# Patient Record
Sex: MALE | Race: BLACK OR AFRICAN AMERICAN | NOT HISPANIC OR LATINO | Employment: FULL TIME | URBAN - METROPOLITAN AREA
[De-identification: names, ages, dates, MRNs, and addresses within clinical notes are randomized per-mention and may not be internally consistent; named-entity substitution may affect disease eponyms.]

---

## 2017-02-04 ENCOUNTER — TRANSFERRED RECORDS (OUTPATIENT)
Dept: HEALTH INFORMATION MANAGEMENT | Facility: CLINIC | Age: 52
End: 2017-02-04

## 2017-02-23 DIAGNOSIS — E78.5 HYPERLIPIDEMIA LDL GOAL <100: ICD-10-CM

## 2017-02-23 RX ORDER — PRAVASTATIN SODIUM 80 MG/1
TABLET ORAL
Qty: 90 TABLET | Refills: 3 | Status: CANCELLED | OUTPATIENT
Start: 2017-02-23

## 2017-02-23 NOTE — TELEPHONE ENCOUNTER
pravastatin (PRAVACHOL) 80 MG tablet     Last Written Prescription Date: 02/08/16  Last Fill Quantity: 90, # refills: 4  Last Office Visit with G, P or [facility] prescribing provider: 10/03/16       Lab Results   Component Value Date    CHOL 156 02/08/2016     Lab Results   Component Value Date    HDL 49 02/08/2016     Lab Results   Component Value Date    LDL 88 02/08/2016     08/06/2013     Lab Results   Component Value Date    TRIG 93 02/08/2016     Lab Results   Component Value Date    CHOLHDLRATIO 2.7 02/17/2015         [facility]

## 2017-02-27 ENCOUNTER — MYC MEDICAL ADVICE (OUTPATIENT)
Dept: FAMILY MEDICINE | Facility: CLINIC | Age: 52
End: 2017-02-27

## 2017-02-27 DIAGNOSIS — E78.5 HYPERLIPIDEMIA LDL GOAL <100: ICD-10-CM

## 2017-02-27 DIAGNOSIS — E11.9 TYPE 2 DIABETES MELLITUS WITHOUT COMPLICATION, WITHOUT LONG-TERM CURRENT USE OF INSULIN (H): Primary | ICD-10-CM

## 2017-02-27 RX ORDER — PRAVASTATIN SODIUM 80 MG/1
80 TABLET ORAL EVERY EVENING
Qty: 30 TABLET | Refills: 0 | Status: SHIPPED | OUTPATIENT
Start: 2017-02-27 | End: 2017-04-03

## 2017-02-27 NOTE — TELEPHONE ENCOUNTER
Pravastatin     Last Written Prescription Date: 02/08/16  Last Fill Quantity: 90, # refills: 4  [place] [facility] [place] [facility] or [facility] prescribing provider: 10/03/16       Lab Results   Component Value Date    CHOL 156 02/08/2016     Lab Results   Component Value Date    HDL 49 02/08/2016     Lab Results   Component Value Date    LDL 88 02/08/2016     08/06/2013     Lab Results   Component Value Date    TRIG 93 02/08/2016     Lab Results   Component Value Date    CHOLHDLRATIO 2.7 02/17/2015     Medication is being filled for 1 time refill only due to:  Patient needs labs FLP.    Samara Martin RN

## 2017-03-23 DIAGNOSIS — E78.5 HYPERLIPIDEMIA LDL GOAL <100: ICD-10-CM

## 2017-03-23 DIAGNOSIS — E11.9 TYPE 2 DIABETES MELLITUS WITHOUT COMPLICATION, WITHOUT LONG-TERM CURRENT USE OF INSULIN (H): ICD-10-CM

## 2017-03-23 LAB
ANION GAP SERPL CALCULATED.3IONS-SCNC: 6 MMOL/L (ref 3–14)
BUN SERPL-MCNC: 9 MG/DL (ref 7–30)
CALCIUM SERPL-MCNC: 9 MG/DL (ref 8.5–10.1)
CHLORIDE SERPL-SCNC: 104 MMOL/L (ref 94–109)
CHOLEST SERPL-MCNC: 185 MG/DL
CO2 SERPL-SCNC: 30 MMOL/L (ref 20–32)
CREAT SERPL-MCNC: 0.9 MG/DL (ref 0.66–1.25)
CREAT UR-MCNC: 233 MG/DL
GFR SERPL CREATININE-BSD FRML MDRD: 88 ML/MIN/1.7M2
GLUCOSE SERPL-MCNC: 122 MG/DL (ref 70–99)
HBA1C MFR BLD: 6 % (ref 4.3–6)
HDLC SERPL-MCNC: 51 MG/DL
LDLC SERPL CALC-MCNC: 113 MG/DL
MICROALBUMIN UR-MCNC: 8 MG/L
MICROALBUMIN/CREAT UR: 3.64 MG/G CR (ref 0–17)
NONHDLC SERPL-MCNC: 134 MG/DL
POTASSIUM SERPL-SCNC: 3.8 MMOL/L (ref 3.4–5.3)
SODIUM SERPL-SCNC: 140 MMOL/L (ref 133–144)
TRIGL SERPL-MCNC: 103 MG/DL

## 2017-03-23 PROCEDURE — 36415 COLL VENOUS BLD VENIPUNCTURE: CPT | Performed by: FAMILY MEDICINE

## 2017-03-23 PROCEDURE — 80048 BASIC METABOLIC PNL TOTAL CA: CPT | Performed by: FAMILY MEDICINE

## 2017-03-23 PROCEDURE — 82043 UR ALBUMIN QUANTITATIVE: CPT | Performed by: FAMILY MEDICINE

## 2017-03-23 PROCEDURE — 80061 LIPID PANEL: CPT | Performed by: FAMILY MEDICINE

## 2017-03-23 PROCEDURE — 83036 HEMOGLOBIN GLYCOSYLATED A1C: CPT | Performed by: FAMILY MEDICINE

## 2017-03-23 NOTE — PROGRESS NOTES
Noted. Has appointment with Dr. Norman on 4/3/17 for diabetes follow up at which time labs will be discussed.    Catalina Durant MD MPH  Covering for Dr. Norman

## 2017-03-27 NOTE — PROGRESS NOTES
Mr. Marte,    All of your labs were normal for you.    Please contact the clinic if you have additional questions.  Thank you.    Sincerely,    Mona Scales

## 2017-04-03 ENCOUNTER — OFFICE VISIT (OUTPATIENT)
Dept: FAMILY MEDICINE | Facility: CLINIC | Age: 52
End: 2017-04-03
Payer: COMMERCIAL

## 2017-04-03 VITALS
OXYGEN SATURATION: 98 % | TEMPERATURE: 97.4 F | DIASTOLIC BLOOD PRESSURE: 82 MMHG | HEART RATE: 71 BPM | SYSTOLIC BLOOD PRESSURE: 138 MMHG | HEIGHT: 73 IN | WEIGHT: 315 LBS | BODY MASS INDEX: 41.75 KG/M2

## 2017-04-03 DIAGNOSIS — I10 HYPERTENSION GOAL BP (BLOOD PRESSURE) < 140/90: ICD-10-CM

## 2017-04-03 DIAGNOSIS — E78.5 HYPERLIPIDEMIA LDL GOAL <100: ICD-10-CM

## 2017-04-03 DIAGNOSIS — E66.01 MORBID OBESITY DUE TO EXCESS CALORIES (H): ICD-10-CM

## 2017-04-03 DIAGNOSIS — E11.9 TYPE 2 DIABETES MELLITUS WITHOUT COMPLICATION, WITHOUT LONG-TERM CURRENT USE OF INSULIN (H): Primary | ICD-10-CM

## 2017-04-03 DIAGNOSIS — J45.20 INTERMITTENT ASTHMA, UNCOMPLICATED: ICD-10-CM

## 2017-04-03 PROCEDURE — 99214 OFFICE O/P EST MOD 30 MIN: CPT | Performed by: FAMILY MEDICINE

## 2017-04-03 PROCEDURE — 99207 C FOOT EXAM  NO CHARGE: CPT | Performed by: FAMILY MEDICINE

## 2017-04-03 RX ORDER — PRAVASTATIN SODIUM 80 MG/1
80 TABLET ORAL EVERY EVENING
Qty: 90 TABLET | Refills: 3 | Status: SHIPPED | OUTPATIENT
Start: 2017-04-03 | End: 2017-12-18

## 2017-04-03 RX ORDER — AMLODIPINE BESYLATE 10 MG/1
10 TABLET ORAL DAILY
Qty: 90 TABLET | Refills: 1 | Status: SHIPPED | OUTPATIENT
Start: 2017-04-03 | End: 2017-09-25

## 2017-04-03 RX ORDER — ALBUTEROL SULFATE 90 UG/1
2 AEROSOL, METERED RESPIRATORY (INHALATION) EVERY 4 HOURS PRN
Qty: 1 INHALER | Refills: 2 | Status: SHIPPED | OUTPATIENT
Start: 2017-04-03 | End: 2018-03-07

## 2017-04-03 NOTE — MR AVS SNAPSHOT
After Visit Summary   4/3/2017    Geovany Marte    MRN: [id number]           Patient Information     Date Of Birth          1965        Visit Information        Provider Department      4/3/2017 5:00 PM Mona Lin MD [facility]        Today's Diagnoses     Type 2 diabetes mellitus without complication, without long-term current use of insulin (H)    -  1    Hyperlipidemia LDL goal <100        Morbid obesity due to excess calories (H)        Hypertension goal BP (blood pressure) < 140/90        Intermittent asthma, uncomplicated          Care Instructions    Based on your medical history and these are the current health maintenance or preventive care services that you are due for (some may have been done at this visit)  Health Maintenance Due   Topic Date Due     ASTHMA CONTROL TEST Q6 MOS (NO INBASKET)  03/14/2016     FOOT EXAM Q1 YEAR( NO INBASKET)  09/14/2016     ASTHMA ACTION PLAN Q1 YR (NO INBASKET)  02/08/2017         At [facility], we strive to deliver an exceptional experience to you, every time we see you.    If you receive a survey in the mail, please send us back your thoughts. We really do value your feedback.    Your care team's suggested websites for health information:  Www.[employer]av[employer]oom.org : Up to date and easily searchable information on multiple topics.  Www.medlineplus.gov : medication info, interactive tutorials, watch real surgeries online  Www.familydoctor.org : good info from the Academy of Family Physicians  Www.cdc.gov : public health info, travel advisories, epidemics (H1N1)  Www.aap.org : children's health info, normal development, vaccinations  Www.health.[facility].[place].us : [place] dept of health, public health issues in [place], N1N1    How to contact your care team:   Team [employer] [phone]         Pharmacy [phone]    Dr. Norman, Loli Solorzano PA-C, Mouna Martin APRN CNP, Cyndi Curry,  GABBIE, Dr. Elias, and GEOVANNI Lomax CNP    Team RNs: Perla & Beatriz      Clinic hours  M-Th 7 am-7 pm   Fri 7 am-5 pm.   Urgent care M-F 11 am-9 pm,   Sat/Sun 9 am-5 pm.  Pharmacy M-Th 8 am-8 pm Fri 8 am-6 pm  Sat/Sun 9 am-5 pm.     All password changes, disabled accounts, or ID changes in [employer]/MyHealth will be done by our Access Services Department.    If you need help with your account or password, call: [phone]. Clinic staff no longer has the ability to change passwords.           Follow-ups after your visit        Who to contact     If you have questions or need follow up information about today's clinic visit or your schedule please contact [facility] directly at [phone].  Normal or non-critical lab and imaging results will be communicated to you by [employer], letter or phone within 4 business days after the clinic has received the results. If you do not hear from us within 7 days, please contact the clinic through [employer] or phone. If you have a critical or abnormal lab result, we will notify you by phone as soon as possible.  Submit refill requests through [employer] or call your pharmacy and they will forward the refill request to us. Please allow 3 business days for your refill to be completed.          Additional Information About Your Visit        GreenPal Information     [employer] gives you secure access to your electronic health record. If you see a primary care provider, you can also send messages to your care team and make appointments. If you have questions, please call your primary care clinic.  If you do not have a primary care provider, please call [phone] and they will assist you.        Care EveryWhere ID     This is your Care EveryWhere ID. This could be used by other organizations to access your [facility] medical records  [id number]        Your Vitals Were     Pulse Temperature Height Pulse Oximetry BMI (Body Mass Index)       71 97.4  F (36.3  C) (Oral)  "6' 1\" (1.854 m) 98% 43.93 kg/m2        Blood Pressure from Last 3 Encounters:   04/03/17 138/82   10/03/16 136/84   02/08/16 136/82    Weight from Last 3 Encounters:   04/03/17 (!) 333 lb (151 kg)   10/03/16 (!) 317 lb 3.2 oz (143.9 kg)   02/08/16 (!) 307 lb 9.6 oz (139.5 kg)              We Performed the Following     Asthma Action Plan (AAP)     FOOT EXAM  NO CHARGE [76939.114]          Today's Medication Changes          These changes are accurate as of: 4/3/17  5:32 PM.  If you have any questions, ask your nurse or doctor.               These medicines have changed or have updated prescriptions.        Dose/Directions    amLODIPine 10 MG tablet   Commonly known as:  NORVASC   This may have changed:  See the new instructions.   Used for:  Hypertension goal BP (blood pressure) < 140/90   Changed by:  Mona Lin MD        Dose:  10 mg   Take 1 tablet (10 mg) by mouth daily   Quantity:  90 tablet   Refills:  1            Where to get your medicines      These medications were sent to [facility]/pharmacy #[id number] - [place], [place] - [address]  [address] [address]     Phone:  [phone]     albuterol 108 (90 BASE) MCG/ACT Inhaler    amLODIPine 10 MG tablet    pravastatin 80 MG tablet                Primary Care Provider Office Phone # Fax #    Mona Scales [phone]       [facility] [address]  Health system [address]        Thank you!     Thank you for choosing [facility]  for your care. Our goal is always to provide you with excellent care. Hearing back from our patients is one way we can continue to improve our services. Please take a few minutes to complete the written survey that you may receive in the mail after your visit with us. Thank you!             Your Updated Medication List - Protect others around you: Learn how to safely use, store and throw away your medicines at " www.disposemymeds.org.          This list is accurate as of: 4/3/17  5:32 PM.  Always use your most recent med list.                   Brand Name Dispense Instructions for use    ACE/ARB NOT PRESCRIBED (INTENTIONAL)      continuous prn for other Reported on 4/3/2017       albuterol 108 (90 BASE) MCG/ACT Inhaler    albuterol    1 Inhaler    Inhale 2 puffs into the lungs every 4 hours as needed (for asthma symptoms)       amLODIPine 10 MG tablet    NORVASC    90 tablet    Take 1 tablet (10 mg) by mouth daily       aspirin 81 MG tablet      1 TABLET DAILY (*)       blood glucose monitoring test strip    ONE TOUCH ULTRA    1 Box    by In Vitro route 2 times daily.       carvedilol 25 MG tablet    COREG    180 tablet    TAKE 1 TABLET BY MOUTH 2 TIMES DAILY (WITH MEALS)       CITRACAL CALCIUM+D PO      3 times daily. Take one tablet twice daily.       * cyanocobalamin 1000 MCG/ML injection    VITAMIN B12    1 mL    Inject 1 mL (1,000 mcg) Subcutaneous every 30 days       * cyanocobalamin 1000 MCG/ML injection    VITAMIN B12    1 mL    Inject 1 mL (1,000 mcg) Subcutaneous every 30 days       fluticasone 50 MCG/ACT spray    FLONASE    1 Package    Spray 1-2 sprays into both nostrils daily       order for DME     1 kit    One touch glucometer with supplies to replace old meter for testing twice daily       order for DME     12 each    Injection Supplies for Vitamin B12: 3cc syringes w/ 27 gauge needles, 1/2 inch length       pravastatin 80 MG tablet    PRAVACHOL    90 tablet    Take 1 tablet (80 mg) by mouth every evening       * Notice:  This list has 2 medication(s) that are the same as other medications prescribed for you. Read the directions carefully, and ask your doctor or other care provider to review them with you.

## 2017-04-03 NOTE — NURSING NOTE
"Chief Complaint   Patient presents with     Diabetes     Follow up.     Hypertension     Follow up.     Hyperlipidemia     Follow up.       Initial /82 (BP Location: Right arm, Patient Position: Chair, Cuff Size: Adult Large)  Pulse 71  Temp 97.4  F (36.3  C) (Oral)  Ht 6' 1\" (1.854 m)  Wt (!) 333 lb (151 kg)  SpO2 98%  BMI 43.93 kg/m2 Estimated body mass index is 43.93 kg/(m^2) as calculated from the following:    Height as of this encounter: 6' 1\" (1.854 m).    Weight as of this encounter: 333 lb (151 kg).  Medication Reconciliation: complete   An,CMA      "

## 2017-04-03 NOTE — PATIENT INSTRUCTIONS
Based on your medical history and these are the current health maintenance or preventive care services that you are due for (some may have been done at this visit)  Health Maintenance Due   Topic Date Due     ASTHMA CONTROL TEST Q6 MOS (NO INBASKET)  03/14/2016     FOOT EXAM Q1 YEAR( NO INBASKET)  09/14/2016     ASTHMA ACTION PLAN Q1 YR (NO INBASKET)  02/08/2017         At [facility], we strive to deliver an exceptional experience to you, every time we see you.    If you receive a survey in the mail, please send us back your thoughts. We really do value your feedback.    Your care team's suggested websites for health information:  Www.[facility].org : Up to date and easily searchable information on multiple topics.  Www.medlineplus.gov : medication info, interactive tutorials, watch real surgeries online  Www.familydoctor.org : good info from the Academy of Family Physicians  Www.cdc.gov : public health info, travel advisories, epidemics (H1N1)  Www.aap.org : children's health info, normal development, vaccinations  Www.health.[facility].[place].us : [place] dept of health, public health issues in [place], N1N1    How to contact your care team:   Team Ofe[employer]irit [phone]         Pharmacy [phone]    Dr. Norman, Loli Solorzano PA-C, Dr. Durant, Mouna GALARZA CNP, Cyndi Curry PA-C, Dr. Elias, and GEOVANNI Lomax CNP    Team RNs: Perla Henderson      Clinic hours  M-Th 7 am-7 pm   Fri 7 am-5 pm.   Urgent care M-F 11 am-9 pm,   Sat/Sun 9 am-5 pm.  Pharmacy M-Th 8 am-8 pm Fri 8 am-6 pm  Sat/Sun 9 am-5 pm.     All password changes, disabled accounts, or ID changes in [employer]/MyHealth will be done by our Access Services Department.    If you need help with your account or password, call: [phone]. Clinic staff no longer has the ability to change passwords.

## 2017-04-03 NOTE — PROGRESS NOTES
SUBJECTIVE:                                                    Geovany Marte is a 51 year old male who presents to clinic today for the following health issues:        Diabetes/obesity Follow-up    Patient is checking blood sugars: once daily.  Results are as follows:         am - 90 to 115    Diabetic concerns: None     Symptoms of hypoglycemia (low blood sugar): none     Paresthesias (numbness or burning in feet) or sores: No     Date of last diabetic eye exam: duo     Hyperlipidemia Follow-Up      Rate your low fat/cholesterol diet?: fair    Taking statin?  Yes, no muscle aches from statin    Other lipid medications/supplements?:  none     Hypertension Follow-up      Outpatient blood pressures are being checked at home.  Results are 140/90.    Low Salt Diet: low salt         Amount of exercise or physical activity: None    Problems taking medications regularly: No    Medication side effects: none    Diet: regular (no restrictions)    Currently on severance from [employer] and may return until July.  Looking into getting into pharma sells    Asthma Follow-Up    Was ACT completed today?  Yes 25 today  Yes    ACT Total Scores 9/14/2015   ACT TOTAL SCORE -   ASTHMA ER VISITS -   ASTHMA HOSPITALIZATIONS -   ACT TOTAL SCORE (Goal Greater than or Equal to 20) 25   In the past 12 months, how many times did you visit the emergency room for your asthma without being admitted to the hospital? 0   In the past 12 months, how many times were you hospitalized overnight because of your asthma? 0       Recent asthma triggers that patient is dealing with: None        Problem list and histories reviewed & adjusted, as indicated.  Additional history: as documented    Patient Active Problem List   Diagnosis     Hyperlipidemia LDL goal <100     Type 2 diabetes, HbA1c goal < 7% (H)     Disturbance of skin sensation     Intermittent asthma     Hypertension goal BP (blood pressure) < 140/90     Dry eye syndrome     Nuclear sclerosis      "Erectile dysfunction     Glaucoma suspect     Gout     Seasonal allergic rhinitis     Morbid obesity (H)     Past Surgical History:   Procedure Laterality Date     ENT SURGERY      tonsils removed at 19 years old     ESOPHAGOSCOPY, GASTROSCOPY, DUODENOSCOPY (EGD), COMBINED N/A 3/18/2015    Procedure: COMBINED ESOPHAGOSCOPY, GASTROSCOPY, DUODENOSCOPY (EGD);  Surgeon: Jae Robles MD;  Location:  GI     LAPAROSCOPIC BYPASS GASTRIC  6/22/2011    Procedure:LAPAROSCOPIC BYPASS GASTRIC; Surgeon:HANNAH SHEPARD; Location: OR       Social History   Substance Use Topics     Smoking status: Never Smoker     Smokeless tobacco: Never Used      Comment: 1994     Alcohol use 0.0 oz/week     0 Standard drinks or equivalent per week      Comment: Once a week.     Family History   Problem Relation Age of Onset     CEREBROVASCULAR DISEASE Mother      Hypertension Mother      DIABETES Father      Hypertension Father      HEART DISEASE Brother      Breast Cancer Sister      CANCER Sister      CEREBROVASCULAR DISEASE Brother      Breast Cancer Sister      Asthma Son      Thyroid Disease No family hx of      Glaucoma No family hx of      Macular Degeneration No family hx of            Reviewed and updated as needed this visit by clinical staff       Reviewed and updated as needed this visit by Provider         ROS:  Constitutional, HEENT, cardiovascular, pulmonary, gi and gu systems are negative, except as otherwise noted.    OBJECTIVE:                                                    /82 (BP Location: Right arm, Patient Position: Chair, Cuff Size: Adult Large)  Pulse 71  Temp 97.4  F (36.3  C) (Oral)  Ht 6' 1\" (1.854 m)  Wt (!) 333 lb (151 kg)  SpO2 98%  BMI 43.93 kg/m2  Body mass index is 43.93 kg/(m^2).  GENERAL: healthy, alert and no distress  NECK: no adenopathy, no asymmetry, masses, or scars and thyroid normal to palpation  RESP: lungs clear to auscultation - no rales, rhonchi or wheezes  CV: regular " rate and rhythm, normal S1 S2, no S3 or S4, no murmur, click or rub, no peripheral edema and peripheral pulses strong  ABDOMEN: soft, nontender, no hepatosplenomegaly, no masses and bowel sounds normal  MS: no gross musculoskeletal defects noted, no edema  PSYCH: mentation appears normal, affect normal/bright  Diabetic foot exam: normal DP and PT pulses, no trophic changes or ulcerative lesions, normal sensory exam and normal monofilament exam    Diagnostic Test Results:  Results for orders placed or performed in visit on 03/23/17   Basic metabolic panel   Result Value Ref Range    Sodium 140 133 - 144 mmol/L    Potassium 3.8 3.4 - 5.3 mmol/L    Chloride 104 94 - 109 mmol/L    Carbon Dioxide 30 20 - 32 mmol/L    Anion Gap 6 3 - 14 mmol/L    Glucose 122 (H) 70 - 99 mg/dL    Urea Nitrogen 9 7 - 30 mg/dL    Creatinine 0.90 0.66 - 1.25 mg/dL    GFR Estimate 88 >60 mL/min/1.7m2    GFR Estimate If Black >90   GFR Calc   >60 mL/min/1.7m2    Calcium 9.0 8.5 - 10.1 mg/dL   Hemoglobin A1c   Result Value Ref Range    Hemoglobin A1C 6.0 4.3 - 6.0 %   Lipid panel reflex to direct LDL   Result Value Ref Range    Cholesterol 185 <200 mg/dL    Triglycerides 103 <150 mg/dL    HDL Cholesterol 51 >39 mg/dL    LDL Cholesterol Calculated 113 (H) <100 mg/dL    Non HDL Cholesterol 134 (H) <130 mg/dL   Albumin Random Urine Quantitative   Result Value Ref Range    Creatinine Urine 233 mg/dL    Albumin Urine mg/L 8 mg/L    Albumin Urine mg/g Cr 3.64 0 - 17 mg/g Cr        ASSESSMENT/PLAN:                                                    1. Type 2 diabetes mellitus without complication, without long-term current use of insulin (H)  Controlled - continue current treatment, increase exercise and decrease carbs  - FOOT EXAM  NO CHARGE [60997.114]    2. Hyperlipidemia LDL goal <100  Refilled  - pravastatin (PRAVACHOL) 80 MG tablet; Take 1 tablet (80 mg) by mouth every evening  Dispense: 90 tablet; Refill: 3    3. Hypertension  goal BP (blood pressure) < 140/90  Controlled - Refilled  - amLODIPine (NORVASC) 10 MG tablet; Take 1 tablet (10 mg) by mouth daily  Dispense: 90 tablet; Refill: 1    4. Intermittent asthma, uncomplicated  Stable - Refilled.  - albuterol (ALBUTEROL) 108 (90 BASE) MCG/ACT Inhaler; Inhale 2 puffs into the lungs every 4 hours as needed (for asthma symptoms)  Dispense: 1 Inhaler; Refill: 2  - Asthma Action Plan (AAP)    Morbid obesity  Low carb eating and exercise recommended.    The uses and side effects, including black box warnings as appropriate, were discussed in detail.  All patient questions were answered.  The patient was instructed to call immediately if any side effects developed.     FUTURE APPOINTMENTS:       - Follow-up visit in 6 months.    Mona Scales MD  [facility]

## 2017-04-04 ASSESSMENT — ASTHMA QUESTIONNAIRES: ACT_TOTALSCORE: 25

## 2017-05-19 ENCOUNTER — MYC MEDICAL ADVICE (OUTPATIENT)
Dept: FAMILY MEDICINE | Facility: CLINIC | Age: 52
End: 2017-05-19

## 2017-05-19 DIAGNOSIS — E78.5 HYPERLIPIDEMIA LDL GOAL <100: ICD-10-CM

## 2017-05-19 DIAGNOSIS — I10 HYPERTENSION GOAL BP (BLOOD PRESSURE) < 140/90: ICD-10-CM

## 2017-05-19 NOTE — TELEPHONE ENCOUNTER
OTHER TWO MEDS SHOULD HAVE REFILLS AVAILABLE.      carvedilol (COREG) 25 MG tablet      Last Written Prescription Date: 12/06/16  Last Fill Quantity: 180, # refills: 1    Last Office Visit with [facility], P or [facility] prescribing provider:  04/03/17   Future Office Visit:        BP Readings from Last 3 Encounters:   04/03/17 138/82   10/03/16 136/84   02/08/16 136/82

## 2017-05-21 DIAGNOSIS — I10 HYPERTENSION GOAL BP (BLOOD PRESSURE) < 140/90: ICD-10-CM

## 2017-05-21 NOTE — TELEPHONE ENCOUNTER
carvedilol (COREG) 25 MG tablet      Last Written Prescription Date: 12/6/16  Last Fill Quantity: 180, # refills: 1    Last Office Visit with G, P or [facility] prescribing provider:  4/3/17   Future Office Visit:        BP Readings from Last 3 Encounters:   04/03/17 138/82   10/03/16 136/84   02/08/16 136/82       [facility]

## 2017-05-23 RX ORDER — AMLODIPINE BESYLATE 10 MG/1
10 TABLET ORAL DAILY
Qty: 90 TABLET | Refills: 1 | OUTPATIENT
Start: 2017-05-23

## 2017-05-23 RX ORDER — PRAVASTATIN SODIUM 80 MG/1
80 TABLET ORAL EVERY EVENING
Qty: 90 TABLET | Refills: 3 | OUTPATIENT
Start: 2017-05-23

## 2017-05-23 RX ORDER — CARVEDILOL 25 MG/1
TABLET ORAL
Qty: 180 TABLET | Refills: 2 | Status: SHIPPED | OUTPATIENT
Start: 2017-05-23 | End: 2018-02-11

## 2017-05-23 RX ORDER — CARVEDILOL 25 MG/1
TABLET ORAL
Qty: 180 TABLET | Refills: 2 | Status: SHIPPED | OUTPATIENT
Start: 2017-05-23 | End: 2017-05-23

## 2017-05-23 NOTE — TELEPHONE ENCOUNTER
Prescription approved per [facility]. - Coreg although I cannot get a successful transmission to pharmacy, I've tried twice.  Denied amlodipine and pravastatin as there should be refills available for patient at pharmacy.     I left detailed voice mail message for pharmacist verbally ordering coreg because I couldn't get it to go through eprescribe.     Zeinab Lockwood RN

## 2017-05-24 ENCOUNTER — TELEPHONE (OUTPATIENT)
Dept: FAMILY MEDICINE | Facility: CLINIC | Age: 52
End: 2017-05-24

## 2017-05-24 RX ORDER — CARVEDILOL 25 MG/1
TABLET ORAL
Qty: 180 TABLET | Refills: 1 | OUTPATIENT
Start: 2017-05-24

## 2017-05-24 NOTE — TELEPHONE ENCOUNTER
Pharmacy never received the verbal Rx left on their line. Called in verbal directly to the tech.    Beatriz Frias RN, [facility]

## 2017-05-24 NOTE — TELEPHONE ENCOUNTER
Reason for Call:  Medication or medication refill:    Do you use a [facility]?  Name of the pharmacy and phone number for the current request:     [facility] [phone]         Name of the medication requested: carvedilol (COREG) 25 MG tablet    Other request: Patient had lost his luggage that his medication was in,  and now does not have any pills left for the above medication    Can we leave a detailed message on this number? YES    Phone number patient can be reached at:     Best Time: as soon as possible    Sent High priority due to no medication due to loss     Call taken on 5/24/2017 at 2:33 PM by Radha Krause

## 2017-09-25 ENCOUNTER — OFFICE VISIT (OUTPATIENT)
Dept: FAMILY MEDICINE | Facility: CLINIC | Age: 52
End: 2017-09-25
Payer: COMMERCIAL

## 2017-09-25 VITALS
SYSTOLIC BLOOD PRESSURE: 134 MMHG | DIASTOLIC BLOOD PRESSURE: 70 MMHG | WEIGHT: 315 LBS | BODY MASS INDEX: 41.75 KG/M2 | HEART RATE: 62 BPM | OXYGEN SATURATION: 97 % | TEMPERATURE: 98.1 F | HEIGHT: 73 IN

## 2017-09-25 DIAGNOSIS — E66.01 MORBID OBESITY DUE TO EXCESS CALORIES (H): ICD-10-CM

## 2017-09-25 DIAGNOSIS — M62.838 MUSCLE SPASM: ICD-10-CM

## 2017-09-25 DIAGNOSIS — E11.9 TYPE 2 DIABETES MELLITUS WITHOUT COMPLICATION, WITHOUT LONG-TERM CURRENT USE OF INSULIN (H): Primary | ICD-10-CM

## 2017-09-25 DIAGNOSIS — I10 HYPERTENSION GOAL BP (BLOOD PRESSURE) < 140/90: ICD-10-CM

## 2017-09-25 DIAGNOSIS — Z23 NEED FOR PROPHYLACTIC VACCINATION AND INOCULATION AGAINST INFLUENZA: ICD-10-CM

## 2017-09-25 LAB — HBA1C MFR BLD: 6.2 % (ref 4.3–6)

## 2017-09-25 PROCEDURE — 80048 BASIC METABOLIC PNL TOTAL CA: CPT | Performed by: FAMILY MEDICINE

## 2017-09-25 PROCEDURE — 99214 OFFICE O/P EST MOD 30 MIN: CPT | Mod: 25 | Performed by: FAMILY MEDICINE

## 2017-09-25 PROCEDURE — 90686 IIV4 VACC NO PRSV 0.5 ML IM: CPT | Performed by: FAMILY MEDICINE

## 2017-09-25 PROCEDURE — 90471 IMMUNIZATION ADMIN: CPT | Performed by: FAMILY MEDICINE

## 2017-09-25 PROCEDURE — 36415 COLL VENOUS BLD VENIPUNCTURE: CPT | Performed by: FAMILY MEDICINE

## 2017-09-25 PROCEDURE — 83036 HEMOGLOBIN GLYCOSYLATED A1C: CPT | Performed by: FAMILY MEDICINE

## 2017-09-25 RX ORDER — AMLODIPINE BESYLATE 10 MG/1
10 TABLET ORAL DAILY
Qty: 90 TABLET | Refills: 1 | Status: SHIPPED | OUTPATIENT
Start: 2017-09-25 | End: 2018-03-07

## 2017-09-25 NOTE — MR AVS SNAPSHOT
After Visit Summary   9/25/2017    Geovany Marte    MRN: [id number]           Patient Information     Date Of Birth          1965        Visit Information        Provider Department      9/25/2017 5:40 PM Mona Lin MD [facility]        Today's Diagnoses     Type 2 diabetes mellitus without complication, without long-term current use of insulin (H)    -  1    Muscle spasm        Hypertension goal BP (blood pressure) < 140/90        Morbid obesity due to excess calories (H)        Need for prophylactic vaccination and inoculation against influenza           Follow-ups after your visit        Your next 10 appointments already scheduled     Sep 26, 2017  4:00 PM CDT   New Visit with Yoseph Ortiz OD   [facility] ([facility])    [address] [place] [id number] [phone]              Who to contact     If you have questions or need follow up information about today's clinic visit or your schedule please contact [facility] directly at [phone].  Normal or non-critical lab and imaging results will be communicated to you by MyChart, letter or phone within 4 business days after the clinic has received the results. If you do not hear from us within 7 days, please contact the clinic through Imaginovahart or phone. If you have a critical or abnormal lab result, we will notify you by phone as soon as possible.  Submit refill requests through Photofy or call your pharmacy and they will forward the refill request to us. Please allow 3 business days for your refill to be completed.          Additional Information About Your Visit        MyChart Information     Photofy gives you secure access to your electronic health record. If you see a primary care provider, you can also send messages to your care team and make appointments. If you have questions, please call your primary care clinic.  If  "you do not have a primary care provider, please call [phone] and they will assist you.        Care EveryWhere ID     This is your Care EveryWhere ID. This could be used by other organizations to access your [facility] medical records  [id number]        Your Vitals Were     Pulse Temperature Height Pulse Oximetry BMI (Body Mass Index)       62 98.1  F (36.7  C) (Oral) 6' 1\" (1.854 m) 97% 44.07 kg/m2        Blood Pressure from Last 3 Encounters:   09/25/17 134/70   04/03/17 138/82   10/03/16 136/84    Weight from Last 3 Encounters:   09/25/17 (!) 334 lb (151.5 kg)   04/03/17 (!) 333 lb (151 kg)   10/03/16 (!) 317 lb 3.2 oz (143.9 kg)              We Performed the Following          ADMIN VACCINE, FIRST [49969]     Basic metabolic panel     HC FLU VAC PRESRV FREE QUAD SPLIT VIR 3+YRS IM  [45146]     HEMOGLOBIN A1C     Vaccine Administration, Initial [10900]          Where to get your medicines      These medications were sent to [facility]/pharmacy #[id number] - [facility], [place] - [address]  [address], [facility] [address]     Phone:  [phone]     amLODIPine 10 MG tablet          Primary Care Provider Office Phone # Fax #    Mona Hodgesstephanie Scales [phone]       [address]  [facility] [address]        Equal Access to Services     [facility] CenterJESSICA AH: Hadii aad ku hadasho Soomaali, waaxda luqadaha, qaybta kaalmada adeegyada, omega pressley. So [facility] [phone].    ATENCIÓN: Si habla español, tiene a cortes disposición servicios gratuitos de asistencia lingüística. Llame al [phone].    We comply with applicable federal civil rights laws and [place] laws. We do not discriminate on the basis of race, color, national origin, age, disability sex, sexual orientation or gender identity.            Thank you!     Thank you for choosing [facility]  for your care. Our goal is always to provide you with excellent care. Hearing " back from our patients is one way we can continue to improve our services. Please take a few minutes to complete the written survey that you may receive in the mail after your visit with us. Thank you!             Your Updated Medication List - Protect others around you: Learn how to safely use, store and throw away your medicines at www.disposemymeds.org.          This list is accurate as of: 9/25/17  7:09 PM.  Always use your most recent med list.                   Brand Name Dispense Instructions for use Diagnosis    ACE/ARB NOT PRESCRIBED (INTENTIONAL)      continuous prn for other Reported on 4/3/2017    Type 2 diabetes mellitus without complication (H)       albuterol 108 (90 BASE) MCG/ACT Inhaler    PROAIR HFA    1 Inhaler    Inhale 2 puffs into the lungs every 4 hours as needed (for asthma symptoms)    Intermittent asthma, uncomplicated       amLODIPine 10 MG tablet    NORVASC    90 tablet    Take 1 tablet (10 mg) by mouth daily    Hypertension goal BP (blood pressure) < 140/90       aspirin 81 MG tablet      1 TABLET DAILY (*)        blood glucose monitoring test strip    ONE TOUCH ULTRA    1 Box    by In Vitro route 2 times daily.    Type 2 diabetes, HbA1c goal < 7% (H)       carvedilol 25 MG tablet    COREG    180 tablet    TAKE 1 TABLET BY MOUTH 2 TIMES DAILY (WITH MEALS)    Hypertension goal BP (blood pressure) < 140/90       CITRACAL CALCIUM+D PO      3 times daily. Take one tablet twice daily.        cyanocobalamin 1000 MCG/ML injection    VITAMIN B12    1 mL    Inject 1 mL (1,000 mcg) Subcutaneous every 30 days    Achlorhydria, gastric       fluticasone 50 MCG/ACT spray    FLONASE    1 Package    Spray 1-2 sprays into both nostrils daily    Seasonal allergic rhinitis       order for DME     1 kit    One touch glucometer with supplies to replace old meter for testing twice daily    Type 2 diabetes, HbA1c goal < 7% (H)       order for DME     12 each    Injection Supplies for Vitamin B12: 3cc syringes  w/ 27 gauge needles, 1/2 inch length    Achlorhydria, gastric       pravastatin 80 MG tablet    PRAVACHOL    90 tablet    Take 1 tablet (80 mg) by mouth every evening    Hyperlipidemia LDL goal <100

## 2017-09-25 NOTE — PROGRESS NOTES
Injectable Influenza Immunization Documentation    1.  Is the person to be vaccinated sick today?   No    2. Does the person to be vaccinated have an allergy to a component   of the vaccine?   No    3. Has the person to be vaccinated ever had a serious reaction   to influenza vaccine in the past?   No    4. Has the person to be vaccinated ever had Guillain-Barré syndrome?   No    Form completed by Anai Crespo MA

## 2017-09-25 NOTE — PROGRESS NOTES
SUBJECTIVE:   Geovany Marte is a 51 year old male who presents to clinic today for the following health issues:      Diabetes Follow-up    Patient is checking blood sugars: twice daily.    Blood sugar testing frequency justification:   Results are as follows:       Lunchtime and Breakfast - 10AM or 4PM    Diabetic concerns: None     Symptoms of hypoglycemia (low blood sugar): none     Paresthesias (numbness or burning in feet) or sores: No   Date of last diabetic eye exam: not yet    Hypertension Follow-up      Outpatient blood pressures are being checked at home.  Results are High.    Low Salt Diet: no    Amount of exercise or physical activity: None    Problems taking medications regularly: No    Medication side effects: none    Diet: regular (no restrictions)      Muscle spasm of right calf after knee injury 1 month ago.  Banged knee when getting up from [employer] and knee has improved but sometimes getting cramping of calf at night.  Not eating many green veggies.    Problem list and histories reviewed & adjusted, as indicated.  Additional history: as documented    Patient Active Problem List   Diagnosis     Hyperlipidemia LDL goal <100     Type 2 diabetes, HbA1c goal < 7% (H)     Disturbance of skin sensation     Intermittent asthma     Hypertension goal BP (blood pressure) < 140/90     Dry eye syndrome     Nuclear sclerosis     Erectile dysfunction     Glaucoma suspect     Gout     Seasonal allergic rhinitis     Morbid obesity (H)     Past Surgical History:   Procedure Laterality Date     ENT SURGERY      tonsils removed at 19 years old     ESOPHAGOSCOPY, GASTROSCOPY, DUODENOSCOPY (EGD), COMBINED N/A 3/18/2015    Procedure: COMBINED ESOPHAGOSCOPY, GASTROSCOPY, DUODENOSCOPY (EGD);  Surgeon: Jae Robles MD;  Location:  GI     LAPAROSCOPIC BYPASS GASTRIC  6/22/2011    Procedure:LAPAROSCOPIC BYPASS GASTRIC; Surgeon:HANNAH SHEPARD; Location: OR       Social History   Substance Use Topics      "Smoking status: Never Smoker     Smokeless tobacco: Never Used      Comment: 1994     Alcohol use 0.0 oz/week     0 Standard drinks or equivalent per week      Comment: Once a week.     Family History   Problem Relation Age of Onset     CEREBROVASCULAR DISEASE Mother      Hypertension Mother      DIABETES Father      Hypertension Father      HEART DISEASE Brother      Breast Cancer Sister      CANCER Sister      CEREBROVASCULAR DISEASE Brother      Breast Cancer Sister      Asthma Son      Thyroid Disease No family hx of      Glaucoma No family hx of      Macular Degeneration No family hx of              Reviewed and updated as needed this visit by clinical staff     Reviewed and updated as needed this visit by Provider         ROS:  Constitutional, HEENT, cardiovascular, pulmonary, gi and gu systems are negative, except as otherwise noted.      OBJECTIVE:   /70  Pulse 62  Temp 98.1  F (36.7  C) (Oral)  Ht 6' 1\" (1.854 m)  Wt (!) 334 lb (151.5 kg)  SpO2 97%  BMI 44.07 kg/m2  Body mass index is 44.07 kg/(m^2).  GENERAL: healthy, alert, no distress and obese  NECK: no adenopathy, no asymmetry, masses, or scars and thyroid normal to palpation  RESP: lungs clear to auscultation - no rales, rhonchi or wheezes  CV: regular rate and rhythm, normal S1 S2, no S3 or S4, no murmur, click or rub, no peripheral edema and peripheral pulses strong  ABDOMEN: soft, nontender, no hepatosplenomegaly, no masses and bowel sounds normal  MS: no gross musculoskeletal defects noted, no edema  NEURO: Normal strength and tone, mentation intact and speech normal  PSYCH: mentation appears normal, affect normal/bright    Diagnostic Test Results:  none     ASSESSMENT/PLAN:     1. Type 2 diabetes mellitus without complication, without long-term current use of insulin (H)  Previously controlled - check lab  - Basic metabolic panel  - HEMOGLOBIN A1C    2. Muscle spasm  Recommend otc magnesium supplement, increase green veggies and " mychart if not improved in 1 week.    4. Hypertension goal BP (blood pressure) < 140/90  Controlled - continue current treatment  - amLODIPine (NORVASC) 10 MG tablet; Take 1 tablet (10 mg) by mouth daily  Dispense: 90 tablet; Refill: 1    5. Morbid obesity due to excess calories (H)  Low carb eating recommended    6. Need for prophylactic vaccination and inoculation against influenza    - HC FLU VAC PRESRV FREE QUAD SPLIT VIR 3+YRS IM  [81238]  -      ADMIN VACCINE, FIRST [06973]    The uses and side effects, including black box warnings as appropriate, were discussed in detail.  All patient questions were answered.  The patient was instructed to call immediately if any side effects developed.     FUTURE APPOINTMENTS:       - Follow-up visit in 6 months    Mona Scales MD  [facility]

## 2017-09-25 NOTE — NURSING NOTE
"Chief Complaint   Patient presents with     Diabetes     Hypertension       Initial /82 (BP Location: Right arm, Patient Position: Chair, Cuff Size: Adult Large)  Pulse 62  Temp 98.1  F (36.7  C) (Oral)  Ht 6' 1\" (1.854 m)  Wt (!) 334 lb (151.5 kg)  SpO2 97%  BMI 44.07 kg/m2 Estimated body mass index is 44.07 kg/(m^2) as calculated from the following:    Height as of this encounter: 6' 1\" (1.854 m).    Weight as of this encounter: 334 lb (151.5 kg).  Medication Reconciliation: complete   Anai Crespo MA        "

## 2017-09-26 ENCOUNTER — OFFICE VISIT (OUTPATIENT)
Dept: OPTOMETRY | Facility: CLINIC | Age: 52
End: 2017-09-26
Payer: COMMERCIAL

## 2017-09-26 DIAGNOSIS — H40.003 GLAUCOMA SUSPECT, BILATERAL: ICD-10-CM

## 2017-09-26 DIAGNOSIS — H52.4 PRESBYOPIA: ICD-10-CM

## 2017-09-26 DIAGNOSIS — E11.9 TYPE 2 DIABETES MELLITUS WITHOUT COMPLICATION, WITHOUT LONG-TERM CURRENT USE OF INSULIN (H): Primary | ICD-10-CM

## 2017-09-26 DIAGNOSIS — H52.03 HYPEROPIA, BILATERAL: ICD-10-CM

## 2017-09-26 LAB
ANION GAP SERPL CALCULATED.3IONS-SCNC: 5 MMOL/L (ref 3–14)
BUN SERPL-MCNC: 9 MG/DL (ref 7–30)
CALCIUM SERPL-MCNC: 8.8 MG/DL (ref 8.5–10.1)
CHLORIDE SERPL-SCNC: 103 MMOL/L (ref 94–109)
CO2 SERPL-SCNC: 28 MMOL/L (ref 20–32)
CREAT SERPL-MCNC: 0.79 MG/DL (ref 0.66–1.25)
GFR SERPL CREATININE-BSD FRML MDRD: >90 ML/MIN/1.7M2
GLUCOSE SERPL-MCNC: 94 MG/DL (ref 70–99)
POTASSIUM SERPL-SCNC: 4 MMOL/L (ref 3.4–5.3)
SODIUM SERPL-SCNC: 136 MMOL/L (ref 133–144)

## 2017-09-26 PROCEDURE — 92015 DETERMINE REFRACTIVE STATE: CPT | Performed by: OPTOMETRIST

## 2017-09-26 PROCEDURE — 92014 COMPRE OPH EXAM EST PT 1/>: CPT | Performed by: OPTOMETRIST

## 2017-09-26 ASSESSMENT — EXTERNAL EXAM - LEFT EYE: OS_EXAM: NORMAL

## 2017-09-26 ASSESSMENT — TONOMETRY
OD_IOP_MMHG: 14
IOP_METHOD: TONOPEN
OS_IOP_MMHG: 13

## 2017-09-26 ASSESSMENT — EXTERNAL EXAM - RIGHT EYE: OD_EXAM: NORMAL

## 2017-09-26 ASSESSMENT — REFRACTION_WEARINGRX
OS_AXIS: 050
OS_SPHERE: +0.75
OD_ADD: +2.00
OS_ADD: +2.00
OD_SPHERE: +1.25
OD_CYLINDER: SPHERE
OS_CYLINDER: +0.25

## 2017-09-26 ASSESSMENT — VISUAL ACUITY
OS_SC+: -1
OS_SC: 20/30
METHOD: SNELLEN - LINEAR
OD_SC+: -1
OD_SC: 20/40
OS_SC: 20/80
OD_SC: 20/80

## 2017-09-26 ASSESSMENT — REFRACTION_MANIFEST
OS_ADD: +2.25
OD_SPHERE: +1.50
OD_ADD: +2.25
OS_SPHERE: +0.75
OS_CYLINDER: +0.25
OS_AXIS: 050
OD_CYLINDER: SPHERE

## 2017-09-26 ASSESSMENT — CONF VISUAL FIELD
OD_NORMAL: 1
OS_NORMAL: 1

## 2017-09-26 ASSESSMENT — SLIT LAMP EXAM - LIDS
COMMENTS: NORMAL
COMMENTS: NORMAL

## 2017-09-26 NOTE — PROGRESS NOTES
Chief Complaint   Patient presents with     Diabetic Eye Exam     Accompanied by wife  Lab Results   Component Value Date    A1C 6.2 09/25/2017    A1C 6.0 03/23/2017    A1C 5.7 10/03/2016    A1C 5.7 02/08/2016    A1C 5.6 09/14/2015       Last Eye Exam: 9-  Dilated Previously: Yes    What are you currently using to see? otc readers    Distance Vision Acuity: Satisfied with vision    Near Vision Acuity: Satisfied with vision while reading  With otc readers    Eye Comfort: dry  Do you use eye drops? : Yes: systane  Occupation or Hobbies:  [employer]    Birgit Alicia Optometric Assistant, A.B.O.C.     Medical, surgical and family histories reviewed and updated 9/26/2017.       OBJECTIVE: See Ophthalmology exam    ASSESSMENT:    ICD-10-CM    1. Type 2 diabetes mellitus without complication, without long-term current use of insulin (H) E11.9 EYE EXAM (SIMPLE-NONBILLABLE)   2. No diabetic retinopathy in both eyes Z03.89 EYE EXAM (SIMPLE-NONBILLABLE)   3. Glaucoma suspect, bilateral H40.003 EYE EXAM (SIMPLE-NONBILLABLE)     OPHTHALMOLOGY ADULT REFERRAL   4. Hyperopia, bilateral H52.03 REFRACTION   5. Presbyopia H52.4 REFRACTION      PLAN:    Geovany Marte aware  eye exam results will be sent to Mona Lin.  Patient Instructions   There are not any signs of the diabetes affecting the eyes today.  It is important that you get your eyes dilated once yearly and keep good control of your diabetes.    Referral to [facility] ophthalmology for follow up OCT/VF- last testing was in 2014.    Eyeglass prescription given.    Return in 1 year for a complete eye exam or sooner if needed.    Yoseph Ortiz, SHIRA

## 2017-09-26 NOTE — PATIENT INSTRUCTIONS
There are not any signs of the diabetes affecting the eyes today.  It is important that you get your eyes dilated once yearly and keep good control of your diabetes.    Referral to [facility] ophthalmology for follow up OCT/VF- last testing was in 2014.    Eyeglass prescription given.    Return in 1 year for a complete eye exam or sooner if needed.    Yoseph Ortiz, OD

## 2017-09-26 NOTE — MR AVS SNAPSHOT
After Visit Summary   9/26/2017    Geovany Marte    MRN: [id number]           Patient Information     Date Of Birth          1965        Visit Information        Provider Department      9/26/2017 4:00 PM Yoseph Ortiz OD [facility]        Today's Diagnoses     Type 2 diabetes mellitus without complication, without long-term current use of insulin (H)    -  1    No diabetic retinopathy in both eyes        Glaucoma suspect, bilateral        Hyperopia, bilateral        Presbyopia          Care Instructions    There are not any signs of the diabetes affecting the eyes today.  It is important that you get your eyes dilated once yearly and keep good control of your diabetes.    Referral to [facility] ophthalmology for follow up OCT/VF- last testing was in 2014.    Eyeglass prescription given.    Return in 1 year for a complete eye exam or sooner if needed.    Yoseph Ortiz OD            Follow-ups after your visit        Additional Services     OPHTHALMOLOGY ADULT REFERRAL       Your provider has referred you to:  [facility]: [facility] [phone]   [email or web address]      Please be aware that coverage of these services is subject to the terms and limitations of your health insurance plan.  Call member services at your health plan with any benefit or coverage questions.      Please bring the following to your appointment:  >>   Any x-rays, CTs or MRIs which have been performed.  Contact the facility where they were done to arrange for  prior to your scheduled appointment.  Any new CT, MRI or other procedures ordered by your specialist must be performed at a [facility] facility or coordinated by your clinic's referral office.    >>   List of current medications   >>   This referral request   >>   Any documents/labs given to you for this referral                  Follow-up notes from your care team     Return in about 1 year (around 9/26/2018) for  Annual Visit.      Who to contact     If you have questions or need follow up information about today's clinic visit or your schedule please contact [facility] directly at [phone].  Normal or non-critical lab and imaging results will be communicated to you by MyChart, letter or phone within 4 business days after the clinic has received the results. If you do not hear from us within 7 days, please contact the clinic through MyChart or phone. If you have a critical or abnormal lab result, we will notify you by phone as soon as possible.  Submit refill requests through [employer] Lanes or call your pharmacy and they will forward the refill request to us. Please allow 3 business days for your refill to be completed.          Additional Information About Your Visit        Camera Service & Integrationhart Information     Buzz Lanes gives you secure access to your electronic health record. If you see a primary care provider, you can also send messages to your care team and make appointments. If you have questions, please call your primary care clinic.  If you do not have a primary care provider, please call [phone] and they will assist you.        Care EveryWhere ID     This is your Care EveryWhere ID. This could be used by other organizations to access your [facility] medical records  [id number]         Blood Pressure from Last 3 Encounters:   09/25/17 134/70   04/03/17 138/82   10/03/16 136/84    Weight from Last 3 Encounters:   09/25/17 (!) 151.5 kg (334 lb)   04/03/17 (!) 151 kg (333 lb)   10/03/16 (!) 143.9 kg (317 lb 3.2 oz)              We Performed the Following     EYE EXAM (SIMPLE-NONBILLABLE)     OPHTHALMOLOGY ADULT REFERRAL     REFRACTION        Primary Care Provider Office Phone # Fax #    Mona Scales [phone]       [address]  [facility] [address]        Equal Access to Services     TAWNYA CHARLES AH: Angie Chun, wabarbara vazquez, qajosé terry  omega salinasemilee villafanaaan ah. So [facility] [phone].    ATENCIÓN: Si habla dinesh, tiene a cortes disposición servicios gratuitos de asistencia lingüística. Joseph al [phone].    We comply with applicable federal civil rights laws and [place] laws. We do not discriminate on the basis of race, color, national origin, age, disability sex, sexual orientation or gender identity.            Thank you!     Thank you for choosing [facility]  for your care. Our goal is always to provide you with excellent care. Hearing back from our patients is one way we can continue to improve our services. Please take a few minutes to complete the written survey that you may receive in the mail after your visit with us. Thank you!             Your Updated Medication List - Protect others around you: Learn how to safely use, store and throw away your medicines at www.disposemymeds.org.          This list is accurate as of: 9/26/17  6:00 PM.  Always use your most recent med list.                   Brand Name Dispense Instructions for use Diagnosis    ACE/ARB NOT PRESCRIBED (INTENTIONAL)      continuous prn for other Reported on 4/3/2017    Type 2 diabetes mellitus without complication (H)       albuterol 108 (90 BASE) MCG/ACT Inhaler    PROAIR HFA    1 Inhaler    Inhale 2 puffs into the lungs every 4 hours as needed (for asthma symptoms)    Intermittent asthma, uncomplicated       amLODIPine 10 MG tablet    NORVASC    90 tablet    Take 1 tablet (10 mg) by mouth daily    Hypertension goal BP (blood pressure) < 140/90       aspirin 81 MG tablet      1 TABLET DAILY (*)        blood glucose monitoring test strip    ONE TOUCH ULTRA    1 Box    by In Vitro route 2 times daily.    Type 2 diabetes, HbA1c goal < 7% (H)       carvedilol 25 MG tablet    COREG    180 tablet    TAKE 1 TABLET BY MOUTH 2 TIMES DAILY (WITH MEALS)    Hypertension goal BP (blood pressure) < 140/90       CITRACAL CALCIUM+D PO       3 times daily. Take one tablet twice daily.        cyanocobalamin 1000 MCG/ML injection    VITAMIN B12    1 mL    Inject 1 mL (1,000 mcg) Subcutaneous every 30 days    Achlorhydria, gastric       fluticasone 50 MCG/ACT spray    FLONASE    1 Package    Spray 1-2 sprays into both nostrils daily    Seasonal allergic rhinitis       order for DME     1 kit    One touch glucometer with supplies to replace old meter for testing twice daily    Type 2 diabetes, HbA1c goal < 7% (H)       order for DME     12 each    Injection Supplies for Vitamin B12: 3cc syringes w/ 27 gauge needles, 1/2 inch length    Achlorhydria, gastric       pravastatin 80 MG tablet    PRAVACHOL    90 tablet    Take 1 tablet (80 mg) by mouth every evening    Hyperlipidemia LDL goal <100

## 2017-09-26 NOTE — Clinical Note
Jerry Epstein- what the heck-it is glaucoma suspect day!  Please call Geovany on his cell to schedule OCT/VF- last one was 2014.  His wife.   [phone] Also needs an initial glaucoma work up.  You can call Geovany to schedule both.  Thanks!!

## 2017-09-27 ENCOUNTER — TELEPHONE (OUTPATIENT)
Dept: OPTOMETRY | Facility: CLINIC | Age: 52
End: 2017-09-27

## 2017-09-27 NOTE — TELEPHONE ENCOUNTER
9/27/17 9:48 a.m. I left a message for the patient to call back and schedule an appointment for himself and his spouse, MRN [id number](Steffanie), for glaucoma eval. Both patients were seen by Dr. Ortiz on 9/26/17.

## 2017-09-29 NOTE — PROGRESS NOTES
Mr. Marte,    Your kidney function is stable.  Your diabetes has worsened slightly.  I would recommend you follow up again in 6 months since things have worsened.    Please contact the clinic if you have additional questions.  Thank you.    Sincerely,    Mona Scales

## 2017-10-12 ENCOUNTER — OFFICE VISIT (OUTPATIENT)
Dept: OPHTHALMOLOGY | Facility: CLINIC | Age: 52
End: 2017-10-12
Payer: COMMERCIAL

## 2017-10-12 DIAGNOSIS — H25.13 NUCLEAR SCLEROSIS OF BOTH EYES: ICD-10-CM

## 2017-10-12 DIAGNOSIS — H40.003 GLAUCOMA SUSPECT OF BOTH EYES: Primary | ICD-10-CM

## 2017-10-12 PROCEDURE — 76514 ECHO EXAM OF EYE THICKNESS: CPT | Performed by: STUDENT IN AN ORGANIZED HEALTH CARE EDUCATION/TRAINING PROGRAM

## 2017-10-12 PROCEDURE — 92002 INTRM OPH EXAM NEW PATIENT: CPT | Performed by: STUDENT IN AN ORGANIZED HEALTH CARE EDUCATION/TRAINING PROGRAM

## 2017-10-12 PROCEDURE — 92133 CPTRZD OPH DX IMG PST SGM ON: CPT | Performed by: STUDENT IN AN ORGANIZED HEALTH CARE EDUCATION/TRAINING PROGRAM

## 2017-10-12 PROCEDURE — 92083 EXTENDED VISUAL FIELD XM: CPT | Performed by: STUDENT IN AN ORGANIZED HEALTH CARE EDUCATION/TRAINING PROGRAM

## 2017-10-12 ASSESSMENT — EXTERNAL EXAM - RIGHT EYE: OD_EXAM: NORMAL

## 2017-10-12 ASSESSMENT — PACHYMETRY
OS_CT(UM): 520
OD_CT(UM): 530

## 2017-10-12 ASSESSMENT — TONOMETRY
IOP_METHOD: TONOPEN
OS_IOP_MMHG: 11
OD_IOP_MMHG: 13

## 2017-10-12 ASSESSMENT — SLIT LAMP EXAM - LIDS
COMMENTS: NORMAL
COMMENTS: NORMAL

## 2017-10-12 ASSESSMENT — VISUAL ACUITY
OS_SC: 20/25
OD_SC: 20/30
OD_PH_SC: 20/20
METHOD: SNELLEN - LINEAR
OS_PH_SC: 20/20

## 2017-10-12 ASSESSMENT — EXTERNAL EXAM - LEFT EYE: OS_EXAM: NORMAL

## 2017-10-12 NOTE — MR AVS SNAPSHOT
"              After Visit Summary   10/12/2017    Geovany Marte    MRN: [id number]           Patient Information     Date Of Birth          1965        Visit Information        Provider Department      10/12/2017 2:15 PM Jason Louie MD [facility]        Today's Diagnoses     Glaucoma suspect of both eyes    -  1    Nuclear sclerosis of both eyes          Care Instructions    Continue to monitor for glaucoma suspicion  Use artificial tears up to 4 times per day (Refresh Plus, Systane Balance, or generic artificial tears are ok. Avoid \"get the red out\" drops).     Jason Louie MD  [phone]            Follow-ups after your visit        Follow-up notes from your care team     Return in about 2 years (around 10/12/2019) for IOP check, HVF, OCT optic nerve.      Future tests that were ordered for you today     Open Future Orders        Priority Expected Expires Ordered    PACHYMETRY Routine  11/26/2017 10/12/2017            Who to contact     If you have questions or need follow up information about today's clinic visit or your schedule please contact [facility] directly at [phone].  Normal or non-critical lab and imaging results will be communicated to you by Fotoshkolahart, letter or phone within 4 business days after the clinic has received the results. If you do not hear from us within 7 days, please contact the clinic through Fotoshkolahart or phone. If you have a critical or abnormal lab result, we will notify you by phone as soon as possible.  Submit refill requests through VUELOGIC or call your pharmacy and they will forward the refill request to us. Please allow 3 business days for your refill to be completed.          Additional Information About Your Visit        Fotoshkolahart Information     VUELOGIC gives you secure access to your electronic health record. If you see a primary care provider, you can also send messages to your care team and make appointments. If you have " questions, please call your primary care clinic.  If you do not have a primary care provider, please call [phone] and they will assist you.        Care EveryWhere ID     This is your Care EveryWhere ID. This could be used by other organizations to access your [facility] medical records  [id number]         Blood Pressure from Last 3 Encounters:   09/25/17 134/70   04/03/17 138/82   10/03/16 136/84    Weight from Last 3 Encounters:   09/25/17 (!) 151.5 kg (334 lb)   04/03/17 (!) 151 kg (333 lb)   10/03/16 (!) 143.9 kg (317 lb 3.2 oz)              We Performed the Following     HC COMPUTERIZED OPHTHALMIC IMAGING OPTIC NERVE     VISUAL FIELDS EXAM (EXTENDED)        Primary Care Provider Office Phone # Fax #    Mona Hodgesstephanie Scales [phone]       [id number] [facility] [id number]        Equal Access to Services     [facility] : Hadii aad ku hadasho Soomaali, waaxda luqadaha, qaybta kaalmada adeegyada, waxay idiin hayaan adeeg kharash la'caseyn . So [facility] [phone].    ATENCIÓN: Si habla español, tiene a cortes disposición servicios gratuitos de asistencia lingüística. Llame al [phone].    We comply with applicable federal civil rights laws and [place] laws. We do not discriminate on the basis of race, color, national origin, age, disability, sex, sexual orientation, or gender identity.            Thank you!     Thank you for choosing [facility]  for your care. Our goal is always to provide you with excellent care. Hearing back from our patients is one way we can continue to improve our services. Please take a few minutes to complete the written survey that you may receive in the mail after your visit with us. Thank you!             Your Updated Medication List - Protect others around you: Learn how to safely use, store and throw away your medicines at www.disposemymeds.org.          This list is accurate as of: 10/12/17  3:16 PM.  Always use your most  recent med list.                   Brand Name Dispense Instructions for use Diagnosis    ACE/ARB NOT PRESCRIBED (INTENTIONAL)      continuous prn for other Reported on 4/3/2017    Type 2 diabetes mellitus without complication (H)       albuterol 108 (90 BASE) MCG/ACT Inhaler    PROAIR HFA    1 Inhaler    Inhale 2 puffs into the lungs every 4 hours as needed (for asthma symptoms)    Intermittent asthma, uncomplicated       amLODIPine 10 MG tablet    NORVASC    90 tablet    Take 1 tablet (10 mg) by mouth daily    Hypertension goal BP (blood pressure) < 140/90       aspirin 81 MG tablet      1 TABLET DAILY (*)        blood glucose monitoring test strip    ONE TOUCH ULTRA    1 Box    by In Vitro route 2 times daily.    Type 2 diabetes, HbA1c goal < 7% (H)       carvedilol 25 MG tablet    COREG    180 tablet    TAKE 1 TABLET BY MOUTH 2 TIMES DAILY (WITH MEALS)    Hypertension goal BP (blood pressure) < 140/90       CITRACAL CALCIUM+D PO      3 times daily. Take one tablet twice daily.        cyanocobalamin 1000 MCG/ML injection    VITAMIN B12    1 mL    Inject 1 mL (1,000 mcg) Subcutaneous every 30 days    Achlorhydria, gastric       fluticasone 50 MCG/ACT spray    FLONASE    1 Package    Spray 1-2 sprays into both nostrils daily    Seasonal allergic rhinitis       order for DME     1 kit    One touch glucometer with supplies to replace old meter for testing twice daily    Type 2 diabetes, HbA1c goal < 7% (H)       order for DME     12 each    Injection Supplies for Vitamin B12: 3cc syringes w/ 27 gauge needles, 1/2 inch length    Achlorhydria, gastric       pravastatin 80 MG tablet    PRAVACHOL    90 tablet    Take 1 tablet (80 mg) by mouth every evening    Hyperlipidemia LDL goal <100

## 2017-10-12 NOTE — PATIENT INSTRUCTIONS
"Continue to monitor for glaucoma suspicion  Use artificial tears up to 4 times per day (Refresh Plus, Systane Balance, or generic artificial tears are ok. Avoid \"get the red out\" drops).     Jason Louie MD  [phone]    "

## 2017-10-12 NOTE — PROGRESS NOTES
" Current Eye Medcations:  Tears prn     Subjective:  Glaucoma evaluation per Dr. Ortiz.  Pt reports that his vision is good, does have a problem with dry eyes.   Objective:  See Ophthalmology Exam.      Assessment:  Geovany Marte is a 51 year old male who presents with:     Glaucoma suspect of both eyes OCT within normal limits and stable both eyes.  Logan visual field (HVF) within normal limits right eye, high false positives and superior loss right eye (likely due to low reliability).     Nuclear sclerosis of both eyes        Plan:  Continue to monitor for glaucoma suspicion  Use artificial tears up to 4 times per day (Refresh Plus, Systane Balance, or generic artificial tears are ok. Avoid \"get the red out\" drops).   Recheck glaucoma tests in 2 years.     Jason Louie MD  [phone]           "

## 2017-12-18 ENCOUNTER — TELEPHONE (OUTPATIENT)
Dept: FAMILY MEDICINE | Facility: CLINIC | Age: 52
End: 2017-12-18

## 2017-12-18 DIAGNOSIS — E78.5 HYPERLIPIDEMIA LDL GOAL <100: ICD-10-CM

## 2017-12-18 NOTE — TELEPHONE ENCOUNTER
pravastatin (PRAVACHOL) 80 MG tablet 90 tablet 3 4/3/2017      Should have refills at this pharmacy.

## 2017-12-20 RX ORDER — PRAVASTATIN SODIUM 80 MG/1
TABLET ORAL
Qty: 90 TABLET | Refills: 0 | Status: SHIPPED | OUTPATIENT
Start: 2017-12-20 | End: 2018-03-07

## 2017-12-20 NOTE — TELEPHONE ENCOUNTER
Requested Prescriptions   Pending Prescriptions Disp Refills     pravastatin (PRAVACHOL) 80 MG tablet [Pharmacy Med Name: PRAVASTATIN SODIUM 80 MG TAB] 90 tablet 0     Sig: TAKE 1 TABLET (80 MG) BY MOUTH EVERY EVENING    Statins Protocol Passed    12/18/2017  9:27 AM       Passed - LDL on file in past 12 months    Recent Labs   Lab Test  03/23/17   0755   LDL  113*            Passed - No abnormal creatine kinase in past 12 months    No lab results found.         Passed - Recent or future visit with authorizing provider    Patient had office visit in the last year or has a visit in the next 30 days with authorizing provider.  See chart review.              Passed - Patient is age 18 or older        Called pharmacy and in May they gave an override for a 30 day supply. It can only be done once and that shorted the 90 day. The patients insurance will only allow for a 90. Sent in for 90 as protocol above states approved.    Beatriz Frias RN, [facility]

## 2017-12-20 NOTE — TELEPHONE ENCOUNTER
Reason for Call:  Other prescription    Detailed comments: Pharmacy calling for they acknolwedge that refills are available it is just there not enough refills available for a 90 day supply.      Phone Number Pharmacy  can be reached at: Other phone number:  [phone]    Best Time: anytime    Can we leave a detailed message on this number? YES    Call taken on 12/20/2017 at 11:15 AM by Alberto rios calling for they acknolwedge that refills are available it is just there not enough refills available for a 90 day supply.

## 2018-02-11 DIAGNOSIS — I10 HYPERTENSION GOAL BP (BLOOD PRESSURE) < 140/90: ICD-10-CM

## 2018-02-11 NOTE — TELEPHONE ENCOUNTER
"Requested Prescriptions   Pending Prescriptions Disp Refills     carvedilol (COREG) 25 MG tablet [Pharmacy Med Name: CARVEDILOL 25 MG TABLET] 180 tablet 2    Last Written Prescription Date:  5/23/17  Last Fill Quantity: 180,  # refills: 2   Last Office Visit with [facility], P or [facility] prescribing provider:  9/25/2017     Future Office Visit:      Sig: TAKE 1 TABLET BY MOUTH 2 TIMES DAILY WITH MEALS    Beta-Blockers Protocol Passed    2/11/2018  8:10 AM       Passed - Blood pressure under 140/90    BP Readings from Last 3 Encounters:   09/25/17 134/70   04/03/17 138/82   10/03/16 136/84                Passed - Patient is age 6 or older       Passed - Recent or future visit with authorizing provider's specialty    Patient had office visit in the last year or has a visit in the next 30 days with authorizing provider.  See \"Patient Info\" tab in inbasket, or \"Choose Columns\" in Meds & Orders section of the refill encounter.               "

## 2018-02-16 RX ORDER — CARVEDILOL 25 MG/1
TABLET ORAL
Qty: 60 TABLET | Refills: 1 | Status: SHIPPED | OUTPATIENT
Start: 2018-02-16 | End: 2018-03-07

## 2018-02-16 NOTE — TELEPHONE ENCOUNTER
Medication is being filled for 1 time refill only due to:  Patient needs to be seen because per last OV; pt is to return to clinic in 6 months.  Please contact pt..

## 2018-02-16 NOTE — TELEPHONE ENCOUNTER
This writer attempted to contact Patient on 02/16/18      Reason for call Patient given a delores refill and office is needed and left message to return call.      If patient calls back:   Schedule Office Visit appointment within 2 weeks with PCP.  Sent My Chart message.        Gabriela Anthony MA

## 2018-03-02 ENCOUNTER — DOCUMENTATION ONLY (OUTPATIENT)
Dept: LAB | Facility: CLINIC | Age: 53
End: 2018-03-02

## 2018-03-02 DIAGNOSIS — E11.9 TYPE 2 DIABETES MELLITUS WITHOUT COMPLICATION, WITHOUT LONG-TERM CURRENT USE OF INSULIN (H): Primary | ICD-10-CM

## 2018-03-02 NOTE — PROGRESS NOTES
This patient has a lab only appointment on 3/7/2018 but does not have future orders. Please review, associate diagnosis and sign pending lab orders for the upcoming appointment.  He has an appointment with you on 3/7/2018 shortly after his lab appointment.    Thank you,    [facility]

## 2018-03-07 ENCOUNTER — OFFICE VISIT (OUTPATIENT)
Dept: FAMILY MEDICINE | Facility: CLINIC | Age: 53
End: 2018-03-07
Payer: COMMERCIAL

## 2018-03-07 VITALS
HEIGHT: 73 IN | RESPIRATION RATE: 20 BRPM | WEIGHT: 315 LBS | DIASTOLIC BLOOD PRESSURE: 80 MMHG | BODY MASS INDEX: 41.75 KG/M2 | TEMPERATURE: 97.4 F | OXYGEN SATURATION: 97 % | HEART RATE: 71 BPM | SYSTOLIC BLOOD PRESSURE: 130 MMHG

## 2018-03-07 DIAGNOSIS — I10 HYPERTENSION GOAL BP (BLOOD PRESSURE) < 140/90: ICD-10-CM

## 2018-03-07 DIAGNOSIS — E78.5 HYPERLIPIDEMIA LDL GOAL <100: ICD-10-CM

## 2018-03-07 DIAGNOSIS — Z98.84 BARIATRIC SURGERY STATUS: ICD-10-CM

## 2018-03-07 DIAGNOSIS — J45.20 INTERMITTENT ASTHMA, UNCOMPLICATED: ICD-10-CM

## 2018-03-07 DIAGNOSIS — E66.01 MORBID OBESITY (H): ICD-10-CM

## 2018-03-07 DIAGNOSIS — B07.0 PLANTAR WARTS: ICD-10-CM

## 2018-03-07 DIAGNOSIS — J31.0 CHRONIC RHINITIS, UNSPECIFIED TYPE: ICD-10-CM

## 2018-03-07 DIAGNOSIS — E11.9 TYPE 2 DIABETES MELLITUS WITHOUT COMPLICATION, WITHOUT LONG-TERM CURRENT USE OF INSULIN (H): Primary | ICD-10-CM

## 2018-03-07 DIAGNOSIS — N53.14 RETROGRADE EJACULATION: ICD-10-CM

## 2018-03-07 DIAGNOSIS — E11.9 TYPE 2 DIABETES MELLITUS WITHOUT COMPLICATION, WITHOUT LONG-TERM CURRENT USE OF INSULIN (H): ICD-10-CM

## 2018-03-07 LAB
CHOLEST SERPL-MCNC: 162 MG/DL
CREAT UR-MCNC: 230 MG/DL
HBA1C MFR BLD: 6.2 % (ref 4.3–6)
HDLC SERPL-MCNC: 45 MG/DL
LDLC SERPL CALC-MCNC: 92 MG/DL
MICROALBUMIN UR-MCNC: 7 MG/L
MICROALBUMIN/CREAT UR: 2.94 MG/G CR (ref 0–17)
NONHDLC SERPL-MCNC: 117 MG/DL
TRIGL SERPL-MCNC: 123 MG/DL
TSH SERPL DL<=0.005 MIU/L-ACNC: 0.58 MU/L (ref 0.4–4)
VIT B12 SERPL-MCNC: 3652 PG/ML (ref 193–986)

## 2018-03-07 PROCEDURE — 84443 ASSAY THYROID STIM HORMONE: CPT | Performed by: FAMILY MEDICINE

## 2018-03-07 PROCEDURE — 80061 LIPID PANEL: CPT | Performed by: FAMILY MEDICINE

## 2018-03-07 PROCEDURE — 83036 HEMOGLOBIN GLYCOSYLATED A1C: CPT | Performed by: FAMILY MEDICINE

## 2018-03-07 PROCEDURE — 36415 COLL VENOUS BLD VENIPUNCTURE: CPT | Performed by: FAMILY MEDICINE

## 2018-03-07 PROCEDURE — 82607 VITAMIN B-12: CPT | Performed by: FAMILY MEDICINE

## 2018-03-07 PROCEDURE — 99214 OFFICE O/P EST MOD 30 MIN: CPT | Performed by: FAMILY MEDICINE

## 2018-03-07 PROCEDURE — 82043 UR ALBUMIN QUANTITATIVE: CPT | Performed by: FAMILY MEDICINE

## 2018-03-07 RX ORDER — CARVEDILOL 25 MG/1
25 TABLET ORAL 2 TIMES DAILY WITH MEALS
Qty: 180 TABLET | Refills: 1 | Status: SHIPPED | OUTPATIENT
Start: 2018-03-07 | End: 2018-04-16

## 2018-03-07 RX ORDER — AMLODIPINE BESYLATE 10 MG/1
10 TABLET ORAL DAILY
Qty: 90 TABLET | Refills: 1 | Status: SHIPPED | OUTPATIENT
Start: 2018-03-07 | End: 2018-10-03

## 2018-03-07 RX ORDER — PRAVASTATIN SODIUM 80 MG/1
80 TABLET ORAL EVERY EVENING
Qty: 90 TABLET | Refills: 3 | Status: SHIPPED | OUTPATIENT
Start: 2018-03-07 | End: 2019-03-14

## 2018-03-07 RX ORDER — ALBUTEROL SULFATE 90 UG/1
2 AEROSOL, METERED RESPIRATORY (INHALATION) EVERY 4 HOURS PRN
Qty: 1 INHALER | Refills: 2 | Status: SHIPPED | OUTPATIENT
Start: 2018-03-07 | End: 2019-03-14

## 2018-03-07 ASSESSMENT — PAIN SCALES - GENERAL: PAINLEVEL: NO PAIN (0)

## 2018-03-07 NOTE — PROGRESS NOTES
SUBJECTIVE:   Geovany Marte is a 52 year old male who presents to clinic today for the following health issues:  Diabetes Follow-up    Patient is checking blood sugars: once daily.  Results are as follows:         lunchtime - 120    Diabetic concerns: None     Symptoms of hypoglycemia (low blood sugar): none     Paresthesias (numbness or burning in feet) or sores: No     Date of last diabetic eye exam: last year 2017    Hyperlipidemia Follow-Up      Rate your low fat/cholesterol diet?: not monitoring fat    Taking statin?  Yes, no muscle aches from statin, stiffness in the shoulders    Other lipid medications/supplements?:  none    Hypertension Follow-up      Outpatient blood pressures are being checked at store.  Results are 150's unsure of the bottom number.    Low Salt Diet: not monitoring salt    BP Readings from Last 2 Encounters:   03/07/18 130/80   09/25/17 134/70     Hemoglobin A1C (%)   Date Value   03/07/2018 6.2 (H)   09/25/2017 6.2 (H)     LDL Cholesterol Calculated (mg/dL)   Date Value   03/23/2017 113 (H)   02/08/2016 88     Asthma Follow-Up    Was ACT completed today?    Yes    ACT Total Scores 3/7/2018   ACT TOTAL SCORE -   ASTHMA ER VISITS -   ASTHMA HOSPITALIZATIONS -   ACT TOTAL SCORE (Goal Greater than or Equal to 20) 24   In the past 12 months, how many times did you visit the emergency room for your asthma without being admitted to the hospital? 0   In the past 12 months, how many times were you hospitalized overnight because of your asthma? 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  Filing it down.  No significant pain.    Ejaculation issues for few months. No sperm or semen with ejaculation.   No pain.      Problem list and histories reviewed & adjusted, as indicated.  Additional history: as documented    Patient Active Problem List   Diagnosis     Hyperlipidemia LDL goal <100     Type 2 diabetes, HbA1c goal < 7% (H)     Disturbance of skin sensation     Intermittent asthma     Hypertension goal BP (blood pressure) < 140/90     Dry eye syndrome     Nuclear sclerosis     Erectile dysfunction     Glaucoma suspect     Gout     Seasonal allergic rhinitis     Morbid obesity (H)     Retrograde ejaculation     Bariatric surgery status     Chronic rhinitis, unspecified type     Plantar warts     Past Surgical History:   Procedure Laterality Date     ENT SURGERY      tonsils removed at 19 years old     ESOPHAGOSCOPY, GASTROSCOPY, DUODENOSCOPY (EGD), COMBINED N/A 3/18/2015    Procedure: COMBINED ESOPHAGOSCOPY, GASTROSCOPY, DUODENOSCOPY (EGD);  Surgeon: Jae Robles MD;  Location:  GI     LAPAROSCOPIC BYPASS GASTRIC  6/22/2011    Procedure:LAPAROSCOPIC BYPASS GASTRIC; Surgeon:HANNAH SHEPARD; Location: OR       Social History   Substance Use Topics     Smoking status: Never Smoker     Smokeless tobacco: Never Used      Comment: 1994     Alcohol use 0.0 oz/week     0 Standard drinks or equivalent per week      Comment: Once a week.     Family History   Problem Relation Age of Onset     CEREBROVASCULAR DISEASE Mother      Hypertension Mother      DIABETES Father      Hypertension Father      HEART DISEASE Brother      Breast Cancer Sister      CANCER Sister      CEREBROVASCULAR DISEASE Brother      Breast Cancer Sister      Asthma Son      Thyroid Disease No family hx of      Glaucoma No family hx of      Macular Degeneration No family hx of            Reviewed and updated as needed this visit by clinical staff  Tobacco  Allergies  Meds  Problems       Reviewed and updated as needed this visit by Provider  Tobacco  Allergies  Problems         ROS:  Constitutional, HEENT, cardiovascular,  "pulmonary, gi and gu systems are negative, except as otherwise noted.    OBJECTIVE:     /80  Pulse 71  Temp 97.4  F (36.3  C) (Oral)  Resp 20  Ht 6' 1\" (1.854 m)  Wt (!) 330 lb 9.6 oz (150 kg)  SpO2 97%  BMI 43.62 kg/m2  Body mass index is 43.62 kg/(m^2).  GENERAL: healthy, alert, no distress and obese  HEENT: pale boggy nasal mucosa  NECK: no adenopathy, no asymmetry, masses, or scars and thyroid normal to palpation  RESP: lungs clear to auscultation - no rales, rhonchi or wheezes  CV: regular rate and rhythm, normal S1 S2, no S3 or S4, no murmur, click or rub, no peripheral edema and peripheral pulses strong  ABDOMEN: soft, nontender, no hepatosplenomegaly, no masses and bowel sounds normal  MS: no gross musculoskeletal defects noted, no edema  SKIN: wart left foot  PSYCH: mentation appears normal, affect normal/bright  Normal monofilament exam    Diagnostic Test Results:  Results for orders placed or performed in visit on 03/07/18 (from the past 24 hour(s))   **A1C FUTURE anytime   Result Value Ref Range    Hemoglobin A1C 6.2 (H) 4.3 - 6.0 %       ASSESSMENT/PLAN:     1. Type 2 diabetes mellitus without complication, without long-term current use of insulin (H)  Stable - continue current treatment/dietary changes.    2. Hyperlipidemia LDL goal <100  Continue current treatment.  - pravastatin (PRAVACHOL) 80 MG tablet; Take 1 tablet (80 mg) by mouth every evening  Dispense: 90 tablet; Refill: 3    3. Retrograde ejaculation  Discussed etiology and urology follow up, patient refused for now.    4. Morbid obesity (H)  Low carb eating    5. Hypertension goal BP (blood pressure) < 140/90  Controlled - continue current treatment.  - carvedilol (COREG) 25 MG tablet; Take 1 tablet (25 mg) by mouth 2 times daily (with meals)  Dispense: 180 tablet; Refill: 1  - amLODIPine (NORVASC) 10 MG tablet; Take 1 tablet (10 mg) by mouth daily  Dispense: 90 tablet; Refill: 1    6. Chronic rhinitis, unspecified type  See " PI    7. Bariatric surgery status  Check B12 as patient has not been doing injections and is taking an oral supplement  - Vitamin B12    8. Intermittent asthma, uncomplicated  Stable - continue current treatment.  - albuterol (PROAIR HFA) 108 (90 BASE) MCG/ACT Inhaler; Inhale 2 puffs into the lungs every 4 hours as needed (for asthma symptoms)  Dispense: 1 Inhaler; Refill: 2    9. Plantar warts  Discussed OTC treatments and cryotherapy, patient opted for OTC.    The uses and side effects, including black box warnings as appropriate, were discussed in detail.  All patient questions were answered.  The patient was instructed to call immediately if any side effects developed.       FUTURE APPOINTMENTS:       - Follow-up visit in 6 months or prn.    Mona Scales MD  [facility]

## 2018-03-07 NOTE — MR AVS SNAPSHOT
After Visit Summary   3/7/2018    Geovany Marte    MRN: [id number]           Patient Information     Date Of Birth          1965        Visit Information        Provider Department      3/7/2018 8:40 AM Mona Lin MD [facility]        Today's Diagnoses     Type 2 diabetes mellitus without complication, without long-term current use of insulin (H)    -  1    Hyperlipidemia LDL goal <100        Retrograde ejaculation        Morbid obesity (H)        Hypertension goal BP (blood pressure) < 140/90        Chronic rhinitis, unspecified type        Bariatric surgery status        Intermittent asthma, uncomplicated        Plantar warts          Care Instructions    Follow up in 2 months if still having issues with the wart.    Afrin can be used up to three days at a time for as needed nasal congestion.  Flonase or similar nasal spray can be used daily to prevent congestion and sinus pressure.    If you start to notice pain or other problems when you orgasm please let me know.          Follow-ups after your visit        Follow-up notes from your care team     Return in about 6 months (around 9/7/2018) for diabetes.      Who to contact     If you have questions or need follow up information about today's clinic visit or your schedule please contact [facility] directly at [phone].  Normal or non-critical lab and imaging results will be communicated to you by MyChart, letter or phone within 4 business days after the clinic has received the results. If you do not hear from us within 7 days, please contact the clinic through MyChart or phone. If you have a critical or abnormal lab result, we will notify you by phone as soon as possible.  Submit refill requests through [email or web address] or call your pharmacy and they will forward the refill request to us. Please allow 3 business days for your refill to be completed.          Additional Information About Your  "Visit        [employer] Information     [employer] gives you secure access to your electronic health record. If you see a primary care provider, you can also send messages to your care team and make appointments. If you have questions, please call your primary care clinic.  If you do not have a primary care provider, please call [phone] and they will assist you.        Care EveryWhere ID     This is your Care EveryWhere ID. This could be used by other organizations to access your [place] medical records  [id number]        Your Vitals Were     Pulse Temperature Respirations Height Pulse Oximetry BMI (Body Mass Index)    71 97.4  F (36.3  C) (Oral) 20 6' 1\" (1.854 m) 97% 43.62 kg/m2       Blood Pressure from Last 3 Encounters:   03/07/18 130/80   09/25/17 134/70   04/03/17 138/82    Weight from Last 3 Encounters:   03/07/18 (!) 330 lb 9.6 oz (150 kg)   09/25/17 (!) 334 lb (151.5 kg)   04/03/17 (!) 333 lb (151 kg)              We Performed the Following     Vitamin B12          Today's Medication Changes          These changes are accurate as of 3/7/18  9:24 AM.  If you have any questions, ask your nurse or doctor.               These medicines have changed or have updated prescriptions.        Dose/Directions    carvedilol 25 MG tablet   Commonly known as:  COREG   This may have changed:  See the new instructions.   Used for:  Hypertension goal BP (blood pressure) < 140/90   Changed by:  Mona Lin MD        Dose:  25 mg   Take 1 tablet (25 mg) by mouth 2 times daily (with meals)   Quantity:  180 tablet   Refills:  1       pravastatin 80 MG tablet   Commonly known as:  PRAVACHOL   This may have changed:  See the new instructions.   Used for:  Hyperlipidemia LDL goal <100   Changed by:  Mona Lin MD        Dose:  80 mg   Take 1 tablet (80 mg) by mouth every evening   Quantity:  90 tablet   Refills:  3            Where to get your medicines      These medications were sent to " [id number] - [facility], [place] - [address]  [address], [facility] [address]     Phone:  [phone]     albuterol 108 (90 BASE) MCG/ACT Inhaler    amLODIPine 10 MG tablet    carvedilol 25 MG tablet    pravastatin 80 MG tablet                Primary Care Provider Office Phone # Fax #    Mona Mastersbenjamin Scales [phone]       [address]  [facility] [address]        Equal Access to Services     [facility]: Hadii aad ku hadasho Soomaali, waaxda luqadaha, qaybta kaalmada adeegyada, waxay idiin hayaan adeemilee hagenaratheo guajardo . So [facility] [phone].    ATENCIÓN: Si habla español, tiene a cortes disposición servicios gratuitos de asistencia lingüística. [facility] [phone].    We comply with applicable federal civil rights laws and [place] laws. We do not discriminate on the basis of race, color, national origin, age, disability, sex, sexual orientation, or gender identity.            Thank you!     Thank you for choosing [facility]  for your care. Our goal is always to provide you with excellent care. Hearing back from our patients is one way we can continue to improve our services. Please take a few minutes to complete the written survey that you may receive in the mail after your visit with us. Thank you!             Your Updated Medication List - Protect others around you: Learn how to safely use, store and throw away your medicines at www.disposemymeds.org.          This list is accurate as of 3/7/18  9:24 AM.  Always use your most recent med list.                   Brand Name Dispense Instructions for use Diagnosis    ACE/ARB/ARNI NOT PRESCRIBED (INTENTIONAL)      continuous prn for other Reported on 4/3/2017    Type 2 diabetes mellitus without complication (H)       albuterol 108 (90 BASE) MCG/ACT Inhaler    PROAIR HFA    1 Inhaler    Inhale 2 puffs into the lungs every 4 hours as needed (for asthma symptoms)    Intermittent  asthma, uncomplicated       amLODIPine 10 MG tablet    NORVASC    90 tablet    Take 1 tablet (10 mg) by mouth daily    Hypertension goal BP (blood pressure) < 140/90       aspirin 81 MG tablet      1 TABLET DAILY (*)        blood glucose monitoring test strip    ONETOUCH ULTRA    1 Box    by In Vitro route 2 times daily.    Type 2 diabetes, HbA1c goal < 7% (H)       carvedilol 25 MG tablet    COREG    180 tablet    Take 1 tablet (25 mg) by mouth 2 times daily (with meals)    Hypertension goal BP (blood pressure) < 140/90       CITRACAL CALCIUM+D PO      3 times daily. Take one tablet twice daily.        fluticasone 50 MCG/ACT spray    FLONASE    1 Package    Spray 1-2 sprays into both nostrils daily    Seasonal allergic rhinitis       order for DME     1 kit    One touch glucometer with supplies to replace old meter for testing twice daily    Type 2 diabetes, HbA1c goal < 7% (H)       order for DME     12 each    Injection Supplies for Vitamin B12: 3cc syringes w/ 27 gauge needles, 1/2 inch length    Achlorhydria, gastric       pravastatin 80 MG tablet    PRAVACHOL    90 tablet    Take 1 tablet (80 mg) by mouth every evening    Hyperlipidemia LDL goal <100

## 2018-03-08 ASSESSMENT — ASTHMA QUESTIONNAIRES: ACT_TOTALSCORE: 24

## 2018-03-09 NOTE — PROGRESS NOTES
Mr. Marte,    Your vitamin B12 level is above normal. It does appear that you body is absorbing it but you are taking too much.  Please reduce your intake by half.    Please contact the clinic if you have additional questions.  Thank you.    Sincerely,    Mona Scales

## 2018-04-16 ENCOUNTER — MYC REFILL (OUTPATIENT)
Dept: FAMILY MEDICINE | Facility: CLINIC | Age: 53
End: 2018-04-16

## 2018-04-16 DIAGNOSIS — I10 HYPERTENSION GOAL BP (BLOOD PRESSURE) < 140/90: ICD-10-CM

## 2018-04-16 NOTE — TELEPHONE ENCOUNTER
Message from [employer]:  Original authorizing provider: Mona Scales MD    Geovany Marte would like a refill of the following medications:  carvedilol (COREG) 25 MG tablet [Mona Scales MD]    Preferred pharmacy: [id number] - [place], [place] - [address]    Comment:  Can you refill a 90 day supply they only have a 30 day refill and please send to the [place] [facility] thank you

## 2018-04-16 NOTE — TELEPHONE ENCOUNTER
"Requested Prescriptions   Pending Prescriptions Disp Refills     carvedilol (COREG) 25 MG tablet 180 tablet 1    Last Written Prescription Date:  3/7/18  Last Fill Quantity: 180,  # refills: 1   Last Office Visit with G, P or [facility] prescribing provider:  3/7/2018     Future Office Visit:      Sig: Take 1 tablet (25 mg) by mouth 2 times daily (with meals)    Beta-Blockers Protocol Passed    4/16/2018 12:34 PM       Passed - Blood pressure under 140/90 in past 12 months    BP Readings from Last 3 Encounters:   03/07/18 130/80   09/25/17 134/70   04/03/17 138/82                Passed - Patient is age 6 or older       Passed - Recent (12 mo) or future (30 days) visit within the authorizing provider's specialty    Patient had office visit in the last 12 months or has a visit in the next 30 days with authorizing provider or within the authorizing provider's specialty.  See \"Patient Info\" tab in inbasket, or \"Choose Columns\" in Meds & Orders section of the refill encounter.              "

## 2018-04-17 RX ORDER — CARVEDILOL 25 MG/1
25 TABLET ORAL 2 TIMES DAILY WITH MEALS
Qty: 180 TABLET | Refills: 3 | Status: SHIPPED | OUTPATIENT
Start: 2018-04-17 | End: 2019-04-23

## 2018-05-17 DIAGNOSIS — I10 HYPERTENSION GOAL BP (BLOOD PRESSURE) < 140/90: ICD-10-CM

## 2018-05-17 RX ORDER — AMLODIPINE BESYLATE 10 MG/1
TABLET ORAL
Qty: 90 TABLET | Refills: 1 | OUTPATIENT
Start: 2018-05-17

## 2018-10-03 ENCOUNTER — OFFICE VISIT (OUTPATIENT)
Dept: FAMILY MEDICINE | Facility: CLINIC | Age: 53
End: 2018-10-03
Payer: COMMERCIAL

## 2018-10-03 VITALS
SYSTOLIC BLOOD PRESSURE: 138 MMHG | HEART RATE: 61 BPM | TEMPERATURE: 98.8 F | BODY MASS INDEX: 44.46 KG/M2 | DIASTOLIC BLOOD PRESSURE: 88 MMHG | WEIGHT: 315 LBS | OXYGEN SATURATION: 98 %

## 2018-10-03 DIAGNOSIS — J30.9 CHRONIC ALLERGIC RHINITIS: ICD-10-CM

## 2018-10-03 DIAGNOSIS — E11.9 TYPE 2 DIABETES MELLITUS WITHOUT COMPLICATION, WITHOUT LONG-TERM CURRENT USE OF INSULIN (H): ICD-10-CM

## 2018-10-03 DIAGNOSIS — I10 HYPERTENSION GOAL BP (BLOOD PRESSURE) < 140/90: ICD-10-CM

## 2018-10-03 DIAGNOSIS — E78.5 HYPERLIPIDEMIA LDL GOAL <100: ICD-10-CM

## 2018-10-03 DIAGNOSIS — M25.512 ACUTE PAIN OF LEFT SHOULDER: Primary | ICD-10-CM

## 2018-10-03 DIAGNOSIS — Z23 NEED FOR PROPHYLACTIC VACCINATION AND INOCULATION AGAINST INFLUENZA: ICD-10-CM

## 2018-10-03 DIAGNOSIS — Z11.4 SCREENING FOR HIV (HUMAN IMMUNODEFICIENCY VIRUS): ICD-10-CM

## 2018-10-03 DIAGNOSIS — Z13.89 SCREENING FOR DIABETIC PERIPHERAL NEUROPATHY: ICD-10-CM

## 2018-10-03 LAB
ANION GAP SERPL CALCULATED.3IONS-SCNC: 6 MMOL/L (ref 3–14)
BUN SERPL-MCNC: 11 MG/DL (ref 7–30)
CALCIUM SERPL-MCNC: 9.2 MG/DL (ref 8.5–10.1)
CHLORIDE SERPL-SCNC: 104 MMOL/L (ref 94–109)
CO2 SERPL-SCNC: 29 MMOL/L (ref 20–32)
CREAT SERPL-MCNC: 0.91 MG/DL (ref 0.66–1.25)
GFR SERPL CREATININE-BSD FRML MDRD: 87 ML/MIN/1.7M2
GLUCOSE SERPL-MCNC: 134 MG/DL (ref 70–99)
HBA1C MFR BLD: 6.6 % (ref 0–5.6)
POTASSIUM SERPL-SCNC: 4 MMOL/L (ref 3.4–5.3)
SODIUM SERPL-SCNC: 139 MMOL/L (ref 133–144)

## 2018-10-03 PROCEDURE — 80048 BASIC METABOLIC PNL TOTAL CA: CPT | Performed by: FAMILY MEDICINE

## 2018-10-03 PROCEDURE — 36415 COLL VENOUS BLD VENIPUNCTURE: CPT | Performed by: FAMILY MEDICINE

## 2018-10-03 PROCEDURE — 90471 IMMUNIZATION ADMIN: CPT | Performed by: FAMILY MEDICINE

## 2018-10-03 PROCEDURE — 83036 HEMOGLOBIN GLYCOSYLATED A1C: CPT | Performed by: FAMILY MEDICINE

## 2018-10-03 PROCEDURE — 90682 RIV4 VACC RECOMBINANT DNA IM: CPT | Performed by: FAMILY MEDICINE

## 2018-10-03 PROCEDURE — 99214 OFFICE O/P EST MOD 30 MIN: CPT | Mod: 25 | Performed by: FAMILY MEDICINE

## 2018-10-03 PROCEDURE — 87389 HIV-1 AG W/HIV-1&-2 AB AG IA: CPT | Performed by: FAMILY MEDICINE

## 2018-10-03 PROCEDURE — 99207 C FOOT EXAM  NO CHARGE: CPT | Performed by: FAMILY MEDICINE

## 2018-10-03 RX ORDER — AMLODIPINE BESYLATE 10 MG/1
10 TABLET ORAL DAILY
Qty: 90 TABLET | Refills: 1 | Status: SHIPPED | OUTPATIENT
Start: 2018-10-03 | End: 2019-03-14

## 2018-10-03 NOTE — PATIENT INSTRUCTIONS
At [facility], we strive to deliver an exceptional experience to you, every time we see you.  If you receive a survey in the mail, please send us back your thoughts. We really do value your feedback.    Your care team:                            Family Medicine Internal Medicine   MD Gurpreet Howard MD Shantel Branch-Fleming, MD Katya Georgiev PA-C Megan Hill, APRN DILIP Yin MD Pediatrics   Dmitry Chavez, GABBIE Portillo, MD Mouna Richardson APRN CNP   MD Marge Willson MD Deborah Mielke, MD Candelaria Denton, APRN [facility]      Clinic hours: Monday - Thursday 7 am-7 pm; Fridays 7 am-5 pm.   Urgent care: Monday - Friday 11 am-9 pm; Saturday and Sunday 9 am-5 pm.  Pharmacy : Monday -Thursday 8 am-8 pm; Friday 8 am-6 pm; Saturday and Sunday 9 am-5 pm.     Clinic: [phone]   Pharmacy: [phone]

## 2018-10-03 NOTE — PROGRESS NOTES
SUBJECTIVE:   Geovany Marte is a 52 year old male who presents to clinic today for the following health issues:  Diabetes Follow-up  Patient is checking blood sugars: once daily.  Results are as follows:         varies through the day 110-118    Diabetic concerns: None     Symptoms of hypoglycemia (low blood sugar): none     Paresthesias (numbness or burning in feet) or sores: No     Date of last diabetic eye exam: DUE- will schedule appointment    BP Readings from Last 2 Encounters:   10/03/18 138/88   03/07/18 130/80     Hemoglobin A1C (%)   Date Value   03/07/2018 6.2 (H)   09/25/2017 6.2 (H)     LDL Cholesterol Calculated (mg/dL)   Date Value   03/07/2018 92   03/23/2017 113 (H)   Diabetes Management Resources    Joint Pain    Onset: 1 month ago    Description:   Location: left shoulder  Character: dull throbbing    Intensity: at night- 6/10, now 2/10    Progression of Symptoms: same    Accompanying Signs & Symptoms:  Other symptoms: none    History:   Previous similar pain: no       Precipitating factors:   Trauma or overuse: no   Therapies Tried and outcome: Took prescription he had left for his toe pain and it helped but he does not have medication anymore         Hypertension Follow-up      Outpatient blood pressures are not being checked.    Low Salt Diet: no added salt    Seen few weeks ago for nasal congestion and SOB.  Treated with albuterol neb and told to take afrin but has continued to use it longer than 3 days.    Problem list and histories reviewed & adjusted, as indicated.  Additional history: as documented    Patient Active Problem List   Diagnosis     Hyperlipidemia LDL goal <100     Type 2 diabetes, HbA1c goal < 7% (H)     Disturbance of skin sensation     Intermittent asthma     Hypertension goal BP (blood pressure) < 140/90     Dry eye syndrome     Nuclear sclerosis     Erectile dysfunction     Glaucoma suspect     Gout     Seasonal allergic rhinitis     Morbid obesity (H)     Retrograde  ejaculation     Bariatric surgery status     Chronic allergic rhinitis     Plantar warts     Past Surgical History:   Procedure Laterality Date     ENT SURGERY      tonsils removed at 19 years old     ESOPHAGOSCOPY, GASTROSCOPY, DUODENOSCOPY (EGD), COMBINED N/A 3/18/2015    Procedure: COMBINED ESOPHAGOSCOPY, GASTROSCOPY, DUODENOSCOPY (EGD);  Surgeon: Jae Robles MD;  Location:  GI     LAPAROSCOPIC BYPASS GASTRIC  6/22/2011    Procedure:LAPAROSCOPIC BYPASS GASTRIC; Surgeon:HANNAH SHEPARD; Location: OR       Social History   Substance Use Topics     Smoking status: Never Smoker     Smokeless tobacco: Never Used      Comment: 1994     Alcohol use 0.0 oz/week     0 Standard drinks or equivalent per week      Comment: Once a week.     Family History   Problem Relation Age of Onset     Cerebrovascular Disease Mother      Hypertension Mother      Diabetes Father      Hypertension Father      HEART DISEASE Brother      Breast Cancer Sister      Cancer Sister      Cerebrovascular Disease Brother      Breast Cancer Sister      Asthma Son      Thyroid Disease No family hx of      Glaucoma No family hx of      Macular Degeneration No family hx of            Reviewed and updated as needed this visit by clinical staff  Tobacco  Allergies  Meds  Problems  Med Hx  Surg Hx  Fam Hx  Soc Hx        Reviewed and updated as needed this visit by Provider  Allergies  Meds  Problems         ROS:  Constitutional, HEENT, cardiovascular, pulmonary, GI, , musculoskeletal, neuro, skin, endocrine and psych systems are negative, except as otherwise noted.    OBJECTIVE:     /88 (BP Location: Right arm, Patient Position: Chair, Cuff Size: Adult Large)  Pulse 61  Temp 98.8  F (37.1  C) (Oral)  Wt (!) 337 lb (152.9 kg)  SpO2 98%  BMI 44.46 kg/m2  Body mass index is 44.46 kg/(m^2).  GENERAL: healthy, alert and no distress  HENT: normal cephalic/atraumatic, ear canals and TM's normal, nasal mucosa edematous ,  oropharynx clear and oral mucous membranes moist  NECK: no adenopathy, no asymmetry, masses, or scars and thyroid normal to palpation  RESP: lungs clear to auscultation - no rales, rhonchi or wheezes  CV: regular rate and rhythm, normal S1 S2, no S3 or S4, no murmur, click or rub, no peripheral edema and peripheral pulses strong  ABDOMEN: soft, nontender, no hepatosplenomegaly, no masses and bowel sounds normal  MS: no gross musculoskeletal defects noted, no edema  PSYCH: mentation appears normal, affect normal/bright  Diabetic foot exam: normal DP and PT pulses, no trophic changes or ulcerative lesions, normal sensory exam and normal monofilament exam    Diagnostic Test Results:  none     ASSESSMENT/PLAN:     1. Acute pain of left shoulder  Likely OA or tendonitis - discussed further evaluation but patient refused for now.  Trial of medication for pain.  - diclofenac (VOLTAREN) 50 MG EC tablet; Take 1 tablet (50 mg) by mouth 3 times daily as needed for moderate pain  Dispense: 60 tablet; Refill: 1    2. Type 2 diabetes mellitus without complication, without long-term current use of insulin (H)  Previously controlled - recheck today.  - HEMOGLOBIN A1C  - Basic metabolic panel    3. Hypertension goal BP (blood pressure) < 140/90  Controlled - continue current treatment and check labs  - Basic metabolic panel  - amLODIPine (NORVASC) 10 MG tablet; Take 1 tablet (10 mg) by mouth daily  Dispense: 90 tablet; Refill: 1    4. Hyperlipidemia LDL goal <100  Continue current treatment    5. Chronic allergic rhinitis  Stop OTC afrin like spray and monitor - could consider other treatment if needed    6. Screening for HIV (human immunodeficiency virus)  screening  - HIV Screening    7. Screening for diabetic peripheral neuropathy  screening  - FOOT EXAM  NO CHARGE [50377.114]    8. Need for prophylactic vaccination and inoculation against influenza    - FLU VACCINE, (RIV4) RECOMBINANT HA  , IM (FluBlok, egg free) [47801]- >18  YRS (FMG recommended  50-64 YRS)  - Vaccine Administration, Initial [54066]    The uses and side effects, including black box warnings as appropriate, were discussed in detail.  All patient questions were answered.  The patient was instructed to call immediately if any side effects developed.     FUTURE APPOINTMENTS:       - Follow-up visit in 6 months or sooner as needed.    Mona Scales MD  [facility]

## 2018-10-03 NOTE — PROGRESS NOTES

## 2018-10-03 NOTE — MR AVS SNAPSHOT
After Visit Summary   10/3/2018    Geovany Marte    MRN: [id number]           Patient Information     Date Of Birth          1965        Visit Information        Provider Department      10/3/2018 10:40 AM Mona Lin MD [facility]        Today's Diagnoses     Acute pain of left shoulder    -  1    Type 2 diabetes mellitus without complication, without long-term current use of insulin (H)        Hypertension goal BP (blood pressure) < 140/90        Hyperlipidemia LDL goal <100        Chronic allergic rhinitis        Screening for HIV (human immunodeficiency virus)        Screening for diabetic peripheral neuropathy        Need for prophylactic vaccination and inoculation against influenza          Care Instructions    At [facility], we strive to deliver an exceptional experience to you, every time we see you.  If you receive a survey in the mail, please send us back your thoughts. We really do value your feedback.    Your care team:                            Family Medicine Internal Medicine   MD Gurpreet Howard MD Shantel Branch-Fleming, MD Katya Georgiev PA-C Megan Hill, APRN CNP    Lee Yin MD Pediatrics   GABBIE Goodman, MD Mouna Richardson APRN CNP   MD Marge Willson MD Deborah Mielke, MD Kim Thein, APRN [facility]      Clinic hours: Monday - Thursday 7 am-7 pm; Fridays 7 am-5 pm.   Urgent care: Monday - Friday 11 am-9 pm; Saturday and Sunday 9 am-5 pm.  Pharmacy : Monday -Thursday 8 am-8 pm; Friday 8 am-6 pm; Saturday and Sunday 9 am-5 pm.     Clinic: [phone]   Pharmacy: [phone]               Follow-ups after your visit        Follow-up notes from your care team     Return in about 6 months (around 4/3/2019).      Your next 10 appointments already scheduled     Oct 29, 2018 11:40 AM CDT   New Visit with Yoseph Ortiz OD   [facility]  [place] [facility]    [address] [place] [id number]   [phone]              Who to contact     If you have questions or need follow up information about today's clinic visit or your schedule please contact [facility] directly at [phone].  Normal or non-critical lab and imaging results will be communicated to you by MyChart, letter or phone within 4 business days after the clinic has received the results. If you do not hear from us within 7 days, please contact the clinic through [employer] or phone. If you have a critical or abnormal lab result, we will notify you by phone as soon as possible.  Submit refill requests through [employer] or call your pharmacy and they will forward the refill request to us. Please allow 3 business days for your refill to be completed.          Additional Information About Your Visit        [employer]book Information     [employer] gives you secure access to your electronic health record. If you see a primary care provider, you can also send messages to your care team and make appointments. If you have questions, please call your primary care clinic.  If you do not have a primary care provider, please call [phone] and they will assist you.        Care EveryWhere ID     This is your Care EveryWhere ID. This could be used by other organizations to access your [place] medical records  [id number]        Your Vitals Were     Pulse Temperature Pulse Oximetry BMI (Body Mass Index)          61 98.8  F (37.1  C) (Oral) 98% 44.46 kg/m2         Blood Pressure from Last 3 Encounters:   10/03/18 138/88   03/07/18 130/80   09/25/17 134/70    Weight from Last 3 Encounters:   10/03/18 (!) 337 lb (152.9 kg)   03/07/18 (!) 330 lb 9.6 oz (150 kg)   09/25/17 (!) 334 lb (151.5 kg)              We Performed the Following     Basic metabolic panel     FLU VACCINE, (RIV4) RECOMBINANT HA  , IM (FluBlok, egg free) [66479]- >18 YRS ([facility]  recommended  50-64 YRS)     FOOT EXAM  NO CHARGE [08154.114]     HEMOGLOBIN A1C     HIV Screening     Vaccine Administration, Initial [54737]          Today's Medication Changes          These changes are accurate as of 10/3/18 11:53 AM.  If you have any questions, ask your nurse or doctor.               Start taking these medicines.        Dose/Directions    diclofenac 50 MG EC tablet   Commonly known as:  VOLTAREN   Used for:  Acute pain of left shoulder   Started by:  Mona Lin MD        Dose:  50 mg   Take 1 tablet (50 mg) by mouth 3 times daily as needed for moderate pain   Quantity:  60 tablet   Refills:  1            Where to get your medicines      These medications were sent to [facility]/pharmacy #[id number] - [facility], [place] - [address]  [address], [place] [address]     Phone:  [phone]     amLODIPine 10 MG tablet    diclofenac 50 MG EC tablet                Primary Care Provider Office Phone # Fax #    Mona Scales [phone] [address]  [place] [place] [address]        Equal Access to Services     [facility]: Hadii aad ku hadasho Soomaali, waaxda luqadaha, qaybta kaalmada adeegyada, omega guajardo . So [facility] [phone].    ATENCIÓN: Si habla español, tiene a cortes disposición servicios gratsouleymaneos de asistencia lingüística. Joseph al [phone].    We comply with applicable federal civil rights laws and [place] laws. We do not discriminate on the basis of race, color, national origin, age, disability, sex, sexual orientation, or gender identity.            Thank you!     Thank you for choosing [facility]  for your care. Our goal is always to provide you with excellent care. Hearing back from our patients is one way we can continue to improve our services. Please take a few minutes to complete the written survey that you may receive in the mail after your visit with us.  Thank you!             Your Updated Medication List - Protect others around you: Learn how to safely use, store and throw away your medicines at www.disposemymeds.org.          This list is accurate as of 10/3/18 11:53 AM.  Always use your most recent med list.                   Brand Name Dispense Instructions for use Diagnosis    ACE/ARB/ARNI NOT PRESCRIBED (INTENTIONAL)      continuous prn for other Reported on 4/3/2017    Type 2 diabetes mellitus without complication (H)       albuterol 108 (90 Base) MCG/ACT inhaler    PROAIR HFA    1 Inhaler    Inhale 2 puffs into the lungs every 4 hours as needed (for asthma symptoms)    Intermittent asthma, uncomplicated       amLODIPine 10 MG tablet    NORVASC    90 tablet    Take 1 tablet (10 mg) by mouth daily    Hypertension goal BP (blood pressure) < 140/90       aspirin 81 MG tablet      1 TABLET DAILY (*)        blood glucose monitoring test strip    ONETOUCH ULTRA    1 Box    by In Vitro route 2 times daily.    Type 2 diabetes, HbA1c goal < 7% (H)       carvedilol 25 MG tablet    COREG    180 tablet    Take 1 tablet (25 mg) by mouth 2 times daily (with meals)    Hypertension goal BP (blood pressure) < 140/90       CITRACAL CALCIUM+D PO      3 times daily. Take one tablet twice daily.        diclofenac 50 MG EC tablet    VOLTAREN    60 tablet    Take 1 tablet (50 mg) by mouth 3 times daily as needed for moderate pain    Acute pain of left shoulder       fluticasone 50 MCG/ACT spray    FLONASE    1 Package    Spray 1-2 sprays into both nostrils daily    Seasonal allergic rhinitis       order for DME     1 kit    One touch glucometer with supplies to replace old meter for testing twice daily    Type 2 diabetes, HbA1c goal < 7% (H)       order for DME     12 each    Injection Supplies for Vitamin B12: 3cc syringes w/ 27 gauge needles, 1/2 inch length    Achlorhydria, gastric       pravastatin 80 MG tablet    PRAVACHOL    90 tablet    Take 1 tablet (80 mg) by mouth every  evening    Hyperlipidemia LDL goal <100

## 2018-10-04 LAB — HIV 1+2 AB+HIV1 P24 AG SERPL QL IA: NONREACTIVE

## 2018-10-04 NOTE — PROGRESS NOTES
Mr. Marte,    Your HIV test is normal.  Your kidney function is good.  Your diabetes has worsened but is still at goal. Please decrease your bread, rice, pasta, potatoes and sugar intake.      Follow up in 6 months for a recheck.    Please contact the clinic if you have additional questions.  Thank you.    Sincerely,    Mona Scales

## 2018-10-29 ENCOUNTER — OFFICE VISIT (OUTPATIENT)
Dept: OPTOMETRY | Facility: CLINIC | Age: 53
End: 2018-10-29
Payer: COMMERCIAL

## 2018-10-29 DIAGNOSIS — E11.9 TYPE 2 DIABETES MELLITUS WITHOUT COMPLICATION, WITHOUT LONG-TERM CURRENT USE OF INSULIN (H): Primary | ICD-10-CM

## 2018-10-29 DIAGNOSIS — H10.13 ALLERGIC CONJUNCTIVITIS OF BOTH EYES: ICD-10-CM

## 2018-10-29 DIAGNOSIS — H40.003 GLAUCOMA SUSPECT OF BOTH EYES: ICD-10-CM

## 2018-10-29 DIAGNOSIS — H25.13 NUCLEAR SCLEROSIS OF BOTH EYES: ICD-10-CM

## 2018-10-29 DIAGNOSIS — H52.03 HYPERMETROPIA OF BOTH EYES: ICD-10-CM

## 2018-10-29 DIAGNOSIS — H52.4 PRESBYOPIA: ICD-10-CM

## 2018-10-29 PROCEDURE — 92015 DETERMINE REFRACTIVE STATE: CPT | Performed by: OPTOMETRIST

## 2018-10-29 PROCEDURE — 92014 COMPRE OPH EXAM EST PT 1/>: CPT | Performed by: OPTOMETRIST

## 2018-10-29 RX ORDER — AZELASTINE HYDROCHLORIDE 0.5 MG/ML
1 SOLUTION/ DROPS OPHTHALMIC 2 TIMES DAILY
Qty: 1 BOTTLE | Refills: 11 | Status: SHIPPED | OUTPATIENT
Start: 2018-10-29 | End: 2020-07-22

## 2018-10-29 ASSESSMENT — CONF VISUAL FIELD
OD_NORMAL: 1
OS_NORMAL: 1

## 2018-10-29 ASSESSMENT — REFRACTION_WEARINGRX
OS_SPHERE: +2.75
OD_CYLINDER: SPHERE
OD_SPHERE: +2.75
OD_ADD: +2.25
SPECS_TYPE: OTC READER
OS_CYLINDER: +0.25
OS_AXIS: 050
OS_CYLINDER: SPHERE
OD_SPHERE: +1.50
OS_ADD: +2.25
OD_CYLINDER: SPHERE
OS_SPHERE: +0.75

## 2018-10-29 ASSESSMENT — VISUAL ACUITY
METHOD: SNELLEN - LINEAR
OD_CC: 20/30
OS_CC: 20/20
CORRECTION_TYPE: GLASSES
OS_SC: 20/30
OD_SC: 20/40
OD_SC+: -1
OS_SC+: -2

## 2018-10-29 ASSESSMENT — EXTERNAL EXAM - LEFT EYE: OS_EXAM: NORMAL

## 2018-10-29 ASSESSMENT — REFRACTION_MANIFEST
OS_SPHERE: +0.75
OD_ADD: +2.25
OS_ADD: +2.25
OD_SPHERE: +1.50
OS_AXIS: 045
OS_CYLINDER: +0.25
OD_CYLINDER: SPHERE

## 2018-10-29 ASSESSMENT — TONOMETRY
OS_IOP_MMHG: 17
OD_IOP_MMHG: 18
IOP_METHOD: TONOPEN

## 2018-10-29 ASSESSMENT — SLIT LAMP EXAM - LIDS
COMMENTS: NORMAL
COMMENTS: NORMAL

## 2018-10-29 ASSESSMENT — EXTERNAL EXAM - RIGHT EYE: OD_EXAM: NORMAL

## 2018-10-29 NOTE — PROGRESS NOTES
Chief Complaint   Patient presents with     Diabetic Eye Exam        Lab Results   Component Value Date    A1C 6.6 10/03/2018    A1C 6.2 03/07/2018    A1C 6.2 09/25/2017    A1C 6.0 03/23/2017    A1C 5.7 10/03/2016       Last Eye Exam: 9-  Dilated Previously: Yes    What are you currently using to see? otc readers    Distance Vision Acuity: Satisfied with vision    Near Vision Acuity: Satisfied with vision while reading  With otc readers    Eye Comfort: dry with allergies  Do you use eye drops? : Yes: systane  Occupation or Hobbies: sales rep  Saw Dr. Louie last year for glaucoma eval- was normal- she recommended follow up OCT/VF in 2 years.    Birgit Vargas Optometric Assistant, A.B.O.C.     Medical, surgical and family histories reviewed and updated 10/29/2018.       OBJECTIVE: See Ophthalmology exam    ASSESSMENT:    ICD-10-CM    1. Type 2 diabetes mellitus without complication, without long-term current use of insulin (H) E11.9 EYE EXAM (SIMPLE-NONBILLABLE)    Negative diabetic retinopathy   2. Allergic conjunctivitis of both eyes H10.13 EYE EXAM (SIMPLE-NONBILLABLE)     azelastine (OPTIVAR) 0.05 % ophthalmic solution   3. Nuclear sclerosis of both eyes H25.13 EYE EXAM (SIMPLE-NONBILLABLE)   4. Glaucoma suspect of both eyes H40.003 EYE EXAM (SIMPLE-NONBILLABLE)   5. Hypermetropia of both eyes H52.03 REFRACTION   6. Presbyopia H52.4 REFRACTION      PLAN:    Geovany Marte aware  eye exam results will be sent to Mona Lin.  Patient Instructions   There are not any signs of the diabetes affecting the eyes today.  It is important that you get your eyes dilated once yearly and keep good control of your diabetes.    Diabetes weakens the blood vessels all over the body, including the eyes. Damage to the blood vessels in the eyes can cause swelling or bleeding into part of the eye (called the retina). This is called diabetic retinopathy (OLGA-tin-AH-puh-thee). If not treated, this disease can  cause vision loss or blindness.   Symptoms may include blurred or distorted vision, but many people have no symptoms. It's important to see your eye doctor regularly to check for problems.   Early treatment and good control can help protect your vision. Here are the things you can do to help prevent vision loss:      1. Keep your blood sugar levels under tight control.      2. Bring high blood pressure under control.      3. No smoking.      4. Have yearly dilated eye exams.    Optivar- 1 drop both eyes 2 x day as needed for itchy eyes.    You have the start of mild cataracts.  You may notice some blurred vision or glare with night driving.  It is important that you wear good sunglasses to protect your eyes from the ultraviolet light from the sun.  Taking a supplement with at least 300 mg/day of vitamin C appears to help prevent cataract development.  I recommend that you return in 1 year for an eye exam unless there are any sudden changes in your vision.       You are a glaucoma suspect.  We will continue to monitor your intraocular pressures and optic nerves.  OCT/VF next year per Dr. Louie.    Return in 1 year for a complete eye exam or sooner if needed.    Yoseph Ortiz, OD

## 2018-10-29 NOTE — PATIENT INSTRUCTIONS
There are not any signs of the diabetes affecting the eyes today.  It is important that you get your eyes dilated once yearly and keep good control of your diabetes.    Diabetes weakens the blood vessels all over the body, including the eyes. Damage to the blood vessels in the eyes can cause swelling or bleeding into part of the eye (called the retina). This is called diabetic retinopathy (OLGA-tin-AH-puh-thee). If not treated, this disease can cause vision loss or blindness.   Symptoms may include blurred or distorted vision, but many people have no symptoms. It's important to see your eye doctor regularly to check for problems.   Early treatment and good control can help protect your vision. Here are the things you can do to help prevent vision loss:      1. Keep your blood sugar levels under tight control.      2. Bring high blood pressure under control.      3. No smoking.      4. Have yearly dilated eye exams.    Optivar- 1 drop both eyes 2 x day as needed for itchy eyes.    You have the start of mild cataracts.  You may notice some blurred vision or glare with night driving.  It is important that you wear good sunglasses to protect your eyes from the ultraviolet light from the sun.  Taking a supplement with at least 300 mg/day of vitamin C appears to help prevent cataract development.  I recommend that you return in 1 year for an eye exam unless there are any sudden changes in your vision.       You are a glaucoma suspect.  We will continue to monitor your intraocular pressures and optic nerves.  OCT/VF next year per Dr. Louie.    Return in 1 year for a complete eye exam or sooner if needed.    Yoseph Ortiz, OD    The affects of the dilating drops last for 4- 6 hours.  You will be more sensitive to light and vision will be blurry up close.  Mydriatic sunglasses were given if needed.      Optometry Providers       Clinic Locations                                 Telephone Number   Dr. Mellisa Farmer  [facility] [place]   Rob [phone]     [facility] [facility] Hours:                [place] [facility] Hours:       [place] [facility] Hours:   [address]   [address]     [address] [place] [address]   [place] [address]    [place] [address]  Phone: [phone]                    Phone: [phone]     Phone: [phone]                      Monday 8:00-7:00                          Monday 8:00-7:00                          Monday 8:00-7:00              Tuesday 8:00-6:00                          Tuesday 8:00-7:00                          Tuesday 8:00-7:00              Wednesday 8:00-6:00                  Wednesday 8:00-7:00                   Wednesday 8:00-7:00      Thursday 8:00-6:00                        Thursday 8:00-7:00                         Thursday 8:00-7:00            Friday 8:00-5:00                              Friday 8:00-5:00                              Friday 8:00-5:00    [facility] Hours:   [address] [place], [place] [id number] [phone]    Monday 8:00-7:00  Tuesday 8:00-7:00  Wednesday 8:00-7:00  Thursday 8:00-7:00  Friday 8:00-5:00  Please log on to [email or web address] to order your contact lenses.  The link is found on the [facility] page.  As always, Thank you for trusting us with your health care needs!

## 2018-10-29 NOTE — MR AVS SNAPSHOT
After Visit Summary   10/29/2018    Geovany Marte    MRN: [id number]           Patient Information     Date Of Birth          1965        Visit Information        Provider Department      10/29/2018 11:40 AM Yoseph Ortiz OD [facility]        Today's Diagnoses     Type 2 diabetes mellitus without complication, without long-term current use of insulin (H)    -  1    Allergic conjunctivitis of both eyes        Nuclear sclerosis of both eyes        Glaucoma suspect of both eyes        Hypermetropia of both eyes        Presbyopia          Care Instructions    There are not any signs of the diabetes affecting the eyes today.  It is important that you get your eyes dilated once yearly and keep good control of your diabetes.    Diabetes weakens the blood vessels all over the body, including the eyes. Damage to the blood vessels in the eyes can cause swelling or bleeding into part of the eye (called the retina). This is called diabetic retinopathy (OLGA-tin-AH-puh-thee). If not treated, this disease can cause vision loss or blindness.   Symptoms may include blurred or distorted vision, but many people have no symptoms. It's important to see your eye doctor regularly to check for problems.   Early treatment and good control can help protect your vision. Here are the things you can do to help prevent vision loss:      1. Keep your blood sugar levels under tight control.      2. Bring high blood pressure under control.      3. No smoking.      4. Have yearly dilated eye exams.    Optivar- 1 drop both eyes 2 x day as needed for itchy eyes.    You have the start of mild cataracts.  You may notice some blurred vision or glare with night driving.  It is important that you wear good sunglasses to protect your eyes from the ultraviolet light from the sun.  Taking a supplement with at least 300 mg/day of vitamin C appears to help prevent cataract development.  I recommend that you return in 1  year for an eye exam unless there are any sudden changes in your vision.       You are a glaucoma suspect.  We will continue to monitor your intraocular pressures and optic nerves.  OCT/VF next year per Dr. Louie.    Return in 1 year for a complete eye exam or sooner if needed.    Yoseph Ortiz, SHIRA    The affects of the dilating drops last for 4- 6 hours.  You will be more sensitive to light and vision will be blurry up close.  Mydriatic sunglasses were given if needed.      Optometry Providers       Clinic Locations                                 Telephone Number   [facility] [place]   [place] [phone]     [facility] Hours:                [facility] Hours:       [facility] Hours:   [address]   [address] [facility]     [address] [place] [address]   ARABELLA Smith [address]    ARABELLA Cui [address]  Phone: [phone]                    Phone: [phone]     Phone: [phone]                      Monday 8:00-7:00                          Monday 8:00-7:00                          Monday 8:00-7:00              Tuesday 8:00-6:00                          Tuesday 8:00-7:00                          Tuesday 8:00-7:00              Wednesday 8:00-6:00                  Wednesday 8:00-7:00                   Wednesday 8:00-7:00      Thursday 8:00-6:00                        Thursday 8:00-7:00                         Thursday 8:00-7:00            Friday 8:00-5:00                              Friday 8:00-5:00                              Friday 8:00-5:00    [facility] Hours:   [address] [place] [address]  [phone]    Monday 8:00-7:00  Tuesday 8:00-7:00  Wednesday 8:00-7:00  Thursday 8:00-7:00  Friday 8:00-5:00  Please log on to [email or web address] to order your contact lenses.  The link is found on the [facility]  page.  As always, Thank you for trusting us with your health care needs!              Follow-ups after your visit        Follow-up notes from your care team     Return in about 1 year (around 10/29/2019) for Annual Visit.      Who to contact     If you have questions or need follow up information about today's clinic visit or your schedule please contact [facility] directly at [phone].  Normal or non-critical lab and imaging results will be communicated to you by "[employer]hart, letter or phone within 4 business days after the clinic has received the results. If you do not hear from us within 7 days, please contact the clinic through [employer] or phone. If you have a critical or abnormal lab result, we will notify you by phone as soon as possible.  Submit refill requests through [employer] or call your pharmacy and they will forward the refill request to us. Please allow 3 business days for your refill to be completed.          Additional Information About Your Visit        "[employer]hart Information     [employer] gives you secure access to your electronic health record. If you see a primary care provider, you can also send messages to your care team and make appointments. If you have questions, please call your primary care clinic.  If you do not have a primary care provider, please call [phone] and they will assist you.        Care EveryWhere ID     This is your Care EveryWhere ID. This could be used by other organizations to access your [facility] medical records  [id number]         Blood Pressure from Last 3 Encounters:   10/03/18 138/88   03/07/18 130/80   09/25/17 134/70    Weight from Last 3 Encounters:   10/03/18 (!) 152.9 kg (337 lb)   03/07/18 (!) 150 kg (330 lb 9.6 oz)   09/25/17 (!) 151.5 kg (334 lb)              We Performed the Following     EYE EXAM (SIMPLE-NONBILLABLE)     REFRACTION          Today's Medication Changes          These changes are accurate as of 10/29/18 12:33 PM.  If you have  any questions, ask your nurse or doctor.               Start taking these medicines.        Dose/Directions    azelastine 0.05 % ophthalmic solution   Commonly known as:  OPTIVAR   Used for:  Allergic conjunctivitis of both eyes   Started by:  Yoseph Ortiz, OD        Dose:  1 drop   Apply 1 drop to eye 2 times daily   Quantity:  1 Bottle   Refills:  11            Where to get your medicines      These medications were sent to [facility]/pharmacy #[id number] - [facility], [place] - [address]  [address], [facility] [address]     Phone:  [phone]     azelastine 0.05 % ophthalmic solution                Primary Care Provider Office Phone # Fax #    Mona Aiyana Scales [phone] [address]  [facility] [address]        Equal Access to Services     [facility]: Hadii aad ku hadasho Soomaali, waaxda luqadaha, qaybta kaalmada adeegyada, waxay addisin hayromana guajardo . So [facility] [phone].    ATENCIÓN: Si habla español, tiene a cortes disposición servicios gratuitos de asistencia lingüística. [facility] [phone].    We comply with applicable federal civil rights laws and [place] laws. We do not discriminate on the basis of race, color, national origin, age, disability, sex, sexual orientation, or gender identity.            Thank you!     Thank you for choosing [facility]  for your care. Our goal is always to provide you with excellent care. Hearing back from our patients is one way we can continue to improve our services. Please take a few minutes to complete the written survey that you may receive in the mail after your visit with us. Thank you!             Your Updated Medication List - Protect others around you: Learn how to safely use, store and throw away your medicines at www.disposemymeds.org.          This list is accurate as of 10/29/18 12:33 PM.  Always use your most recent med list.                   Brand Name Dispense  Instructions for use Diagnosis    ACE/ARB/ARNI NOT PRESCRIBED (INTENTIONAL)      continuous prn for other Reported on 4/3/2017    Type 2 diabetes mellitus without complication (H)       albuterol 108 (90 Base) MCG/ACT inhaler    PROAIR HFA    1 Inhaler    Inhale 2 puffs into the lungs every 4 hours as needed (for asthma symptoms)    Intermittent asthma, uncomplicated       amLODIPine 10 MG tablet    NORVASC    90 tablet    Take 1 tablet (10 mg) by mouth daily    Hypertension goal BP (blood pressure) < 140/90       aspirin 81 MG tablet      1 TABLET DAILY (*)        azelastine 0.05 % ophthalmic solution    OPTIVAR    1 Bottle    Apply 1 drop to eye 2 times daily    Allergic conjunctivitis of both eyes       blood glucose monitoring test strip    ONETOUCH ULTRA    1 Box    by In Vitro route 2 times daily.    Type 2 diabetes, HbA1c goal < 7% (H)       carvedilol 25 MG tablet    COREG    180 tablet    Take 1 tablet (25 mg) by mouth 2 times daily (with meals)    Hypertension goal BP (blood pressure) < 140/90       CITRACAL CALCIUM+D PO      3 times daily. Take one tablet twice daily.        diclofenac 50 MG EC tablet    VOLTAREN    60 tablet    Take 1 tablet (50 mg) by mouth 3 times daily as needed for moderate pain    Acute pain of left shoulder       fluticasone 50 MCG/ACT spray    FLONASE    1 Package    Spray 1-2 sprays into both nostrils daily    Seasonal allergic rhinitis       order for DME     1 kit    One touch glucometer with supplies to replace old meter for testing twice daily    Type 2 diabetes, HbA1c goal < 7% (H)       order for DME     12 each    Injection Supplies for Vitamin B12: 3cc syringes w/ 27 gauge needles, 1/2 inch length    Achlorhydria, gastric       pravastatin 80 MG tablet    PRAVACHOL    90 tablet    Take 1 tablet (80 mg) by mouth every evening    Hyperlipidemia LDL goal <100

## 2018-10-29 NOTE — LETTER
10/29/2018         RE: Geovany Marte  [address]  [place] [address]        Dear Colleague,    Thank you for referring your patient, Geovany Marte, to the [facility]. Please see a copy of my visit note below.    Chief Complaint   Patient presents with     Diabetic Eye Exam        Lab Results   Component Value Date    A1C 6.6 10/03/2018    A1C 6.2 03/07/2018    A1C 6.2 09/25/2017    A1C 6.0 03/23/2017    A1C 5.7 10/03/2016       Last Eye Exam: 9-  Dilated Previously: Yes    What are you currently using to see? otc readers    Distance Vision Acuity: Satisfied with vision    Near Vision Acuity: Satisfied with vision while reading  With otc readers    Eye Comfort: dry with allergies  Do you use eye drops? : Yes: systane  Occupation or Hobbies: sales rep  Saw Dr. Louie last year for glaucoma eval- was normal- she recommended follow up OCT/VF in 2 years.    Birgit Vargas Optometric Assistant, A.B.O.C.     Medical, surgical and family histories reviewed and updated 10/29/2018.       OBJECTIVE: See Ophthalmology exam    ASSESSMENT:    ICD-10-CM    1. Type 2 diabetes mellitus without complication, without long-term current use of insulin (H) E11.9 EYE EXAM (SIMPLE-NONBILLABLE)    Negative diabetic retinopathy   2. Allergic conjunctivitis of both eyes H10.13 EYE EXAM (SIMPLE-NONBILLABLE)     azelastine (OPTIVAR) 0.05 % ophthalmic solution   3. Nuclear sclerosis of both eyes H25.13 EYE EXAM (SIMPLE-NONBILLABLE)   4. Glaucoma suspect of both eyes H40.003 EYE EXAM (SIMPLE-NONBILLABLE)   5. Hypermetropia of both eyes H52.03 REFRACTION   6. Presbyopia H52.4 REFRACTION      PLAN:    Geovany Marte aware  eye exam results will be sent to Mona Lin.  Patient Instructions   There are not any signs of the diabetes affecting the eyes today.  It is important that you get your eyes dilated once yearly and keep good control of your diabetes.    Diabetes weakens the blood vessels  all over the body, including the eyes. Damage to the blood vessels in the eyes can cause swelling or bleeding into part of the eye (called the retina). This is called diabetic retinopathy (OLGA-tin-AH-puh-thee). If not treated, this disease can cause vision loss or blindness.   Symptoms may include blurred or distorted vision, but many people have no symptoms. It's important to see your eye doctor regularly to check for problems.   Early treatment and good control can help protect your vision. Here are the things you can do to help prevent vision loss:      1. Keep your blood sugar levels under tight control.      2. Bring high blood pressure under control.      3. No smoking.      4. Have yearly dilated eye exams.    Optivar- 1 drop both eyes 2 x day as needed for itchy eyes.    You have the start of mild cataracts.  You may notice some blurred vision or glare with night driving.  It is important that you wear good sunglasses to protect your eyes from the ultraviolet light from the sun.  Taking a supplement with at least 300 mg/day of vitamin C appears to help prevent cataract development.  I recommend that you return in 1 year for an eye exam unless there are any sudden changes in your vision.       You are a glaucoma suspect.  We will continue to monitor your intraocular pressures and optic nerves.  OCT/VF next year per Dr. Louie.    Return in 1 year for a complete eye exam or sooner if needed.    Yoseph Ortiz, OD             Again, thank you for allowing me to participate in the care of your patient.        Sincerely,        Yoseph Ortiz, OD

## 2018-10-31 ENCOUNTER — OFFICE VISIT (OUTPATIENT)
Dept: FAMILY MEDICINE | Facility: CLINIC | Age: 53
End: 2018-10-31
Payer: COMMERCIAL

## 2018-10-31 ENCOUNTER — RADIANT APPOINTMENT (OUTPATIENT)
Dept: GENERAL RADIOLOGY | Facility: CLINIC | Age: 53
End: 2018-10-31
Attending: FAMILY MEDICINE
Payer: COMMERCIAL

## 2018-10-31 VITALS
TEMPERATURE: 97.9 F | WEIGHT: 315 LBS | HEART RATE: 59 BPM | DIASTOLIC BLOOD PRESSURE: 82 MMHG | OXYGEN SATURATION: 100 % | BODY MASS INDEX: 41.75 KG/M2 | SYSTOLIC BLOOD PRESSURE: 134 MMHG | HEIGHT: 73 IN

## 2018-10-31 DIAGNOSIS — J30.2 SEASONAL ALLERGIC RHINITIS, UNSPECIFIED TRIGGER: ICD-10-CM

## 2018-10-31 DIAGNOSIS — M25.512 ACUTE PAIN OF LEFT SHOULDER: ICD-10-CM

## 2018-10-31 DIAGNOSIS — B07.8 COMMON WART: ICD-10-CM

## 2018-10-31 DIAGNOSIS — M25.512 ACUTE PAIN OF LEFT SHOULDER: Primary | ICD-10-CM

## 2018-10-31 PROCEDURE — 17110 DESTRUCTION B9 LES UP TO 14: CPT | Performed by: FAMILY MEDICINE

## 2018-10-31 PROCEDURE — 99214 OFFICE O/P EST MOD 30 MIN: CPT | Mod: 25 | Performed by: FAMILY MEDICINE

## 2018-10-31 PROCEDURE — 73030 X-RAY EXAM OF SHOULDER: CPT | Mod: LT

## 2018-10-31 RX ORDER — FLUTICASONE PROPIONATE 50 MCG
1-2 SPRAY, SUSPENSION (ML) NASAL DAILY
Qty: 16 G | Refills: 11 | Status: SHIPPED | OUTPATIENT
Start: 2018-10-31 | End: 2019-10-30

## 2018-10-31 NOTE — PROGRESS NOTES
SUBJECTIVE:   Geovany Marte is a 52 year old male who presents to clinic today for the following health issues:      Joint Pain    Onset: 2 months    Description:   Location: left shoulder  Character: Sharp and aching- only at night    Intensity: moderate    Progression of Symptoms: same    Accompanying Signs & Symptoms:  Other symptoms: numbness and tingling    History:   Previous similar pain: no       Precipitating factors:   Trauma or overuse: no     Alleviating factors:  Improved by: nothing    Therapies Tried and outcome: Diclofenac Sodium- not working    2 days ago had nasal congestion and shortness of breath.  Took wife's nebulizer and was better.  No smoke or fume exposure. Has been using generic Afrin for two days then taking two days off over the last few months.     Wart x 2 and left index finger for months.  Failed compound W.    Problem list and histories reviewed & adjusted, as indicated.  Additional history: as documented    Patient Active Problem List   Diagnosis     Hyperlipidemia LDL goal <100     Type 2 diabetes, HbA1c goal < 7% (H)     Disturbance of skin sensation     Intermittent asthma     Hypertension goal BP (blood pressure) < 140/90     Dry eye syndrome     Nuclear sclerosis     Erectile dysfunction     Glaucoma suspect     Gout     Seasonal allergic rhinitis     Morbid obesity (H)     Retrograde ejaculation     Bariatric surgery status     Chronic allergic rhinitis     Plantar warts     Past Surgical History:   Procedure Laterality Date     ENT SURGERY      tonsils removed at 19 years old     ESOPHAGOSCOPY, GASTROSCOPY, DUODENOSCOPY (EGD), COMBINED N/A 3/18/2015    Procedure: COMBINED ESOPHAGOSCOPY, GASTROSCOPY, DUODENOSCOPY (EGD);  Surgeon: Jae Robles MD;  Location:  GI     LAPAROSCOPIC BYPASS GASTRIC  6/22/2011    Procedure:LAPAROSCOPIC BYPASS GASTRIC; Surgeon:HANNAH SHEPARD; Location: OR       Social History   Substance Use Topics     Smoking status: Never Smoker      "Smokeless tobacco: Never Used      Comment: 1994     Alcohol use 0.0 oz/week     0 Standard drinks or equivalent per week      Comment: Once a week.     Family History   Problem Relation Age of Onset     Cerebrovascular Disease Mother      Hypertension Mother      Diabetes Father      Hypertension Father      HEART DISEASE Brother      Breast Cancer Sister      Cancer Sister      Cerebrovascular Disease Brother      Breast Cancer Sister      Asthma Son      Thyroid Disease No family hx of      Glaucoma No family hx of      Macular Degeneration No family hx of            Reviewed and updated as needed this visit by clinical staff  Tobacco  Allergies  Meds  Problems       Reviewed and updated as needed this visit by Provider  Allergies  Meds  Problems         ROS:  Constitutional, HEENT, cardiovascular, pulmonary, GI, , musculoskeletal, neuro, skin, endocrine and psych systems are negative, except as otherwise noted.    OBJECTIVE:     /82  Pulse 59  Temp 97.9  F (36.6  C) (Oral)  Ht 6' 1\" (1.854 m)  Wt (!) 337 lb 6.4 oz (153 kg)  SpO2 100%  BMI 44.51 kg/m2  Body mass index is 44.51 kg/(m^2).  GENERAL: healthy, alert and no distress  HENT: normal cephalic/atraumatic, ear canals and TM's normal, nasal mucosa edematous , oropharynx clear and oral mucous membranes moist  NECK: no adenopathy, no asymmetry, masses, or scars and thyroid normal to palpation  RESP: lungs clear to auscultation - no rales, rhonchi or wheezes  CV: regular rate and rhythm, normal S1 S2, no S3 or S4, no murmur, click or rub, no peripheral edema and peripheral pulses strong  ABDOMEN: soft, nontender, no hepatosplenomegaly, no masses and bowel sounds normal  MS: LEFT SHOULDER: pain of AC and deltoid insertion with rotational ROM.  SKIN: wart x 2 left index finger    Diagnostic Test Results:  none     ASSESSMENT/PLAN:     1. Acute pain of left shoulder  Suspect AC arthritis or tendonitis - check xray and consider MRI or PT.  - XR " Shoulder Left 2 Views; Future    2. Common wart  Liquid nitrogen was applied for 10-12 seconds to the skin lesion and the expected blistering or scabbing reaction explained. Do not pick at the area. Patient reminded to expect hypopigmented scars from the procedure. Return if lesion fails to fully resolve.   - DESTRUCT BENIGN LESION, UP TO 14    3. Seasonal allergic rhinitis, unspecified trigger  Start flonase instead of Afrin.  - fluticasone (FLONASE) 50 MCG/ACT spray; Spray 1-2 sprays into both nostrils daily  Dispense: 16 g; Refill: 11    The uses and side effects, including black box warnings as appropriate, were discussed in detail.  All patient questions were answered.  The patient was instructed to call immediately if any side effects developed.     FUTURE APPOINTMENTS:       - Follow-up visit in 2 - 6 weeks if not improved.    Mona Scales MD  [facility]

## 2018-10-31 NOTE — MR AVS SNAPSHOT
After Visit Summary   10/31/2018    Geovany Marte    MRN: [id number]           Patient Information     Date Of Birth          1965        Visit Information        Provider Department      10/31/2018 9:00 AM Mona Lin MD [facility]        Today's Diagnoses     Acute pain of left shoulder    -  1    Common wart        Seasonal allergic rhinitis, unspecified trigger          Care Instructions    At [facility], we strive to deliver an exceptional experience to you, every time we see you.  If you receive a survey in the mail, please send us back your thoughts. We really do value your feedback.    Your care team:                            Family Medicine Internal Medicine   MD Gurpreet Howard MD Shantel Branch-Fleming, MD Katya Georgiev PA-C Megan Hill, APRRAH Yin MD Pediatrics   Dmitry Chavez, GABBIE Portillo, MD Mouna Richardson APRN CNP   MD Marge Willson MD Deborah Mielke, MD Kim Thein, [facility]      Clinic hours: Monday - Thursday 7 am-7 pm; Fridays 7 am-5 pm.   Urgent care: Monday - Friday 11 am-9 pm; Saturday and Sunday 9 am-5 pm.  Pharmacy : Monday -Thursday 8 am-8 pm; Friday 8 am-6 pm; Saturday and Sunday 9 am-5 pm.     Clinic: [phone]   Pharmacy: [phone]                Follow-ups after your visit        Follow-up notes from your care team     Return in about 5 months (around 3/31/2019) for diabetes.      Your next 10 appointments already scheduled     Oct 31, 2018  9:50 AM CDT   XR SHOULDER LEFT 2 VIEWS with [id number]   [facility]    [address] [place] [id number] [phone]           How do I prepare for my exam? (Food and drink instructions) No Food and Drink Restrictions.  How do I prepare for my exam? (Other instructions) You do not need to do anything special for  this exam.  What should I wear: Wear comfortable clothes.  How long does the exam take: Most scans take less than 5 minutes.  What should I bring: Bring a list of your medicines, including vitamins, minerals and over-the-counter drugs. It is safest to leave personal items at home.  Do I need a :  No  is needed.  What do I need to tell my doctor: Tell your doctor if there s any chance you are pregnant.  What should I do after the exam: No restrictions, You may resume normal activities.  What is this test: An image of a specific body part shown in shades of black and white.  Who should I call with questions: If you have any questions, please call the Imaging Department where you will have your exam. Directions, parking instructions, and other information is available on our website, [email or web address].              Who to contact     If you have questions or need follow up information about today's clinic visit or your schedule please contact [facility] directly at [phone].  Normal or non-critical lab and imaging results will be communicated to you by [employer], letter or phone within 4 business days after the clinic has received the results. If you do not hear from us within 7 days, please contact the clinic through [employer] or phone. If you have a critical or abnormal lab result, we will notify you by phone as soon as possible.  Submit refill requests through [employer] or call your pharmacy and they will forward the refill request to us. Please allow 3 business days for your refill to be completed.          Additional Information About Your Visit        [employer] Information     [employer] gives you secure access to your electronic health record. If you see a primary care provider, you can also send messages to your care team and make appointments. If you have questions, please call your primary care clinic.  If you do not have a primary care provider, please call [phone] and they  "will assist you.        Care EveryWhere ID     This is your Care EveryWhere ID. This could be used by other organizations to access your [place] medical records  [id number]        Your Vitals Were     Pulse Temperature Height Pulse Oximetry BMI (Body Mass Index)       59 97.9  F (36.6  C) (Oral) 6' 1\" (1.854 m) 100% 44.51 kg/m2        Blood Pressure from Last 3 Encounters:   10/31/18 134/82   10/03/18 138/88   03/07/18 130/80    Weight from Last 3 Encounters:   10/31/18 (!) 337 lb 6.4 oz (153 kg)   10/03/18 (!) 337 lb (152.9 kg)   03/07/18 (!) 330 lb 9.6 oz (150 kg)              We Performed the Following     DESTRUCT BENIGN LESION, UP TO 14          Where to get your medicines      These medications were sent to [facility]/pharmacy #[id number] - [facility], [place] - [address]  [address], [place] [facility] [address]     Phone:  [phone]     fluticasone 50 MCG/ACT spray          Primary Care Provider Office Phone # Fax #    Mona Aiyana Scales [phone]       [address]  [place] [address]        Equal Access to Services     [facility]: Hadii aad ku hadasho Soomaali, waaxda luqadaha, qaybta kaalmada adeegyada, waxay jaqui hayromana guajardo . So [facility] [phone].    ATENCIÓN: Si habla español, tiene a cortes disposición servicios gratuitos de asistencia lingüística. Llame al [phone].    We comply with applicable federal civil rights laws and [place] laws. We do not discriminate on the basis of race, color, national origin, age, disability, sex, sexual orientation, or gender identity.            Thank you!     Thank you for choosing [facility]  for your care. Our goal is always to provide you with excellent care. Hearing back from our patients is one way we can continue to improve our services. Please take a few minutes to complete the written survey that you may receive in the mail after your visit with us. Thank you!           "   Your Updated Medication List - Protect others around you: Learn how to safely use, store and throw away your medicines at www.disposemymeds.org.          This list is accurate as of 10/31/18  9:49 AM.  Always use your most recent med list.                   Brand Name Dispense Instructions for use Diagnosis    ACE/ARB/ARNI NOT PRESCRIBED (INTENTIONAL)      continuous prn for other Reported on 4/3/2017    Type 2 diabetes mellitus without complication (H)       albuterol 108 (90 Base) MCG/ACT inhaler    PROAIR HFA    1 Inhaler    Inhale 2 puffs into the lungs every 4 hours as needed (for asthma symptoms)    Intermittent asthma, uncomplicated       amLODIPine 10 MG tablet    NORVASC    90 tablet    Take 1 tablet (10 mg) by mouth daily    Hypertension goal BP (blood pressure) < 140/90       aspirin 81 MG tablet      1 TABLET DAILY (*)        azelastine 0.05 % ophthalmic solution    OPTIVAR    1 Bottle    Apply 1 drop to eye 2 times daily    Allergic conjunctivitis of both eyes       blood glucose monitoring test strip    ONETOUCH ULTRA    1 Box    by In Vitro route 2 times daily.    Type 2 diabetes, HbA1c goal < 7% (H)       carvedilol 25 MG tablet    COREG    180 tablet    Take 1 tablet (25 mg) by mouth 2 times daily (with meals)    Hypertension goal BP (blood pressure) < 140/90       CITRACAL CALCIUM+D PO      3 times daily. Take one tablet twice daily.        diclofenac 50 MG EC tablet    VOLTAREN    60 tablet    Take 1 tablet (50 mg) by mouth 3 times daily as needed for moderate pain    Acute pain of left shoulder       fluticasone 50 MCG/ACT spray    FLONASE    16 g    Spray 1-2 sprays into both nostrils daily    Seasonal allergic rhinitis, unspecified trigger       order for DME     1 kit    One touch glucometer with supplies to replace old meter for testing twice daily    Type 2 diabetes, HbA1c goal < 7% (H)       order for DME     12 each    Injection Supplies for Vitamin B12: 3cc syringes w/ 27 gauge needles,  1/2 inch length    Achlorhydria, gastric       pravastatin 80 MG tablet    PRAVACHOL    90 tablet    Take 1 tablet (80 mg) by mouth every evening    Hyperlipidemia LDL goal <100

## 2018-10-31 NOTE — PATIENT INSTRUCTIONS
At [facility], we strive to deliver an exceptional experience to you, every time we see you.  If you receive a survey in the mail, please send us back your thoughts. We really do value your feedback.    Your care team:                            Family Medicine Internal Medicine   MD Gurpreet Howard MD Shantel Branch-Fleming, MD Katya Georgiev PA-C Megan Hill, APRN DILIP Yin MD Pediatrics   Dmitry Chavez, GABBIE Portillo, MD Mouna Richardson APRN CNP   MD Marge Willson MD Deborah Mielke, MD Candelaria Denton, APRN [facility]      Clinic hours: Monday - Thursday 7 am-7 pm; Fridays 7 am-5 pm.   Urgent care: Monday - Friday 11 am-9 pm; Saturday and Sunday 9 am-5 pm.  Pharmacy : Monday -Thursday 8 am-8 pm; Friday 8 am-6 pm; Saturday and Sunday 9 am-5 pm.     Clinic: [phone]   Pharmacy: [phone]

## 2018-11-01 ASSESSMENT — ASTHMA QUESTIONNAIRES: ACT_TOTALSCORE: 21

## 2018-11-01 NOTE — PROGRESS NOTES
Mr. Estuardo,    Your shoulder xray was normal.  Try the physical therapy.    Please contact the clinic if you have additional questions.  Thank you.    Sincerely,    Mona Scales

## 2018-11-16 ENCOUNTER — TELEPHONE (OUTPATIENT)
Dept: FAMILY MEDICINE | Facility: CLINIC | Age: 53
End: 2018-11-16

## 2018-11-16 DIAGNOSIS — J30.2 SEASONAL ALLERGIC RHINITIS, UNSPECIFIED TRIGGER: Primary | ICD-10-CM

## 2018-11-16 DIAGNOSIS — M25.512 ACUTE PAIN OF LEFT SHOULDER: ICD-10-CM

## 2018-11-16 NOTE — TELEPHONE ENCOUNTER
Spoke to patient referral information given also would like referral for physical therapy for left shoulder.  Fredi MCMANUS

## 2018-11-16 NOTE — TELEPHONE ENCOUNTER
Reason for Call:  Other call back    Detailed comments: Pt calling for he is recently dealing with allergies and would like to know if he would need a referral to make an apt to see an Allergist or not.    Phone Number Patient can be reached at: Cell number on file:    Telephone Information:   Mobile [phone]       Best Time: anytime    Can we leave a detailed message on this number? YES    Call taken on 11/16/2018 at 8:22 AM by Alberto Glover

## 2018-11-19 PROBLEM — M25.512 ACUTE PAIN OF LEFT SHOULDER: Status: ACTIVE | Noted: 2018-11-19

## 2018-11-19 NOTE — TELEPHONE ENCOUNTER
This writer attempted to contact patient  on 11/19/18      Reason for call physical therapy and left detailed message.      If patient calls back:   Relay message, (read verbatim), document that pt called and close encounter        Heidi Soriano, CMA

## 2018-11-20 ENCOUNTER — OFFICE VISIT (OUTPATIENT)
Dept: ALLERGY | Facility: CLINIC | Age: 53
End: 2018-11-20
Payer: COMMERCIAL

## 2018-11-20 VITALS
OXYGEN SATURATION: 98 % | DIASTOLIC BLOOD PRESSURE: 88 MMHG | HEART RATE: 66 BPM | SYSTOLIC BLOOD PRESSURE: 154 MMHG | BODY MASS INDEX: 44.94 KG/M2 | WEIGHT: 315 LBS

## 2018-11-20 DIAGNOSIS — T78.1XXA ADVERSE REACTION TO FOOD, INITIAL ENCOUNTER: ICD-10-CM

## 2018-11-20 DIAGNOSIS — Z91.013 SHELLFISH ALLERGY: ICD-10-CM

## 2018-11-20 DIAGNOSIS — J45.20 MILD INTERMITTENT ASTHMA WITHOUT COMPLICATION: Primary | ICD-10-CM

## 2018-11-20 DIAGNOSIS — J30.89 OTHER ALLERGIC RHINITIS: ICD-10-CM

## 2018-11-20 LAB
FEF 25/75: NORMAL
FEV-1: NORMAL
FEV1/FVC: NORMAL
FVC: NORMAL

## 2018-11-20 PROCEDURE — 94060 EVALUATION OF WHEEZING: CPT | Performed by: ALLERGY & IMMUNOLOGY

## 2018-11-20 PROCEDURE — 86003 ALLG SPEC IGE CRUDE XTRC EA: CPT | Performed by: ALLERGY & IMMUNOLOGY

## 2018-11-20 PROCEDURE — 36415 COLL VENOUS BLD VENIPUNCTURE: CPT | Performed by: ALLERGY & IMMUNOLOGY

## 2018-11-20 PROCEDURE — 99244 OFF/OP CNSLTJ NEW/EST MOD 40: CPT | Mod: 25 | Performed by: ALLERGY & IMMUNOLOGY

## 2018-11-20 RX ORDER — EPINEPHRINE 0.3 MG/.3ML
0.3 INJECTION SUBCUTANEOUS PRN
Qty: 4 ML | Refills: 1 | Status: SHIPPED | OUTPATIENT
Start: 2018-11-20

## 2018-11-20 NOTE — MR AVS SNAPSHOT
After Visit Summary   11/20/2018    Geovany Marte    MRN: [id number]           Patient Information     Date Of Birth          1965        Visit Information        Provider Department      11/20/2018 2:20 PM Citlalli Carlisle MD [facility]        Today's Diagnoses     Intermittent asthma    -  1    Shellfish allergy          Care Instructions    If you have any questions regarding your allergies, asthma, or what we discussed during your visit today please call the allergy clinic or contact us via [employer].    [facility]: [phone]      You may continue to take the nasal spray and eye drops as needed    You will need to hold all allergy medication, cough/cold medication for 7 days before testing. If you need to take it while on vacation you should do so and we can reschedule your appointment.    Schedule a follow-up visit for Thursday 11/29/18 and we can reschedule if needed          Follow-ups after your visit        Who to contact     If you have questions or need follow up information about today's clinic visit or your schedule please contact [facility] directly at [phone].  Normal or non-critical lab and imaging results will be communicated to you by [employer], letter or phone within 4 business days after the clinic has received the results. If you do not hear from us within 7 days, please contact the clinic through [employer] or phone. If you have a critical or abnormal lab result, we will notify you by phone as soon as possible.  Submit refill requests through [employer] or call your pharmacy and they will forward the refill request to us. Please allow 3 business days for your refill to be completed.          Additional Information About Your Visit        [employer] Information     [employer] gives you secure access to your electronic health record. If you see a primary care provider, you can also send messages to your care team and make appointments.  If you have questions, please call your primary care clinic.  If you do not have a primary care provider, please call [phone] and they will assist you.        Care EveryWhere ID     This is your Care EveryWhere ID. This could be used by other organizations to access your [place] medical records  [id number]        Your Vitals Were     Pulse Pulse Oximetry BMI (Body Mass Index)             66 98% 44.94 kg/m2          Blood Pressure from Last 3 Encounters:   11/20/18 154/88   10/31/18 134/82   10/03/18 138/88    Weight from Last 3 Encounters:   11/20/18 (!) 154.5 kg (340 lb 9.6 oz)   10/31/18 (!) 153 kg (337 lb 6.4 oz)   10/03/18 (!) 152.9 kg (337 lb)              We Performed the Following     Allergen clam IgE     Allergen crab IgE     Allergen lobster IgE     Allergen oyster IgE     Allergen salmon IgE     Allergen scallop IgE     Allergen shrimp IgE     Spirometry, Breathing Capacity: Normal Order, Clinic Performed        Primary Care Provider Office Phone # Fax #    Mona Bronson Tiffanie Scales [phone]       [address] [facility] [address]        Equal Access to Services     TAWNYA CHARLES : Hadii aad ku hadasho Soomaali, waaxda luqadaha, qaybta kaalmada adeegyada, waxay addisin haycaseyn caleb pressley. So [facility] [phone].    ATENCIÓN: Si habla español, tiene a cortes disposición servicios gratuitos de asistencia lingüística. Llame al [phone].    We comply with applicable federal civil rights laws and [place] laws. We do not discriminate on the basis of race, color, national origin, age, disability, sex, sexual orientation, or gender identity.            Thank you!     Thank you for choosing [facility]  for your care. Our goal is always to provide you with excellent care. Hearing back from our patients is one way we can continue to improve our services. Please take a few minutes to complete the written survey that you may receive in the mail after  your visit with us. Thank you!             Your Updated Medication List - Protect others around you: Learn how to safely use, store and throw away your medicines at www.disposemymeds.org.          This list is accurate as of 11/20/18  3:47 PM.  Always use your most recent med list.                   Brand Name Dispense Instructions for use Diagnosis    ACE/ARB/ARNI NOT PRESCRIBED (INTENTIONAL)      continuous prn for other Reported on 4/3/2017    Type 2 diabetes mellitus without complication (H)       albuterol 108 (90 Base) MCG/ACT inhaler    PROAIR HFA    1 Inhaler    Inhale 2 puffs into the lungs every 4 hours as needed (for asthma symptoms)    Intermittent asthma, uncomplicated       amLODIPine 10 MG tablet    NORVASC    90 tablet    Take 1 tablet (10 mg) by mouth daily    Hypertension goal BP (blood pressure) < 140/90       aspirin 81 MG tablet      1 TABLET DAILY (*)        azelastine 0.05 % ophthalmic solution    OPTIVAR    1 Bottle    Apply 1 drop to eye 2 times daily    Allergic conjunctivitis of both eyes       blood glucose monitoring test strip    ONETOUCH ULTRA    1 Box    by In Vitro route 2 times daily.    Type 2 diabetes, HbA1c goal < 7% (H)       carvedilol 25 MG tablet    COREG    180 tablet    Take 1 tablet (25 mg) by mouth 2 times daily (with meals)    Hypertension goal BP (blood pressure) < 140/90       CITRACAL CALCIUM+D PO      3 times daily. Take one tablet twice daily.        diclofenac 50 MG EC tablet    VOLTAREN    60 tablet    Take 1 tablet (50 mg) by mouth 3 times daily as needed for moderate pain    Acute pain of left shoulder       fluticasone 50 MCG/ACT spray    FLONASE    16 g    Spray 1-2 sprays into both nostrils daily    Seasonal allergic rhinitis, unspecified trigger       order for DME     1 kit    One touch glucometer with supplies to replace old meter for testing twice daily    Type 2 diabetes, HbA1c goal < 7% (H)       order for DME     12 each    Injection Supplies for  Vitamin B12: 3cc syringes w/ 27 gauge needles, 1/2 inch length    Achlorhydria, gastric       pravastatin 80 MG tablet    PRAVACHOL    90 tablet    Take 1 tablet (80 mg) by mouth every evening    Hyperlipidemia LDL goal <100

## 2018-11-20 NOTE — NURSING NOTE
The following nebulizer treatment was given:     MEDICATION: Albuterol Sulfate 2.5 mg  : [employer]  LOT #: [id number]  EXPIRATION DATE:  04/2020  NDC # 64811-559-62     Kelly Durant RN

## 2018-11-20 NOTE — Clinical Note
11/20/2018         RE: Geovany Marte  [address]  [place] [address]        Dear Colleague,    Thank you for referring your patient, Geovany Marte, to the [facility] ER. Please see a copy of my visit note below.    Dear Mona Scales MD,    Thank you for referring your patient Geovany Marte to the Allergy/Immunology Clinic. Geovany Marte was seen in the Allergy Clinic at [facility]. The following are my recommendations regarding his {Allergydiagnoses:[id number]}    Geovany Marte is a 53 year old  male being seen today at the request of Dr. Tiffanie Scales in consultation for allergies.    States he has been having allergy symptoms. Reports having sinus pressure, congestion, dry and irritated eyes - has some burnign when he uses the eye drops. Denies sneezing/rhinorrhea. Symptoms are present in the late fall/early winter months. Has milder symptoms in the summer months. Was taking claritin daily but didn't feel it was helpful. Hasn't taken it 4 days. Never been tested for allergies in the past, no symptoms as a child or young adult.    Using flonase - 1 spray in each nostril daily. Using afrin 3 days on, 5 days off, up to twice daily.    Has an allergy to shellfish - takes fish oil and aspirin daily. Hasn't taken these in a few days and his symptoms have nto been as bad recently. Ate shrimp many years ago - had a reaction and felt throat tightness and difficulty breathing and swallowing, occurred within minutes of eating shrimp. If he is even around people eating shrimp he develops similar symptoms. Never formally tested and does not carry an epinephrine auto-injector. Avoids all shellfish. Eats catfish and tilapia. Has accidentally eaten fried rice containign shrimp and had immediate symptoms - took benadryl.    Has been prescribed albuterol to be used as needed. Has not been diagnosed with asthma. Uses albuterol 3 times per year. Denies nocturnal asthma  symptoms. Denies limitation in his daily activity. He is able to walk up a flight of stairs.      Past Medical History:   Diagnosis Date     Anemia      Asthma      DM (diabetes mellitus) (H)      GERD (gastroesophageal reflux disease)      HTN      Hyperlipidemia      Morbid obesity (H)      Nuclear sclerosis 5/17/2013     Family History   Problem Relation Age of Onset     Cerebrovascular Disease Mother      Hypertension Mother      Diabetes Father      Hypertension Father      HEART DISEASE Brother      Breast Cancer Sister      Cancer Sister      Cerebrovascular Disease Brother      Breast Cancer Sister      Asthma Son      Thyroid Disease No family hx of      Glaucoma No family hx of      Macular Degeneration No family hx of      Past Surgical History:   Procedure Laterality Date     ENT SURGERY      tonsils removed at 19 years old     ESOPHAGOSCOPY, GASTROSCOPY, DUODENOSCOPY (EGD), COMBINED N/A 3/18/2015    Procedure: COMBINED ESOPHAGOSCOPY, GASTROSCOPY, DUODENOSCOPY (EGD);  Surgeon: Jae Robles MD;  Location:  GI     LAPAROSCOPIC BYPASS GASTRIC  6/22/2011    Procedure:LAPAROSCOPIC BYPASS GASTRIC; Surgeon:HANNAH SHEPARD; Location: OR       ENVIRONMENTAL HISTORY: [place]. The home is heated with a radiant heat. They do not have central air conditioning. The patient's bedroom is furnished with hard julia in bedroom.  Pets inside the house include None. There is no history of cockroach or mice infestation. There is/are 0 smokers in the house.  The house does not have a damp basement.     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 He lives with his spouse.  His spouse works as a para-professional at [employer].    REVIEW OF SYSTEMS:  General: negative for weight gain. negative for weight loss. negative for changes in sleep.   Eyes: negative for itching. negative for  redness. negative for tearing/watering. negative for vision changes  Ears: negative for fullness. negative for hearing loss. negative for dizziness.   Nose: negative for snoring.negative for changes in smell. positive  for drainage.   Throat: negative for hoarseness. negative for sore throat. negative for trouble swallowing.   Lungs: negative for cough. negative for shortness of breath.negative for wheezing. negative for sputum production.   Cardiovascular: negative for chest pain. negative for swelling of ankles. negative for fast or irregular heartbeat.   Gastrointestinal: negative for nausea. negative for heartburn. negative for acid reflux.   Musculoskeletal: negative for joint pain. negative for joint stiffness. negative for joint swelling.   Neurologic: negative for seizures. negative for fainting. negative for weakness.   Psychiatric: negative for changes in mood. negative for anxiety.   Endocrine: negative for cold intolerance. negative for heat intolerance. negative for tremors.   Hematologic: negative for easy bruising. negative for easy bleeding.  Integumentary: negative for rash. negative for scaling. negative for nail changes.       Current Outpatient Prescriptions:      albuterol (PROAIR HFA) 108 (90 BASE) MCG/ACT Inhaler, Inhale 2 puffs into the lungs every 4 hours as needed (for asthma symptoms), Disp: 1 Inhaler, Rfl: 2     amLODIPine (NORVASC) 10 MG tablet, Take 1 tablet (10 mg) by mouth daily, Disp: 90 tablet, Rfl: 1     azelastine (OPTIVAR) 0.05 % ophthalmic solution, Apply 1 drop to eye 2 times daily, Disp: 1 Bottle, Rfl: 11     Calcium-Magnesium-Vitamin D (CITRACAL CALCIUM+D PO), 3 times daily. Take one tablet twice daily., Disp: , Rfl:      carvedilol (COREG) 25 MG tablet, Take 1 tablet (25 mg) by mouth 2 times daily (with meals), Disp: 180 tablet, Rfl: 3     fluticasone (FLONASE) 50 MCG/ACT spray, Spray 1-2 sprays into both nostrils daily, Disp: 16 g, Rfl: 11     glucose blood VI test strips  (ONE TOUCH ULTRA TEST) strip, by In Vitro route 2 times daily., Disp: 1 Box, Rfl: 12     ORDER FOR DME, Injection Supplies for Vitamin B12: 3cc syringes w/ 27 gauge needles, 1/2 inch length, Disp: 12 each, Rfl: 0     ORDER FOR DME, One touch glucometer with supplies to replace old meter for testing twice daily, Disp: 1 kit, Rfl: prn     pravastatin (PRAVACHOL) 80 MG tablet, Take 1 tablet (80 mg) by mouth every evening, Disp: 90 tablet, Rfl: 3     ACE/ARB NOT PRESCRIBED, INTENTIONAL,, continuous prn for other Reported on 4/3/2017, Disp: , Rfl:      ASPIRIN 81 MG PO TABS, 1 TABLET DAILY (*), Disp: , Rfl:      diclofenac (VOLTAREN) 50 MG EC tablet, Take 1 tablet (50 mg) by mouth 3 times daily as needed for moderate pain (Patient not taking: Reported on 11/20/2018), Disp: 60 tablet, Rfl: 1  Immunization History   Administered Date(s) Administered     Influenza (H1N1) 01/11/2010     Influenza (IIV3) PF 10/29/2001, 11/24/2008, 09/22/2010, 10/03/2011, 11/27/2012     Influenza Quad, Recombinant, p-free (RIV4) 10/03/2018     Influenza Vaccine IM 3yrs+ 4 Valent IIV4 12/19/2013, 02/08/2016, 10/03/2016, 09/25/2017     Pneumococcal 23 valent 10/29/2001, 01/01/2007     TD (ADULT, 7+) 04/10/2003     TDAP Vaccine (Adacel) 08/06/2013     Allergies   Allergen Reactions     Lisinopril Anaphylaxis     Shellfish Allergy Difficulty breathing     Throat closing up     Shellfish-Derived Products      Throat swells up, hard time breathing         EXAM:   /88 (BP Location: Left arm, Patient Position: Sitting, Cuff Size: Adult Large)  Pulse 66  Wt (!) 154.5 kg (340 lb 9.6 oz)  SpO2 98%  BMI 44.94 kg/m2  GENERAL APPEARANCE: alert, cooperative and not in distress  SKIN: no rashes, no lesions  HEAD: atraumatic, normocephalic  EYES: lids and lashes normal, conjunctivae and sclerae clear, pupils equal, round, reactive to light, EOM full and intact  ENT: no scars or lesions, nasal exam showed no discharge, swelling or lesions noted,  otoscopy showed external auditory canals clear, tympanic membranes normal, tongue midline and normal, soft palate, uvula, and tonsils normal  NECK: no asymmetry, masses, or scars, supple without significant adenopathy  LUNGS: unlabored respirations, no intercostal retractions or accessory muscle use, clear to auscultation without rales or wheezes  HEART: regular rate and rhythm without murmurs and normal S1 and S2  MUSCULOSKELETAL: no musculoskeletal defects are noted  NEURO: no focal deficits noted  PSYCH: does not appear depressed or anxious    WORKUP: Spirometry  SPIROMETRY  initial FVC 3.37L (62% of predicted); after bronchodilator 3.28L (-3% change)   initial FEV1 2.47L (59% of predicted); after bronchodilator 2.61L (6% change)  initial FEV1/FVC 73%; after bronchodilator 80%  initial FEF 25%-75% 1.78L/s (51% of predicted); after bronchodilator 2.39L/s (35% change)    These values are consistent with mild-moderate airflow obstruction with restrictive ventilatory defect. There was significant change in [id number] after bronchodilator administration.    ASSESSMENT/PLAN:  Geovany Marte is a 53 year old male    ***  Patient to follow up with Primary Care provider regarding elevated blood pressure.      Citlalli Carlisle MD  Allergy/Immunology  [facility] and [place], [place]      Chart documentation done in part with Dragon Voice Recognition Software. Although reviewed after completion, some word and grammatical errors may remain.    Again, thank you for allowing me to participate in the care of your patient.        Sincerely,        Citlalli Carlisle MD

## 2018-11-20 NOTE — PROGRESS NOTES
Dear Mona Scales MD,    Thank you for referring your patient Geovany Marte to the Allergy/Immunology Clinic. Geovany Marte was seen in the Allergy Clinic at [facility]. The following are my recommendations regarding his Intermittent Asthma, Allergic Rhinitis, Shellfish Allergy and Adverse Reaction to Food    1. Continue to avoid all shellfish and salmon  2. Will obtain in vitro IgE testing to shellfish and salmon  3. Use epinephrine auto-injector as directed for severe allergic reactions  4. Take 10mg of cetirizine as directed for mild allergic reactions  5. Continue fluticasone nasal spray, 2 sprays in each nostril daily  6. Continue loratadine 10mg daily - hold for 7 days prior to returning for skin testing  7. Use albuterol HFA, 2-4 puffs every 4 hours as needed  8. Patient to follow up with Primary Care provider regarding elevated blood pressure.      Geovany Marte is a 53 year old  male being seen today at the request of Dr. Tiffanie Scales in consultation for allergies. He reports that he has allergy symptoms throughout the year which are worse in the late fall and early winter months. Geovany's symptoms include dry and irritated eyes, nasal congestion, and sinus pressure. He denies symptoms of sneezing, rhinorrhea, post-nasal drainage, throat clearing, or cough. Over the last few months he has been taking claritin daily but did not find it to be helpful. He has been using eye drops and states that these cause his eyes to burn. Geovany has also been taking flonase daily and using afrin intermittently. He has never previously been tested or evaluated for allergies.    Geovany also reports having an allergy to shellfish. Many years ago he was eating shrimp and developed throat tightness, difficulty swallowing, and difficulty breathing. These symptoms occurred within minutes of eating the shrimp. He has avoided shellfish but reports that even if he is around others eating shrimp  he will have similar symptoms. Geovany has never been formally tested for a shellfish allergy and does not carry an epinephrine auto-injector. He does eat some regular fish such as catfish and tilapia but reports having symptoms after eating salmon. He inquires as to whether it is safe for him to take a fish oil supplement on a daily basis.    Geovany does not have a diagnosis of asthma. He was prescribed an albuterol inhaler during an acute illness and uses it about 3 times per year. He denies having nocturnal symptoms of cough, wheezing, or shortness of breath and has not had any limitations in his activity.       Past Medical History:   Diagnosis Date     Anemia      Asthma      DM (diabetes mellitus) (H)      GERD (gastroesophageal reflux disease)      HTN      Hyperlipidemia      Morbid obesity (H)      Nuclear sclerosis 5/17/2013     Family History   Problem Relation Age of Onset     Cerebrovascular Disease Mother      Hypertension Mother      Diabetes Father      Hypertension Father      HEART DISEASE Brother      Breast Cancer Sister      Cancer Sister      Cerebrovascular Disease Brother      Breast Cancer Sister      Asthma Son      Thyroid Disease No family hx of      Glaucoma No family hx of      Macular Degeneration No family hx of      Past Surgical History:   Procedure Laterality Date     ENT SURGERY      tonsils removed at 19 years old     ESOPHAGOSCOPY, GASTROSCOPY, DUODENOSCOPY (EGD), COMBINED N/A 3/18/2015    Procedure: COMBINED ESOPHAGOSCOPY, GASTROSCOPY, DUODENOSCOPY (EGD);  Surgeon: Jae Robles MD;  Location:  GI     LAPAROSCOPIC BYPASS GASTRIC  6/22/2011    Procedure:LAPAROSCOPIC BYPASS GASTRIC; Surgeon:HANNAH SHEPARD; Location: OR       ENVIRONMENTAL HISTORY: [place] The home is heated with a radiant heat. They do not have central air conditioning. The patient's bedroom is furnished with hard julia in bedroom.  Pets inside the  house include None. There is no history of cockroach or mice infestation. There is/are 0 smokers in the house.  The house does not have a damp basement.     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 He lives with his spouse.  His spouse works as a para-professional at [employer].    REVIEW OF SYSTEMS:  General: negative for weight gain. negative for weight loss. negative for changes in sleep.   Eyes: negative for itching. negative for redness. negative for tearing/watering. negative for vision changes  Ears: negative for fullness. negative for hearing loss. negative for dizziness.   Nose: negative for snoring.negative for changes in smell. positive  for drainage.   Throat: negative for hoarseness. negative for sore throat. negative for trouble swallowing.   Lungs: negative for cough. negative for shortness of breath.negative for wheezing. negative for sputum production.   Cardiovascular: negative for chest pain. negative for swelling of ankles. negative for fast or irregular heartbeat.   Gastrointestinal: negative for nausea. negative for heartburn. negative for acid reflux.   Musculoskeletal: negative for joint pain. negative for joint stiffness. negative for joint swelling.   Neurologic: negative for seizures. negative for fainting. negative for weakness.   Psychiatric: negative for changes in mood. negative for anxiety.   Endocrine: negative for cold intolerance. negative for heat intolerance. negative for tremors.   Hematologic: negative for easy bruising. negative for easy bleeding.  Integumentary: negative for rash. negative for scaling. negative for nail changes.       Current Outpatient Prescriptions:      albuterol (PROAIR HFA) 108 (90 BASE) MCG/ACT Inhaler, Inhale 2 puffs into the lungs every 4 hours as needed (for asthma symptoms), Disp: 1 Inhaler, Rfl: 2     amLODIPine (NORVASC) 10 MG tablet, Take 1 tablet (10 mg) by mouth  daily, Disp: 90 tablet, Rfl: 1     azelastine (OPTIVAR) 0.05 % ophthalmic solution, Apply 1 drop to eye 2 times daily, Disp: 1 Bottle, Rfl: 11     Calcium-Magnesium-Vitamin D (CITRACAL CALCIUM+D PO), 3 times daily. Take one tablet twice daily., Disp: , Rfl:      carvedilol (COREG) 25 MG tablet, Take 1 tablet (25 mg) by mouth 2 times daily (with meals), Disp: 180 tablet, Rfl: 3     fluticasone (FLONASE) 50 MCG/ACT spray, Spray 1-2 sprays into both nostrils daily, Disp: 16 g, Rfl: 11     glucose blood VI test strips (ONE TOUCH ULTRA TEST) strip, by In Vitro route 2 times daily., Disp: 1 Box, Rfl: 12     ORDER FOR DME, Injection Supplies for Vitamin B12: 3cc syringes w/ 27 gauge needles, 1/2 inch length, Disp: 12 each, Rfl: 0     ORDER FOR DME, One touch glucometer with supplies to replace old meter for testing twice daily, Disp: 1 kit, Rfl: prn     pravastatin (PRAVACHOL) 80 MG tablet, Take 1 tablet (80 mg) by mouth every evening, Disp: 90 tablet, Rfl: 3     ACE/ARB NOT PRESCRIBED, INTENTIONAL,, continuous prn for other Reported on 4/3/2017, Disp: , Rfl:      ASPIRIN 81 MG PO TABS, 1 TABLET DAILY (*), Disp: , Rfl:      diclofenac (VOLTAREN) 50 MG EC tablet, Take 1 tablet (50 mg) by mouth 3 times daily as needed for moderate pain (Patient not taking: Reported on 11/20/2018), Disp: 60 tablet, Rfl: 1  Immunization History   Administered Date(s) Administered     Influenza (H1N1) 01/11/2010     Influenza (IIV3) PF 10/29/2001, 11/24/2008, 09/22/2010, 10/03/2011, 11/27/2012     Influenza Quad, Recombinant, p-free (RIV4) 10/03/2018     Influenza Vaccine IM 3yrs+ 4 Valent IIV4 12/19/2013, 02/08/2016, 10/03/2016, 09/25/2017     Pneumococcal 23 valent 10/29/2001, 01/01/2007     TD (ADULT, 7+) 04/10/2003     TDAP Vaccine (Adacel) 08/06/2013     Allergies   Allergen Reactions     Lisinopril Anaphylaxis     Shellfish Allergy Difficulty breathing     Throat closing up     Shellfish-Derived Products      Throat swells up, hard time  breathing         EXAM:   /88 (BP Location: Left arm, Patient Position: Sitting, Cuff Size: Adult Large)  Pulse 66  Wt (!) 154.5 kg (340 lb 9.6 oz)  SpO2 98%  BMI 44.94 kg/m2  GENERAL APPEARANCE: alert, cooperative and not in distress  SKIN: no rashes, no lesions  HEAD: atraumatic, normocephalic  EYES: lids and lashes normal, conjunctivae and sclerae clear, pupils equal, round, reactive to light, EOM full and intact  ENT: no scars or lesions, nasal exam showed no discharge, swelling or lesions noted, otoscopy showed external auditory canals clear, tympanic membranes normal, tongue midline and normal, soft palate, uvula, and tonsils normal  NECK: no asymmetry, masses, or scars, supple without significant adenopathy  LUNGS: unlabored respirations, no intercostal retractions or accessory muscle use, clear to auscultation without rales or wheezes  HEART: regular rate and rhythm without murmurs and normal S1 and S2  MUSCULOSKELETAL: no musculoskeletal defects are noted  NEURO: no focal deficits noted  PSYCH: does not appear depressed or anxious    WORKUP: Spirometry  SPIROMETRY  initial FVC 3.37L (62% of predicted); after bronchodilator 3.28L (-3% change)   initial FEV1 2.47L (59% of predicted); after bronchodilator 2.61L (6% change)  initial FEV1/FVC 73%; after bronchodilator 80%  initial FEF 25%-75% 1.78L/s (51% of predicted); after bronchodilator 2.39L/s (35% change)    These values are consistent with mild-moderate airflow obstruction with restrictive ventilatory defect. There was significant change in [id number] after bronchodilator administration.    ASSESSMENT/PLAN:  Geovany Marte is a 53 year old male here for evaluation of allergies. Skin testing could not be performed today due to recent antihistamine use. His history is consistent with possible IgE mediated food allergies to shellfish and salmon. He was advised to continue to avoid these foods and return for skin testing. Geovany was counseled regarding  signs and symptoms of IgE mediated food allergies as well as management of potential future reactions.    Geovany denies having a history of asthma though albuterol has been prescribed in the past. Spirometry was consistent with airflow obstruction without significant improvement after bronchodilator administration. He does not have current symptoms of cough, wheezing, or shortness of breath and reports rare use of albuterol.    1. Continue to avoid all shellfish and salmon  2. Will obtain in vitro IgE testing to shellfish and salmon  3. Use epinephrine auto-injector as directed for severe allergic reactions  4. Take 10mg of cetirizine as directed for mild allergic reactions  5. Continue fluticasone nasal spray, 2 sprays in each nostril daily  6. Continue loratadine 10mg daily - hold for 7 days prior to returning for skin testing  7. Use albuterol HFA, 2-4 puffs every 4 hours as needed  8. Patient to follow up with Primary Care provider regarding elevated blood pressure.      Citlalli Carlisle MD  Allergy/Immunology  [facility] and [place], [place]      Chart documentation done in part with Dragon Voice Recognition Software. Although reviewed after completion, some word and grammatical errors may remain.

## 2018-11-20 NOTE — PATIENT INSTRUCTIONS
If you have any questions regarding your allergies, asthma, or what we discussed during your visit today please call the allergy clinic or contact us via [employer].    Jonathan Cui/[facility]: [phone]      You may continue to take the nasal spray and eye drops as needed    You will need to hold all allergy medication, cough/cold medication for 7 days before testing. If you need to take it while on vacation you should do so and we can reschedule your appointment.    Schedule a follow-up visit for Thursday 11/29/18 and we can reschedule if needed

## 2018-11-20 NOTE — NURSING NOTE
Writer demonstrated how to use an Auvi-Q Epinephrine auto-injector.  Patient instructed to remove cap from device and follow the instructions given by the recorded audio. This includes removing the red safety release by pulling straight out, then firmly pushing the black tip against outer thigh until it clicks, hold for 2 seconds.  Patient advised that once used, needle will not be exposed, as it retracts back into the device.  Patient advised to call 911 or go to emergency department after Auvi-Q use for further monitoring.         RN reviewed Anaphylaxis Action Plan with patient. Educated on the symptoms and treatment of anaphylaxis. Went through the different ways that a reaction can present, and the body systems that it can affect. Patient verbalized understanding.     Kelly Durant RN

## 2018-11-20 NOTE — LETTER
My Asthma Action Plan  Name: Geovany Marte   YOB: 1965  Date: 12/7/2018   My doctor: Citlalli Carlisle MD   My clinic: [facility]        My Control Medicine: None  My Rescue Medicine: Albuterol (Proair/Ventolin/Proventil) inhaler 2-4 puffs every 4 hours as needed   My Asthma Severity: intermittent  Avoid your asthma triggers: Patient is unaware of triggers  None            GREEN ZONE   Good Control    I feel good    No cough or wheeze    Can work, sleep and play without asthma symptoms       Take your asthma control medicine every day.     1. If exercise triggers your asthma, take your rescue medication    15 minutes before exercise or sports, and    During exercise if you have asthma symptoms  2. Spacer to use with inhaler: If you have a spacer, make sure to use it with your inhaler             YELLOW ZONE Getting Worse  I have ANY of these:    I do not feel good    Cough or wheeze    Chest feels tight    Wake up at night   1. Keep taking your Green Zone medications  2. Start taking your rescue medicine:    every 20 minutes for up to 1 hour. Then every 4 hours for 24-48 hours.  3. If you stay in the Yellow Zone for more than 12-24 hours, contact your doctor.           RED ZONE Medical Alert - Get Help  I have ANY of these:    I feel awful    Medicine is not helping    Breathing getting harder    Trouble walking or talking    Nose opens wide to breathe       1. Take your rescue medicine NOW  2. If your provider has prescribed an oral steroid medicine, start taking it NOW  3. Call your doctor NOW  4. If you are still in the Red Zone after 20 minutes and you have not reached your doctor:    Take your rescue medicine again and    Call 911 or go to the emergency room right away    See your regular doctor within 2 weeks of an Emergency Room or Urgent Care visit for follow-up treatment.          Annual Reminders:  Meet with Asthma Educator,  Flu Shot in the Fall, consider Pneumonia Vaccination for  patients with asthma (aged 19 and older).    Pharmacy:    [facility] #[id number] - [place], [place] - [address] [facility] - [place], [place] - [address] [facility] [phone]  [facility] [place] - [address] [place] [address]                      Asthma Triggers  How To Control Things That Make Your Asthma Worse    Triggers are things that make your asthma worse.  Look at the list below to help you find your triggers and what you can do about them.  You can help prevent asthma flare-ups by staying away from your triggers.      Trigger                                                          What you can do   Cigarette Smoke  Tobacco smoke can make asthma worse. Do not allow smoking in your home, car or around you.  Be sure no one smokes at a child s day care or school.  If you smoke, ask your health care provider for ways to help you quit.  Ask family members to quit too.  Ask your health care provider for a referral to Quit Plan to help you quit smoking, or call 7-950-878-PLAN.     Colds, Flu, Bronchitis  These are common triggers of asthma. Wash your hands often.  Don t touch your eyes, nose or mouth.  Get a flu shot every year.     Dust Mites  These are tiny bugs that live in cloth or carpet. They are too small to see. Wash sheets and blankets in hot water every week.   Encase pillows and mattress in dust mite proof covers.  Avoid having carpet if you can. If you have carpet, vacuum weekly.   Use a dust mask and HEPA vacuum.   Pollen and Outdoor Mold  Some people are allergic to trees, grass, or weed pollen, or molds. Try to keep your windows closed.  Limit time out doors when pollen count is high.   Ask you health care provider about taking medicine during allergy season.     Animal Dander  Some people are allergic to skin flakes, urine or saliva from pets with fur or feathers. Keep pets with fur or feathers out of your home.    If you can t keep the pet outdoors, then keep the  pet out of your bedroom.  Keep the bedroom door closed.  Keep pets off cloth furniture and away from stuffed toys.     Mice, Rats, and Cockroaches  Some people are allergic to the waste from these pests.   Cover food and garbage.  Clean up spills and food crumbs.  Store grease in the refrigerator.   Keep food out of the bedroom.   Indoor Mold  This can be a trigger if your home has high moisture. Fix leaking faucets, pipes, or other sources of water.   Clean moldy surfaces.  Dehumidify basement if it is damp and smelly.   Smoke, Strong Odors, and Sprays  These can reduce air quality. Stay away from strong odors and sprays, such as perfume, powder, hair spray, paints, smoke incense, paint, cleaning products, candles and new carpet.   Exercise or Sports  Some people with asthma have this trigger. Be active!  Ask your doctor about taking medicine before sports or exercise to prevent symptoms.    Warm up for 5-10 minutes before and after sports or exercise.     Other Triggers of Asthma  Cold air:  Cover your nose and mouth with a scarf.  Sometimes laughing or crying can be a trigger.  Some medicines and food can trigger asthma.

## 2018-11-20 NOTE — LETTER
ANAPHYLAXIS ALLERGY PLAN    Name: Geovany Marte      :  1965    Allergy to:  Shellfish    Weight: 340 lbs 9.6 oz           Asthma:  No      Do not depend on antihistamines or inhalers (bronchodilators) to treat a severe reaction; USE EPINEPHRINE      MEDICATIONS/DOSES  Epinephrine:  EpiPen/Adrenaclick/Auvi-Q  Epinephrine dose:  0.3 mg IM  Antihistamine:  Zyrtec (Cetirizine)  Antihistamine dose:  10mg  Other (e.g., inhaler-bronchodilator if wheezing):  None       ANAPHYLAXIS ALLERGY PLAN (Page 2)  Patient:  Geovany Marte  :  1965         Electronically signed on 2018 by:  Citlalli Carlisle MD  Parent/Guardian Authorization Signature:  ___________________________ Date:    FORM PROVIDED COURTESY OF FOOD ALLERGY RESEARCH & EDUCATION (FARE) ([email or web address]) 2017

## 2018-11-23 LAB
CLAM IGE QN: <0.1 KU(A)/L
CRAB IGE QN: <0.1 KU(A)/L
LOBSTER IGE QN: <0.1 KU(A)/L
OYSTER IGE QN: <0.1 KU(A)/L
SALMON IGE QN: <0.1 KU(A)/L
SCALLOP IGE QN: <0.1 KU(A)/L
SHRIMP IGE QN: <0.1 KU(A)/L

## 2018-12-03 ENCOUNTER — THERAPY VISIT (OUTPATIENT)
Dept: PHYSICAL THERAPY | Facility: CLINIC | Age: 53
End: 2018-12-03
Payer: COMMERCIAL

## 2018-12-03 DIAGNOSIS — M25.512 ACUTE PAIN OF LEFT SHOULDER: ICD-10-CM

## 2018-12-03 PROCEDURE — 97110 THERAPEUTIC EXERCISES: CPT | Mod: GP | Performed by: PHYSICAL THERAPIST

## 2018-12-03 PROCEDURE — 97161 PT EVAL LOW COMPLEX 20 MIN: CPT | Mod: GP | Performed by: PHYSICAL THERAPIST

## 2018-12-03 NOTE — MR AVS SNAPSHOT
After Visit Summary   12/3/2018    Geovany Marte    MRN: [id number]           Patient Information     Date Of Birth          1965        Visit Information        Provider Department      12/3/2018 3:00 PM Netta Pedroza, PT [facility]        Today's Diagnoses     Acute pain of left shoulder           Follow-ups after your visit        Your next 10 appointments already scheduled     Dec 10, 2018  1:00 PM CST   Return Visit with Citlalli Carlisle MD   [facility] ([facility])    [address] [facility] [address]   [phone]            Dec 10, 2018  3:00 PM CST   KINA Extremity with Netta Pedroza PT   [facility] ([facility]  )    [address]  [facility] [id number] [phone]            Dec 17, 2018  3:00 PM CST   KINA Extremity with Netta Pedroza PT   [facility] ([facility]  )    [address] [facility] [id number] [phone]              Who to contact     If you have questions or need follow up information about today's clinic visit or your schedule please contact [facility] directly at [phone].  Normal or non-critical lab and imaging results will be communicated to you by [employer], letter or phone within 4 business days after the clinic has received the results. If you do not hear from us within 7 days, please contact the clinic through [employer] or phone. If you have a critical or abnormal lab result, we will notify you by phone as soon as possible.  Submit refill requests through [employer] or call your pharmacy and they will forward the refill request to us. Please allow 3 business days for your refill to be completed.          Additional Information About Your Visit        [employer] Information     [employer] gives you secure access to your electronic health record. If you see a  primary care provider, you can also send messages to your care team and make appointments. If you have questions, please call your primary care clinic.  If you do not have a primary care provider, please call [phone] and they will assist you.        Care EveryWhere ID     This is your Care EveryWhere ID. This could be used by other organizations to access your [facility] medical records  [id number]         Blood Pressure from Last 3 Encounters:   11/20/18 154/88   10/31/18 134/82   10/03/18 138/88    Weight from Last 3 Encounters:   11/20/18 (!) 154.5 kg (340 lb 9.6 oz)   10/31/18 (!) 153 kg (337 lb 6.4 oz)   10/03/18 (!) 152.9 kg (337 lb)              We Performed the Following     HC PT EVAL, LOW COMPLEXITY     [facility] INITIAL EVAL REPORT     [facility] PT, HAND, AND CHIROPRACTIC REFERRAL     THERAPEUTIC EXERCISES        Primary Care Provider Office Phone # Fax #    Mona Bronson Tiffanie Scales [phone]       [address] [facility] [address]        Equal Access to Services     [facility] Center[facility]ESSICA : Hadii aad ku hadasho Soomaali, waaxda luqadaha, qaybta kaalmada adeegyada, omega nails haycaseyn caleb guajardo . So [facility] [phone].    ATENCIÓN: Si habla español, tiene a cortes disposición servicios gratuitos de asistencia lingüística. Llame al [phone].    We comply with applicable federal civil rights laws and [place] laws. We do not discriminate on the basis of race, color, national origin, age, disability, sex, sexual orientation, or gender identity.            Thank you!     Thank you for choosing [facility]  for your care. Our goal is always to provide you with excellent care. Hearing back from our patients is one way we can continue to improve our services. Please take a few minutes to complete the written survey that you may receive in the mail after your visit with us. Thank you!             Your Updated Medication List - Protect others around  you: Learn how to safely use, store and throw away your medicines at www.disposemymeds.org.          This list is accurate as of 12/3/18  6:59 PM.  Always use your most recent med list.                   Brand Name Dispense Instructions for use Diagnosis    ACE/ARB/ARNI NOT PRESCRIBED    INTENTIONAL     continuous prn for other Reported on 4/3/2017    Type 2 diabetes mellitus without complication (H)       albuterol 108 (90 Base) MCG/ACT inhaler    PROAIR HFA    1 Inhaler    Inhale 2 puffs into the lungs every 4 hours as needed (for asthma symptoms)    Intermittent asthma, uncomplicated       amLODIPine 10 MG tablet    NORVASC    90 tablet    Take 1 tablet (10 mg) by mouth daily    Hypertension goal BP (blood pressure) < 140/90       aspirin 81 MG tablet    ASA     1 TABLET DAILY (*)        azelastine 0.05 % ophthalmic solution    OPTIVAR    1 Bottle    Apply 1 drop to eye 2 times daily    Allergic conjunctivitis of both eyes       blood glucose monitoring test strip    ONETOUCH ULTRA    1 Box    by In Vitro route 2 times daily.    Type 2 diabetes, HbA1c goal < 7% (H)       carvedilol 25 MG tablet    COREG    180 tablet    Take 1 tablet (25 mg) by mouth 2 times daily (with meals)    Hypertension goal BP (blood pressure) < 140/90       CITRACAL CALCIUM+D PO      3 times daily. Take one tablet twice daily.        diclofenac 50 MG EC tablet    VOLTAREN    60 tablet    Take 1 tablet (50 mg) by mouth 3 times daily as needed for moderate pain    Acute pain of left shoulder       EPINEPHrine 0.3 MG/0.3ML injection 2-pack    AUVI-Q    4 mL    Inject 0.3 mLs (0.3 mg) into the muscle as needed for anaphylaxis    Shellfish allergy       fluticasone 50 MCG/ACT nasal spray    FLONASE    16 g    Spray 1-2 sprays into both nostrils daily    Seasonal allergic rhinitis, unspecified trigger       order for DME     1 kit    One touch glucometer with supplies to replace old meter for testing twice daily    Type 2 diabetes, HbA1c goal <  7% (H)       order for DME     12 each    Injection Supplies for Vitamin B12: 3cc syringes w/ 27 gauge needles, 1/2 inch length    Achlorhydria, gastric       pravastatin 80 MG tablet    PRAVACHOL    90 tablet    Take 1 tablet (80 mg) by mouth every evening    Hyperlipidemia LDL goal <100

## 2018-12-03 NOTE — PROGRESS NOTES
[facility] Initial Evaluation  Subjective:  Patient is a 53 year old male presenting with rehab left shoulder hpi. The history is provided by the patient. No  was used.   Geovany Marte is a 53 year old male with a left shoulder condition.  Condition occurred with:  Unknown cause.  Condition occurred: for unknown reasons.  This is a new condition  Patient presents to PT with c/o L shoulder pain.  Started ~ 2 months ago for unknown reason; pain is generally at night.  Denies having prior neck and shoulder pain.    Referred to PT on 11/19/18..    Patient reports pain:  In the joint and other (points to tip of shoulder).  Radiates to: none.  Pain is described as aching (deep ache) and is intermittent and reported as 4/10 (at night/ laying down).  Associated symptoms:  Painful arc. Pain is worse during the night.  Symptoms are exacerbated by lying on extremity and relieved by activity/movement and heat.  Since onset symptoms are unchanged.  Special tests:  X-ray (normal).      General health as reported by patient is fair.  Pertinent medical history includes:  Asthma, high blood pressure, overweight and diabetes.  Medical allergies: yes (shellfish).  Other surgeries include:  Other.  Current medications:  High blood pressure medication and anti-inflammatory.  Current occupation is  for [employer] ([employer]);  Professional Musician (piano).  Patient is working in normal job without restrictions.  Primary job tasks include:  Driving, repetitive tasks, prolonged standing and other (door to door work).    Barriers include:  None as reported by the patient.    Red flags:  None as reported by the patient.                        Objective:  Standing Alignment:    Cervical/Thoracic:  Forward head  Shoulder/UE:  Rounded shoulders  Lumbar:  Lordosis decr                                Cervical/Thoracic Evaluation    AROM:  AROM Cervical:    Flexion:          Chin to chest  Extension:        Min loss  Rotation:         Left: min loss     Right: min loss  Side Bend:      Left:     Right:       Headaches: none                         Shoulder Evaluation:  ROM:  AROM:  : no pain.  Flexion:  Left:  168    Right:  172  Extension: Left: 56Right: 54  Abduction:  Left: 172   Right:  168            Elbow Flexion:  Left:  WNL    Right:  WNL  Elbow Extension:  Left:  WNL   Right:  WNL              Strength:    Flexion: Left:4+/5   Pain:    Right: 5/5     Pain:   Extension:  Left: 5/5    Pain:    Right: 5/5    Pain:  Abduction:  Left: 4/5  Pain:    Right: 5-/5     Pain:  Adduction:  Left: 5/5    Pain:    Right: 5/5     Pain:  Internal Rotation:  Left:5/5     Pain:    Right: 5/5     Pain:  External Rotation:   Left:4/5     Pain:   Right:5-/5     Pain:        Elbow Flexion:  Left:5/5     Pain:    Right:5/5     Pain:  Elbow Extension:  Left:5/5     Pain:    Right:5/5     Pain:    Special Tests:    Left shoulder positive for the following special tests:  Impingement    Palpation:  normal                                         General     ROS    Assessment/Plan:    Patient is a 53 year old male with left side shoulder complaints.    Patient has the following significant findings with corresponding treatment plan.                Diagnosis 1:  L shoulder pain  Pain -  hot/cold therapy, US and manual therapy  Decreased strength - therapeutic exercise and therapeutic activities  Impaired muscle performance - neuro re-education  Decreased function - therapeutic activities  Impaired posture - neuro re-education    Therapy Evaluation Codes:   1) History comprised of:   Personal factors that impact the plan of care:      None.    Comorbidity factors that impact the plan of care are:      Diabetes and High blood pressure.     Medications impacting care: Anti-inflammatory.  2) Examination of Body Systems comprised of:   Body structures and functions that impact the plan of care:      Shoulder.   Activity limitations that  impact the plan of care are:      Lifting, Sleeping, Laying down and reaching.  3) Clinical presentation characteristics are:   Stable/Uncomplicated.  4) Decision-Making    Low complexity using standardized patient assessment instrument and/or measureable assessment of functional outcome.  Cumulative Therapy Evaluation is: Low complexity.    Previous and current functional limitations:  (See Goal Flow Sheet for this information)    Short term and Long term goals: (See Goal Flow Sheet for this information)     Communication ability:  Patient appears to be able to clearly communicate and understand verbal and written communication and follow directions correctly.  Treatment Explanation - The following has been discussed with the patient:   RX ordered/plan of care  Anticipated outcomes  Possible risks and side effects  This patient would benefit from PT intervention to resume normal activities.   Rehab potential is good.    Frequency:  1 X week, once daily  Duration:  for 4-6 weeks  Discharge Plan:  Achieve all LTG.  Independent in home treatment program.  Reach maximal therapeutic benefit.    Please refer to the daily flowsheet for treatment today, total treatment time and time spent performing 1:1 timed codes.

## 2018-12-10 ENCOUNTER — OFFICE VISIT (OUTPATIENT)
Dept: ALLERGY | Facility: CLINIC | Age: 53
End: 2018-12-10
Payer: COMMERCIAL

## 2018-12-10 VITALS
WEIGHT: 315 LBS | TEMPERATURE: 97.3 F | BODY MASS INDEX: 44.66 KG/M2 | HEART RATE: 62 BPM | SYSTOLIC BLOOD PRESSURE: 156 MMHG | DIASTOLIC BLOOD PRESSURE: 85 MMHG | OXYGEN SATURATION: 98 % | RESPIRATION RATE: 16 BRPM

## 2018-12-10 DIAGNOSIS — T78.1XXD ADVERSE REACTION TO FOOD, SUBSEQUENT ENCOUNTER: ICD-10-CM

## 2018-12-10 DIAGNOSIS — J30.89 ALLERGIC RHINITIS DUE TO MOLD: ICD-10-CM

## 2018-12-10 DIAGNOSIS — J30.81 ALLERGIC RHINITIS DUE TO ANIMALS: Primary | ICD-10-CM

## 2018-12-10 DIAGNOSIS — J30.1 SEASONAL ALLERGIC RHINITIS DUE TO POLLEN: ICD-10-CM

## 2018-12-10 PROBLEM — J30.9 CHRONIC ALLERGIC RHINITIS: Status: RESOLVED | Noted: 2018-03-07 | Resolved: 2018-12-10

## 2018-12-10 PROCEDURE — 99207 ZZC DROP WITH A PROCEDURE: CPT | Performed by: ALLERGY & IMMUNOLOGY

## 2018-12-10 PROCEDURE — 95004 PERQ TESTS W/ALRGNC XTRCS: CPT | Performed by: ALLERGY & IMMUNOLOGY

## 2018-12-10 NOTE — PATIENT INSTRUCTIONS
If you have any questions regarding your allergies, asthma, or what we discussed during your visit today please call the allergy clinic or contact us via [facility].    [facility]: [phone]      ENVIRONMENTAL PERCUTANEOUS SKIN TESTING: ADULT  [place] [facility] 12/10/2018   Consent Y   Ordering Physician Dr. Carlisle   Interpreting Physician Dr. Carlisle   Testing Technician Kelly Durant, RN   Location Back   Time start:  1:10 PM   Time End:  1:25 PM   Positive Control: Histatrol*ALK 1 mg/ml 5/12   Negative Control: 50% Glycerin 0   Cat Hair*ALK (10,000 BAU/ml) 7/15   AP Dog Hair/Dander (1:100 w/v) 6/12   Dust Mite p. 30,000 AU/ml 0   Dust Mite f. (30,000 AU/ml) 0   Mikael (W/F in millimeters) 0   Grass Mix #7 (100,000 BAU/ml) 10/25   Red Cedar (W/F in millimeters) 0   Maple/Chaves (W/F in millimeters) 8/15   Hackberry (W/F in millimeters) 0   White [place] (W/F in millimeters) 0   [place] *ALK (W/F in millimeters) 0   American Elm (W/F in millimeters) 0   Crawford (W/F in millimeters) 0   Black [place] (W/F in millimeters) 0   Birch Mix (W/F in millimeters) 0   [place] (W/F in millimeters) 0   Oak (W/F in millimeters) 0   Cocklebur (W/F in millimeters) 0   [place] (W/F in millimeters) 0   White Joe (W/F in millimeters) 0   Careless (W/F in millimeters) 0   Nettle (W/F in millimeters) 0   English Plantain (W/F in millimeters) 0   Kochia (W/F in millimeters) 0   Lamb's Quarter (W/F in millimeters) 0   Marshelder (W/F in millimeters) 0   Ragweed Mix* ALK (W/F in millimeters) 6/15   Russian Thistle (W/F in millimeters) 0   Sagebrush/Mugwort (W/F in millimeters) 4/12   Sheep Sorrel (W/F in millimeters) 0   Feather Mix* ALK (W/F in millimeters) 0   Penicillium Mix (1:10 w/v) 6/13   Curvularia spicifera (1:10 w/v) 8/17   Epicoccum (1:10 w/v) 7/15   Aspergillus fumigatus (1:10 w/v): 6/18   Alternaria tenius (1:10 w/v) 8/23 H. Cladosporium (1:10 w/v) 6/15   Phoma herbarum (1:10 w/v) 10/22       FOOD ALLERGEN PERCUTANEOUS SKIN TESTING  [employer]  12/10/2018   Consent Y   Ordering Physician  Dr. Carlisle   Interpreting Physician Dr. Carlisle   Testing Technician Kelly Durant, RN   Location Back   Time start:  1:10 PM   Time End:  1:25 PM   Shrimp 1:20 (W/F in millimeters) 0   Lobster 1:20 (W/F in millimeters) 0   Crab 1:20 (W/F in millimeters) 0   Clam 1:20 (W/F in millimeters) 0   Oyster 1:20 (W/F in millimeters) 0   Scallops 1:20 (W/F in millimeters) 0   [place]  1:20 (W/F in millimeters) 0        You may continue to avoid shellfish and salmon in your diet. If you are interested in re-introducing them in your diet give us a call to set up what is called a food challenge.    Continue to use the flonase (fluticasone) and claritin (loratadine) to manage your allergy symptoms. If your symptoms get worse schedule a follow-up to discuss other treatments.

## 2018-12-10 NOTE — LETTER
12/10/2018         RE: Geovany Marte  [address]  [place] [address]        Dear Colleague,    Thank you for referring your patient, Geovany Marte, to the [facility]. Please see a copy of my visit note below.    Geovany Marte was seen in the Allergy Clinic at [facility]. The following are my recommendations regarding his Allergic Rhinitis Due to Animals, Allergic Rhinitis Due to Pollen, Allergic Rhinitis Due to Mold and Adverse Reaction to Food    1. Continue loratadine 10mg daily as needed  2. Continue fluticasone nasal spray, 2 sprays in each nostril daily  3. May continue to avoid shellfish and salmon and consider scheduling oral food challenge if re-introduction is desired  4. Follow-up in the allergy clinic as needed  5. Patient to follow up with Primary Care provider regarding elevated blood pressure.      Geovany Marte is a 53 year old American male who is seen today for allergy testing. He has been feeling well and has not taken antihistamines in the past week.      Component      Latest Ref Rng & Units 11/20/2018   Allergen Clam      <0.10 KU(A)/L <0.10   Allergen Crab      <0.10 KU(A)/L <0.10   Allergen Lobster      <0.10 KU(A)/L <0.10   Allergen Oyster      <0.10 KU(A)/L <0.10   Allergen Scallop      <0.10 KU(A)/L <0.10   Allergen Shrimp      <0.10 KU(A)/L <0.10   Allergen, [place]      <0.10 KU(A)/L <0.10       Past Medical History:   Diagnosis Date     Anemia      Asthma      DM (diabetes mellitus) (H)      GERD (gastroesophageal reflux disease)      HTN      Hyperlipidemia      Morbid obesity (H)      Nuclear sclerosis 5/17/2013       REVIEW OF SYSTEMS:  General: negative for weight gain. negative for weight loss. negative for changes in sleep.   Eyes: negative for itching. negative for redness. negative for tearing/watering. negative for vision changes  Ears: negative for fullness. negative for hearing loss. negative for dizziness.   Nose: negative for  snoring.negative for changes in smell. negative for drainage.   Throat: negative for hoarseness. negative for sore throat. negative for trouble swallowing.   Lungs: negative for cough. negative for shortness of breath.negative for wheezing. negative for sputum production.   Cardiovascular: negative for chest pain. negative for swelling of ankles. negative for fast or irregular heartbeat.   Gastrointestinal: negative for nausea. negative for heartburn. negative for acid reflux.   Musculoskeletal: negative for joint pain. negative for joint stiffness. negative for joint swelling.   Neurologic: negative for seizures. negative for fainting. negative for weakness.   Psychiatric: negative for changes in mood. negative for anxiety.   Endocrine: negative for cold intolerance. negative for heat intolerance. negative for tremors.   Hematologic: negative for easy bruising. negative for easy bleeding.  Integumentary: negative for rash. negative for scaling. negative for nail changes.       Current Outpatient Medications:      albuterol (PROAIR HFA) 108 (90 BASE) MCG/ACT Inhaler, Inhale 2 puffs into the lungs every 4 hours as needed (for asthma symptoms), Disp: 1 Inhaler, Rfl: 2     amLODIPine (NORVASC) 10 MG tablet, Take 1 tablet (10 mg) by mouth daily, Disp: 90 tablet, Rfl: 1     ASPIRIN 81 MG PO TABS, 1 TABLET DAILY (*), Disp: , Rfl:      azelastine (OPTIVAR) 0.05 % ophthalmic solution, Apply 1 drop to eye 2 times daily, Disp: 1 Bottle, Rfl: 11     Calcium-Magnesium-Vitamin D (CITRACAL CALCIUM+D PO), 3 times daily. Take one tablet twice daily., Disp: , Rfl:      carvedilol (COREG) 25 MG tablet, Take 1 tablet (25 mg) by mouth 2 times daily (with meals), Disp: 180 tablet, Rfl: 3     EPINEPHrine (AUVI-Q) 0.3 MG/0.3ML injection 2-pack, Inject 0.3 mLs (0.3 mg) into the muscle as needed for anaphylaxis, Disp: 4 mL, Rfl: 1     fluticasone (FLONASE) 50 MCG/ACT spray, Spray 1-2 sprays into both nostrils daily, Disp: 16 g, Rfl: 11      glucose blood VI test strips (ONE TOUCH ULTRA TEST) strip, by In Vitro route 2 times daily., Disp: 1 Box, Rfl: 12     ORDER FOR DME, Injection Supplies for Vitamin B12: 3cc syringes w/ 27 gauge needles, 1/2 inch length, Disp: 12 each, Rfl: 0     pravastatin (PRAVACHOL) 80 MG tablet, Take 1 tablet (80 mg) by mouth every evening, Disp: 90 tablet, Rfl: 3     ACE/ARB NOT PRESCRIBED, INTENTIONAL,, continuous prn for other Reported on 4/3/2017, Disp: , Rfl:      diclofenac (VOLTAREN) 50 MG EC tablet, Take 1 tablet (50 mg) by mouth 3 times daily as needed for moderate pain (Patient not taking: Reported on 11/20/2018), Disp: 60 tablet, Rfl: 1     ORDER FOR DME, One touch glucometer with supplies to replace old meter for testing twice daily, Disp: 1 kit, Rfl: prn    EXAM:   /85   Pulse 62   Temp 97.3  F (36.3  C) (Oral)   Resp 16   Wt (!) 153.5 kg (338 lb 8 oz)   SpO2 98%   BMI 44.66 kg/m     GENERAL APPEARANCE: alert, cooperative and not in distress  SKIN: no rashes, no lesions  HEAD: atraumatic, normocephalic  MUSCULOSKELETAL: no musculoskeletal defects are noted  NEURO: no focal deficits noted  PSYCH: does not appear depressed or anxious      WORKUP:  Skin testing    ENVIRONMENTAL PERCUTANEOUS SKIN TESTING: ADULT  [facility] 12/10/2018   Consent Y   Ordering Physician Dr. Carlisle   Interpreting Physician Dr. Carlisle   Testing Technician Kelly Durant, RN   Location Back   Time start:  1:10 PM   Time End:  1:25 PM   Positive Control: Histatrol*ALK 1 mg/ml 5/12   Negative Control: 50% Glycerin 0   Cat Hair*ALK (10,000 BAU/ml) 7/15   AP Dog Hair/Dander (1:100 w/v) 6/12   Dust Mite p. 30,000 AU/ml 0   Dust Mite f. (30,000 AU/ml) 0   Mikael (W/F in millimeters) 0   Grass Mix #7 (100,000 BAU/ml) 10/25   Red Cedar (W/F in millimeters) 0   Maple/[place] (W/F in millimeters) 8/15   Hackberry (W/F in millimeters) 0   [place] (W/F in millimeters) 0   [place] *ALK (W/F in millimeters) 0   American Elm (W/F in  millimeters) 0   [place] (W/F in millimeters) 0   Black Strawberry (W/F in millimeters) 0   Birch Mix (W/F in millimeters) 0   [place] (W/F in millimeters) 0   Oak (W/F in millimeters) 0   Cocklebur (W/F in millimeters) 0   [place] (W/F in millimeters) 0   White Joe (W/F in millimeters) 0   Careless (W/F in millimeters) 0   Nettle (W/F in millimeters) 0   English Plantain (W/F in millimeters) 0   Kochia (W/F in millimeters) 0   Lamb's Quarter (W/F in millimeters) 0   Marshelder (W/F in millimeters) 0   Ragweed Mix* ALK (W/F in millimeters) 6/15   Russian Thistle (W/F in millimeters) 0   Sagebrush/Mugwort (W/F in millimeters) 4/12   Sheep Sorrel (W/F in millimeters) 0   Feather Mix* ALK (W/F in millimeters) 0   Penicillium Mix (1:10 w/v) 6/13   Curvularia spicifera (1:10 w/v) 8/17   Epicoccum (1:10 w/v) 7/15   Aspergillus fumigatus (1:10 w/v): 6/18   Alternaria tenius (1:10 w/v) 8/23   H. Cladosporium (1:10 w/v) 6/15   Phoma herbarum (1:10 w/v) 10/22      FOOD ALLERGEN PERCUTANEOUS SKIN TESTING  [facility]  12/10/2018   Consent Y   Ordering Physician  Dr. Carlisle   Interpreting Physician Dr. Carlisle   Testing Technician Kelly Durant, DALILA   Location Back   Time start:  1:10 PM   Time End:  1:25 PM   Shrimp 1:20 (W/F in millimeters) 0   Lobster 1:20 (W/F in millimeters) 0   Crab 1:20 (W/F in millimeters) 0   Clam 1:20 (W/F in millimeters) 0   Oyster 1:20 (W/F in millimeters) 0   Scallops 1:20 (W/F in millimeters) 0   [place]  1:20 (W/F in millimeters) 0        ASSESSMENT/PLAN:  Geovany Marte is a 53 year old male here for allergy testing. Skin testing was positive for sensitization to seasonal and perennial aeroallergens. He reports that his symptoms have improved since he was seen in clinic and that he has continued to use flonase. Skin testing was negative for sensitization to shellfish and salmon. He also had negative IgE testing to these foods. Geovany was counseled that based on these results, it is unlikely he has IgE  mediated food allergies. We reviewed the risks and benefits of re-introduction of these foods at home vs oral food challenge in clinic or continued avoidance. He wishes to continue avoiding at this time.    1. Continue loratadine 10mg daily as needed  2. Continue fluticasone nasal spray, 2 sprays in each nostril daily  3. May continue to avoid shellfish and salmon and consider scheduling oral food challenge if re-introduction is desired  4. Follow-up in the allergy clinic as needed  5. Patient to follow up with Primary Care provider regarding elevated blood pressure.      Citlalli Carlisle MD  Allergy/Immunology  [place], [place]      Chart documentation done in part with Dragon Voice Recognition Software. Although reviewed after completion, some word and grammatical errors may remain.      Again, thank you for allowing me to participate in the care of your patient.        Sincerely,        Citlalli Carlisle MD

## 2018-12-10 NOTE — PROGRESS NOTES
Geovany Marte was seen in the Allergy Clinic at [facility]. The following are my recommendations regarding his Allergic Rhinitis Due to Animals, Allergic Rhinitis Due to Pollen, Allergic Rhinitis Due to Mold and Adverse Reaction to Food    1. Continue loratadine 10mg daily as needed  2. Continue fluticasone nasal spray, 2 sprays in each nostril daily  3. May continue to avoid shellfish and salmon and consider scheduling oral food challenge if re-introduction is desired  4. Follow-up in the allergy clinic as needed  5. Patient to follow up with Primary Care provider regarding elevated blood pressure.      Geovany Marte is a 53 year old American male who is seen today for allergy testing. He has been feeling well and has not taken antihistamines in the past week.      Component      Latest Ref Rng & Units 11/20/2018   Allergen Clam      <0.10 KU(A)/L <0.10   Allergen Crab      <0.10 KU(A)/L <0.10   Allergen Lobster      <0.10 KU(A)/L <0.10   Allergen Oyster      <0.10 KU(A)/L <0.10   Allergen Scallop      <0.10 KU(A)/L <0.10   Allergen Shrimp      <0.10 KU(A)/L <0.10   Allergen, [place]      <0.10 KU(A)/L <0.10       Past Medical History:   Diagnosis Date     Anemia      Asthma      DM (diabetes mellitus) (H)      GERD (gastroesophageal reflux disease)      HTN      Hyperlipidemia      Morbid obesity (H)      Nuclear sclerosis 5/17/2013       REVIEW OF SYSTEMS:  General: negative for weight gain. negative for weight loss. negative for changes in sleep.   Eyes: negative for itching. negative for redness. negative for tearing/watering. negative for vision changes  Ears: negative for fullness. negative for hearing loss. negative for dizziness.   Nose: negative for snoring.negative for changes in smell. negative for drainage.   Throat: negative for hoarseness. negative for sore throat. negative for trouble swallowing.   Lungs: negative for cough. negative for shortness of breath.negative for wheezing. negative  for sputum production.   Cardiovascular: negative for chest pain. negative for swelling of ankles. negative for fast or irregular heartbeat.   Gastrointestinal: negative for nausea. negative for heartburn. negative for acid reflux.   Musculoskeletal: negative for joint pain. negative for joint stiffness. negative for joint swelling.   Neurologic: negative for seizures. negative for fainting. negative for weakness.   Psychiatric: negative for changes in mood. negative for anxiety.   Endocrine: negative for cold intolerance. negative for heat intolerance. negative for tremors.   Hematologic: negative for easy bruising. negative for easy bleeding.  Integumentary: negative for rash. negative for scaling. negative for nail changes.       Current Outpatient Medications:      albuterol (PROAIR HFA) 108 (90 BASE) MCG/ACT Inhaler, Inhale 2 puffs into the lungs every 4 hours as needed (for asthma symptoms), Disp: 1 Inhaler, Rfl: 2     amLODIPine (NORVASC) 10 MG tablet, Take 1 tablet (10 mg) by mouth daily, Disp: 90 tablet, Rfl: 1     ASPIRIN 81 MG PO TABS, 1 TABLET DAILY (*), Disp: , Rfl:      azelastine (OPTIVAR) 0.05 % ophthalmic solution, Apply 1 drop to eye 2 times daily, Disp: 1 Bottle, Rfl: 11     Calcium-Magnesium-Vitamin D (CITRACAL CALCIUM+D PO), 3 times daily. Take one tablet twice daily., Disp: , Rfl:      carvedilol (COREG) 25 MG tablet, Take 1 tablet (25 mg) by mouth 2 times daily (with meals), Disp: 180 tablet, Rfl: 3     EPINEPHrine (AUVI-Q) 0.3 MG/0.3ML injection 2-pack, Inject 0.3 mLs (0.3 mg) into the muscle as needed for anaphylaxis, Disp: 4 mL, Rfl: 1     fluticasone (FLONASE) 50 MCG/ACT spray, Spray 1-2 sprays into both nostrils daily, Disp: 16 g, Rfl: 11     glucose blood VI test strips (ONE TOUCH ULTRA TEST) strip, by In Vitro route 2 times daily., Disp: 1 Box, Rfl: 12     ORDER FOR DME, Injection Supplies for Vitamin B12: 3cc syringes w/ 27 gauge needles, 1/2 inch length, Disp: 12 each, Rfl: 0      pravastatin (PRAVACHOL) 80 MG tablet, Take 1 tablet (80 mg) by mouth every evening, Disp: 90 tablet, Rfl: 3     ACE/ARB NOT PRESCRIBED, INTENTIONAL,, continuous prn for other Reported on 4/3/2017, Disp: , Rfl:      diclofenac (VOLTAREN) 50 MG EC tablet, Take 1 tablet (50 mg) by mouth 3 times daily as needed for moderate pain (Patient not taking: Reported on 11/20/2018), Disp: 60 tablet, Rfl: 1     ORDER FOR DME, One touch glucometer with supplies to replace old meter for testing twice daily, Disp: 1 kit, Rfl: prn    EXAM:   /85   Pulse 62   Temp 97.3  F (36.3  C) (Oral)   Resp 16   Wt (!) 153.5 kg (338 lb 8 oz)   SpO2 98%   BMI 44.66 kg/m    GENERAL APPEARANCE: alert, cooperative and not in distress  SKIN: no rashes, no lesions  HEAD: atraumatic, normocephalic  MUSCULOSKELETAL: no musculoskeletal defects are noted  NEURO: no focal deficits noted  PSYCH: does not appear depressed or anxious      WORKUP:  Skin testing    ENVIRONMENTAL PERCUTANEOUS SKIN TESTING: ADULT  [facility] 12/10/2018   Consent Y   Ordering Physician Dr. Carlisle   Interpreting Physician Dr. Carlisle   Testing Technician Kelly Durant, RN   Location Back   Time start:  1:10 PM   Time End:  1:25 PM   Positive Control: Histatrol*ALK 1 mg/ml 5/12   Negative Control: 50% Glycerin 0   Cat Hair*ALK (10,000 BAU/ml) 7/15   AP Dog Hair/Dander (1:100 w/v) 6/12   Dust Mite p. 30,000 AU/ml 0   Dust Mite f. (30,000 AU/ml) 0   Mikael (W/F in millimeters) 0   Grass Mix #7 (100,000 BAU/ml) 10/25   Red Cedar (W/F in millimeters) 0   Maple/[place] (W/F in millimeters) 8/15   [place]berry (W/F in millimeters) 0   [place] (W/F in millimeters) 0   [place] *ALK (W/F in millimeters) 0   American Elm (W/F in millimeters) 0   [place] (W/F in millimeters) 0   [place] (W/F in millimeters) 0   Birch Mix (W/F in millimeters) 0   [place] (W/F in millimeters) 0   Oak (W/F in millimeters) 0   [place] (W/F in millimeters) 0   [place] (W/F in  millimeters) 0   [place] (W/F in millimeters) 0   Careless (W/F in millimeters) 0   Nettle (W/F in millimeters) 0   English Plantain (W/F in millimeters) 0   Kochia (W/F in millimeters) 0   Lamb's Quarter (W/F in millimeters) 0   Marshelder (W/F in millimeters) 0   Ragweed Mix* ALK (W/F in millimeters) 6/15   Russian Thistle (W/F in millimeters) 0   Sagebrush/Mugwort (W/F in millimeters) 4/12   Sheep Sorrel (W/F in millimeters) 0   Feather Mix* ALK (W/F in millimeters) 0   Penicillium Mix (1:10 w/v) 6/13   Curvularia spicifera (1:10 w/v) 8/17   Epicoccum (1:10 w/v) 7/15   Aspergillus fumigatus (1:10 w/v): 6/18   Alternaria tenius (1:10 w/v) 8/23   H. Cladosporium (1:10 w/v) 6/15   Phoma herbarum (1:10 w/v) 10/22      FOOD ALLERGEN PERCUTANEOUS SKIN TESTING  [facility]  12/10/2018   Consent Y   Ordering Physician  Dr. Carlisle   Interpreting Physician Dr. Carlisle   Testing Technician Kelly Durant RN   Location Back   Time start:  1:10 PM   Time End:  1:25 PM   Shrimp 1:20 (W/F in millimeters) 0   Lobster 1:20 (W/F in millimeters) 0   Crab 1:20 (W/F in millimeters) 0   Clam 1:20 (W/F in millimeters) 0   Oyster 1:20 (W/F in millimeters) 0   Scallops 1:20 (W/F in millimeters) 0   [place]  1:20 (W/F in millimeters) 0        ASSESSMENT/PLAN:  Geovany Marte is a 53 year old male here for allergy testing. Skin testing was positive for sensitization to seasonal and perennial aeroallergens. He reports that his symptoms have improved since he was seen in clinic and that he has continued to use flonase. Skin testing was negative for sensitization to shellfish and salmon. He also had negative IgE testing to these foods. Geovany was counseled that based on these results, it is unlikely he has IgE mediated food allergies. We reviewed the risks and benefits of re-introduction of these foods at home vs oral food challenge in clinic or continued avoidance. He wishes to continue avoiding at this time.    1. Continue loratadine 10mg  daily as needed  2. Continue fluticasone nasal spray, 2 sprays in each nostril daily  3. May continue to avoid shellfish and salmon and consider scheduling oral food challenge if re-introduction is desired  4. Follow-up in the allergy clinic as needed  5. Patient to follow up with Primary Care provider regarding elevated blood pressure.      Citlalli Carlisle MD  Allergy/Immunology  [employer] and [place], [place]      Chart documentation done in part with Dragon Voice Recognition Software. Although reviewed after completion, some word and grammatical errors may remain.

## 2018-12-17 ENCOUNTER — THERAPY VISIT (OUTPATIENT)
Dept: PHYSICAL THERAPY | Facility: CLINIC | Age: 53
End: 2018-12-17
Payer: COMMERCIAL

## 2018-12-17 DIAGNOSIS — M25.512 ACUTE PAIN OF LEFT SHOULDER: ICD-10-CM

## 2018-12-17 PROCEDURE — 97112 NEUROMUSCULAR REEDUCATION: CPT | Mod: GP | Performed by: PHYSICAL THERAPIST

## 2018-12-17 PROCEDURE — 97110 THERAPEUTIC EXERCISES: CPT | Mod: GP | Performed by: PHYSICAL THERAPIST

## 2019-03-14 ENCOUNTER — OFFICE VISIT (OUTPATIENT)
Dept: FAMILY MEDICINE | Facility: CLINIC | Age: 54
End: 2019-03-14
Payer: COMMERCIAL

## 2019-03-14 ENCOUNTER — TELEPHONE (OUTPATIENT)
Dept: FAMILY MEDICINE | Facility: CLINIC | Age: 54
End: 2019-03-14

## 2019-03-14 VITALS
HEIGHT: 73 IN | HEART RATE: 83 BPM | OXYGEN SATURATION: 99 % | SYSTOLIC BLOOD PRESSURE: 134 MMHG | TEMPERATURE: 98.7 F | DIASTOLIC BLOOD PRESSURE: 78 MMHG | BODY MASS INDEX: 41.75 KG/M2 | WEIGHT: 315 LBS

## 2019-03-14 DIAGNOSIS — R07.0 THROAT PAIN: ICD-10-CM

## 2019-03-14 DIAGNOSIS — I10 HYPERTENSION GOAL BP (BLOOD PRESSURE) < 140/90: ICD-10-CM

## 2019-03-14 DIAGNOSIS — B34.9 VIRAL ILLNESS: Primary | ICD-10-CM

## 2019-03-14 DIAGNOSIS — J45.20 INTERMITTENT ASTHMA, UNCOMPLICATED: ICD-10-CM

## 2019-03-14 DIAGNOSIS — E11.9 TYPE 2 DIABETES MELLITUS WITHOUT COMPLICATION, WITHOUT LONG-TERM CURRENT USE OF INSULIN (H): ICD-10-CM

## 2019-03-14 DIAGNOSIS — E78.5 HYPERLIPIDEMIA LDL GOAL <100: ICD-10-CM

## 2019-03-14 LAB
ANION GAP SERPL CALCULATED.3IONS-SCNC: 8 MMOL/L (ref 3–14)
BUN SERPL-MCNC: 11 MG/DL (ref 7–30)
CALCIUM SERPL-MCNC: 9.1 MG/DL (ref 8.5–10.1)
CHLORIDE SERPL-SCNC: 106 MMOL/L (ref 94–109)
CHOLEST SERPL-MCNC: 187 MG/DL
CO2 SERPL-SCNC: 25 MMOL/L (ref 20–32)
CREAT SERPL-MCNC: 0.97 MG/DL (ref 0.66–1.25)
CREAT UR-MCNC: 155 MG/DL
DEPRECATED S PYO AG THROAT QL EIA: NORMAL
GFR SERPL CREATININE-BSD FRML MDRD: 88 ML/MIN/{1.73_M2}
GLUCOSE SERPL-MCNC: 142 MG/DL (ref 70–99)
HBA1C MFR BLD: 5.7 % (ref 0–5.6)
HDLC SERPL-MCNC: 45 MG/DL
LDLC SERPL CALC-MCNC: 99 MG/DL
MICROALBUMIN UR-MCNC: 5 MG/L
MICROALBUMIN/CREAT UR: 3.45 MG/G CR (ref 0–17)
NONHDLC SERPL-MCNC: 142 MG/DL
POTASSIUM SERPL-SCNC: 3.7 MMOL/L (ref 3.4–5.3)
SODIUM SERPL-SCNC: 139 MMOL/L (ref 133–144)
SPECIMEN SOURCE: NORMAL
TRIGL SERPL-MCNC: 215 MG/DL

## 2019-03-14 PROCEDURE — 80048 BASIC METABOLIC PNL TOTAL CA: CPT | Performed by: FAMILY MEDICINE

## 2019-03-14 PROCEDURE — 82043 UR ALBUMIN QUANTITATIVE: CPT | Performed by: FAMILY MEDICINE

## 2019-03-14 PROCEDURE — 83036 HEMOGLOBIN GLYCOSYLATED A1C: CPT | Performed by: FAMILY MEDICINE

## 2019-03-14 PROCEDURE — 36415 COLL VENOUS BLD VENIPUNCTURE: CPT | Performed by: FAMILY MEDICINE

## 2019-03-14 PROCEDURE — 80061 LIPID PANEL: CPT | Performed by: FAMILY MEDICINE

## 2019-03-14 PROCEDURE — 87081 CULTURE SCREEN ONLY: CPT | Performed by: FAMILY MEDICINE

## 2019-03-14 PROCEDURE — 87880 STREP A ASSAY W/OPTIC: CPT | Performed by: FAMILY MEDICINE

## 2019-03-14 PROCEDURE — 99214 OFFICE O/P EST MOD 30 MIN: CPT | Performed by: FAMILY MEDICINE

## 2019-03-14 RX ORDER — PREDNISONE 20 MG/1
40 TABLET ORAL DAILY
Qty: 10 TABLET | Refills: 0 | Status: SHIPPED | OUTPATIENT
Start: 2019-03-14 | End: 2019-03-27

## 2019-03-14 RX ORDER — ALBUTEROL SULFATE 90 UG/1
2 AEROSOL, METERED RESPIRATORY (INHALATION) EVERY 4 HOURS PRN
Qty: 18 G | Refills: 0 | Status: SHIPPED | OUTPATIENT
Start: 2019-03-14 | End: 2019-03-14

## 2019-03-14 RX ORDER — AMLODIPINE BESYLATE 10 MG/1
10 TABLET ORAL DAILY
Qty: 90 TABLET | Refills: 1 | Status: SHIPPED | OUTPATIENT
Start: 2019-03-14 | End: 2019-10-30

## 2019-03-14 RX ORDER — ALBUTEROL SULFATE 90 UG/1
2 AEROSOL, METERED RESPIRATORY (INHALATION) EVERY 4 HOURS PRN
Qty: 8.5 G | Refills: 3 | Status: SHIPPED | OUTPATIENT
Start: 2019-03-14 | End: 2020-01-14

## 2019-03-14 RX ORDER — PRAVASTATIN SODIUM 80 MG/1
80 TABLET ORAL EVERY EVENING
Qty: 90 TABLET | Refills: 3 | Status: SHIPPED | OUTPATIENT
Start: 2019-03-14 | End: 2020-02-11

## 2019-03-14 ASSESSMENT — PAIN SCALES - GENERAL: PAINLEVEL: MILD PAIN (2)

## 2019-03-14 ASSESSMENT — MIFFLIN-ST. JEOR: SCORE: 2387.55

## 2019-03-14 NOTE — TELEPHONE ENCOUNTER
Reason for Call:  Other     Detailed comments: Pharmacy want to know if the patient is being prescribed 1 or 2 inhalers. Inhalers only comes in 8.5 grams per inhaler and the doctor ordered 18 grams.    Phone Number Patient can be reached at: [phone]    Best Time: anytime    Can we leave a detailed message on this number? YES    Call taken on 3/14/2019 at 2:17 PM by Mahendra Estrada

## 2019-03-14 NOTE — PROGRESS NOTES
SUBJECTIVE:   Geovany Marte is a 53 year old male who presents to clinic today for the following health issues:      Diabetes Follow-up      Patient is checking blood sugars: haven't checked in the last few days due to illness, but readings are around 100-120    Diabetic concerns: None     Symptoms of hypoglycemia (low blood sugar): none     Paresthesias (numbness or burning in feet) or sores: No     Date of last diabetic eye exam: 5-6 months ago    Diabetes Management Resources    Hyperlipidemia Follow-Up      Rate your low fat/cholesterol diet?: not monitoring fat    Taking statin?  Yes, no muscle aches from statin    Other lipid medications/supplements?:  none    Hypertension Follow-up      Outpatient blood pressures are being checked at home.  Results are 120's/70's    Low Salt Diet: not monitoring salt    BP Readings from Last 2 Encounters:   03/14/19 144/82   12/10/18 156/85     Hemoglobin A1C (%)   Date Value   10/03/2018 6.6 (H)   03/07/2018 6.2 (H)     LDL Cholesterol Calculated (mg/dL)   Date Value   03/07/2018 92   03/23/2017 113 (H)       Amount of exercise or physical activity: None    Problems taking medications regularly: No    Medication side effects: none    Diet: regular (no restrictions)      Acute Illness   Acute illness concerns: cough  Onset: 1 week ago    Fever: no     Chills/Sweats: no     Headache (location?): no     Sinus Pressure:YES    Conjunctivitis:  no    Ear Pain: no    Rhinorrhea: no     Congestion: YES    Sore Throat: YES     Cough: YES-productive of yellow sputum, productive of green sputum    Wheeze: no     Decreased Appetite: no     Nausea: no     Vomiting: no     Diarrhea:  no     Dysuria/Freq.: no     Fatigue/Achiness: no     Sick/Strep Exposure: no      Therapies Tried and outcome: Dayquil, cough drops, tea- little relief        Problem list and histories reviewed & adjusted, as indicated.  Additional history: as documented    Patient Active Problem List   Diagnosis      Hyperlipidemia LDL goal <100     Type 2 diabetes, HbA1c goal < 7% (H)     Disturbance of skin sensation     Intermittent asthma     Hypertension goal BP (blood pressure) < 140/90     Dry eye syndrome     Nuclear sclerosis     Erectile dysfunction     Glaucoma suspect     Gout     Seasonal allergic rhinitis     Morbid obesity (H)     Retrograde ejaculation     Bariatric surgery status     Plantar warts     Acute pain of left shoulder     Allergic rhinitis due to animals     Allergic rhinitis due to mold     Past Surgical History:   Procedure Laterality Date     ENT SURGERY      tonsils removed at 19 years old     ESOPHAGOSCOPY, GASTROSCOPY, DUODENOSCOPY (EGD), COMBINED N/A 3/18/2015    Procedure: COMBINED ESOPHAGOSCOPY, GASTROSCOPY, DUODENOSCOPY (EGD);  Surgeon: Jae Robles MD;  Location:  GI     LAPAROSCOPIC BYPASS GASTRIC  6/22/2011    Procedure:LAPAROSCOPIC BYPASS GASTRIC; Surgeon:HANNAH SHEPARD; Location: OR       Social History     Tobacco Use     Smoking status: Never Smoker     Smokeless tobacco: Never Used     Tobacco comment: 1994   Substance Use Topics     Alcohol use: Yes     Alcohol/week: 0.0 oz     Comment: Once a week.     Family History   Problem Relation Age of Onset     Cerebrovascular Disease Mother      Hypertension Mother      Diabetes Father      Hypertension Father      Heart Disease Brother      Breast Cancer Sister      Cancer Sister      Cerebrovascular Disease Brother      Breast Cancer Sister      Asthma Son      Thyroid Disease No family hx of      Glaucoma No family hx of      Macular Degeneration No family hx of            Reviewed and updated as needed this visit by clinical staff  Tobacco  Allergies  Meds  Problems  Med Hx  Surg Hx  Fam Hx       Reviewed and updated as needed this visit by Provider  Tobacco  Allergies  Meds  Problems  Med Hx  Surg Hx  Fam Hx         ROS:  Constitutional, HEENT, cardiovascular, pulmonary, gi and gu systems are negative,  "except as otherwise noted.    OBJECTIVE:     /78 (BP Location: Right arm)   Pulse 83   Temp 98.7  F (37.1  C) (Tympanic)   Ht 1.86 m (6' 1.23\")   Wt 149.5 kg (329 lb 9.6 oz)   SpO2 99%   BMI 43.22 kg/m    Body mass index is 43.22 kg/m .  GENERAL: healthy, alert, no distress and obese  EYES: Eyes grossly normal to inspection, PERRL and conjunctivae and sclerae normal  HENT: normal cephalic/atraumatic, ear canals and TM's normal, nasal mucosa edematous , oropharynx clear and oral mucous membranes moist  NECK: no adenopathy, no asymmetry, masses, or scars and thyroid normal to palpation  RESP: prolonged expiratory phase  CV: regular rate and rhythm, normal S1 S2, no S3 or S4, no murmur, click or rub, no peripheral edema and peripheral pulses strong  ABDOMEN: soft, nontender, no hepatosplenomegaly, no masses and bowel sounds normal  MS: no gross musculoskeletal defects noted, no edema  PSYCH: mentation appears normal, affect normal/bright    Diagnostic Test Results:  none     ASSESSMENT/PLAN:     1. Viral illness  Oral prednisone and symptomatic treatment  - predniSONE (DELTASONE) 20 MG tablet; Take 40 mg by mouth daily for 5 days.  Dispense: 10 tablet; Refill: 0    2. Throat pain    - Strep, Rapid Screen  - Beta strep group A culture    3. Type 2 diabetes mellitus without complication, without long-term current use of insulin (H)  Previously controlled - check labs and medication adjustment as indicated  - Hemoglobin A1c  - Albumin Random Urine Quantitative with Creat Ratio    4. Hyperlipidemia LDL goal <100  Check labs  - Lipid panel reflex to direct LDL Non-fasting    5. Hypertension goal BP (blood pressure) < 140/90  Controlled check labs.  - Basic metabolic panel    FUTURE APPOINTMENTS:       - Follow-up visit in 4 days if not improved or as determined by labs.    Mona Scales MD  [facility]"

## 2019-03-14 NOTE — TELEPHONE ENCOUNTER
Huddled with provider. Rx resent with correct dispense amount. No further questions or concerns. Closing encounter.     Dayan Durant RN    Outpatient Medication Detail      Disp Refills Start End HAYLEE   albuterol (PROAIR HFA) 108 (90 Base) MCG/ACT inhaler 8.5 g 3 3/14/2019  No   Sig - Route: Inhale 2 puffs into the lungs every 4 hours as needed (for asthma symptoms) - Inhalation   Sent to pharmacy as: albuterol (PROAIR HFA) 108 (90 Base) MCG/ACT inhaler   Class: E-Prescribe   Order: [id number]   E-Prescribing Status: Receipt confirmed by pharmacy (3/14/2019  2:40 PM CDT)

## 2019-03-14 NOTE — PATIENT INSTRUCTIONS
At [facility], we strive to deliver an exceptional experience to you, every time we see you.  If you receive a survey in the mail, please send us back your thoughts. We really do value your feedback.    Your care team:     Family Medicine   GABBIE Wright MD Emily Bunt, APRN CNP S. Matthew Hockett, MD Pamela Kolacz, MD Angela Wermerskirchen, MD         Clinic hours: Monday - Wednesday 7 am-7 pm   Thursdays and Fridays 7 am-5 pm.     [facility]: Monday - Friday 11 am-9 pm,   Saturday and Sunday 9 am-5 pm.    [facility]: Monday -Thursday 8 am-8 pm; Friday 8 am-6 pm; Saturday and Sunday 9 am-5 pm.     [facility]: Monday - Thursday 8 am - 7 pm; Friday 8 am - 6 pm    Clinic: [phone]   [facility]: [phone]   [facility]: [phone]

## 2019-03-15 LAB
BACTERIA SPEC CULT: NORMAL
SPECIMEN SOURCE: NORMAL

## 2019-03-15 NOTE — RESULT ENCOUNTER NOTE
Mr. Marte,    Your LDL (bad cholesterol)  was at goal. Your HDL (good cholesterol) was normal.  This is good. Your triglycerides were above normal.  Poor diet, genetics and being overweight can contribute to this.  1000mg daily of omega-3 fatty acids may improve this.  Your A1c was within the goal range for you. The goal for diabetics with other complications is less than 7. You should recheck your A1c in 6 months.  Your blood sugar was elevated. This is not unexpected for you given your diabetes. Maintaining a healthy weight is benificial to good blood sugar control.  Your kidney function was normal.  This should be rechecked every 6 months.  Your urine microalbumin test was normal.  This should be done yearly.    No strep on throat culture.    Please contact the clinic if you have additional questions.  Thank you.    Sincerely,    Mona Scales

## 2019-03-19 ENCOUNTER — DOCUMENTATION ONLY (OUTPATIENT)
Dept: OPHTHALMOLOGY | Facility: CLINIC | Age: 54
End: 2019-03-19

## 2019-03-22 ENCOUNTER — MYC MEDICAL ADVICE (OUTPATIENT)
Dept: FAMILY MEDICINE | Facility: CLINIC | Age: 54
End: 2019-03-22

## 2019-03-27 ENCOUNTER — OFFICE VISIT (OUTPATIENT)
Dept: FAMILY MEDICINE | Facility: CLINIC | Age: 54
End: 2019-03-27
Payer: COMMERCIAL

## 2019-03-27 VITALS
BODY MASS INDEX: 41.75 KG/M2 | OXYGEN SATURATION: 96 % | RESPIRATION RATE: 20 BRPM | TEMPERATURE: 98.8 F | SYSTOLIC BLOOD PRESSURE: 139 MMHG | WEIGHT: 315 LBS | HEIGHT: 73 IN | DIASTOLIC BLOOD PRESSURE: 86 MMHG | HEART RATE: 75 BPM

## 2019-03-27 DIAGNOSIS — J20.9 ACUTE BRONCHITIS, UNSPECIFIED ORGANISM: Primary | ICD-10-CM

## 2019-03-27 PROCEDURE — 99213 OFFICE O/P EST LOW 20 MIN: CPT | Performed by: FAMILY MEDICINE

## 2019-03-27 RX ORDER — BENZONATATE 200 MG/1
200 CAPSULE ORAL 3 TIMES DAILY PRN
Qty: 21 CAPSULE | Refills: 3 | Status: SHIPPED | OUTPATIENT
Start: 2019-03-27 | End: 2019-10-30

## 2019-03-27 RX ORDER — DOXYCYCLINE 100 MG/1
100 CAPSULE ORAL 2 TIMES DAILY
Qty: 20 CAPSULE | Refills: 0 | Status: SHIPPED | OUTPATIENT
Start: 2019-03-27 | End: 2019-04-06

## 2019-03-27 ASSESSMENT — PAIN SCALES - GENERAL: PAINLEVEL: NO PAIN (0)

## 2019-03-27 ASSESSMENT — MIFFLIN-ST. JEOR: SCORE: 2375.79

## 2019-03-27 NOTE — PATIENT INSTRUCTIONS
At [facility], we strive to deliver an exceptional experience to you, every time we see you.  If you receive a survey in the mail, please send us back your thoughts. We really do value your feedback.    Based on your medical history, these are the current health maintenance/preventive care services that you are due for (some may have been done at this visit.)  Health Maintenance Due   Topic Date Due     PREVENTIVE CARE VISIT  09/02/2011     ZOSTER IMMUNIZATION (1 of 2) 11/19/2013     ADVANCE DIRECTIVE PLANNING Q5 YRS  11/19/2018         Suggested websites for health information:  Www.Dovetail.Intalio : Up to date and easily searchable information on multiple topics.  Www.[place]esp[place].gov : medication info, interactive tutorials, watch real surgeries online  Www.familydoctor.org : good info from the Academy of Family Physicians  Www.cdc.gov : public health info, travel advisories, epidemics (H1N1)  Www.aap.org : children's health info, normal development, vaccinations  Www.health.[facility].[place].us : [place] dept of health, public health issues in [place], N1N1    Your care team:                            Family Medicine Internal Medicine   MD Gurpreet Howard MD Shantel Branch-Fleming, MD Katya Georgiev PA-C Nam Ho, MD Pediatrics   GABBIE Goodman, DILIP Avalos APRN CNP   MD Marge Willson MD Deborah Mielke, MD Kim Thein, APRN CNP      Clinic hours: Monday - Thursday 7 am-7 pm; Fridays 7 am-5 pm.   Urgent care: Monday - Friday 11 am-9 pm; Saturday and Sunday 9 am-5 pm.  Pharmacy : Monday -Thursday 8 am-8 pm; Friday 8 am-6 pm; Saturday and Sunday 9 am-5 pm.     Clinic: [phone]   Pharmacy: [phone]

## 2019-03-27 NOTE — PROGRESS NOTES
SUBJECTIVE:   Geovany Marte is a 53 year old male who presents to clinic today for the following health issues:      RESPIRATORY SYMPTOMS      Duration: 4-5 days    Description  nasal congestion, rhinorrhea, facial pain/pressure, cough, wheezing and chills    Severity: moderate    Accompanying signs and symptoms: None    History (predisposing factors):  asthma    Precipitating or alleviating factors: None    Therapies tried and outcome:  Finished prednisone, jayme seltzer plus      Problem list and histories reviewed & adjusted, as indicated.  Additional history: as documented    Patient Active Problem List   Diagnosis     Hyperlipidemia LDL goal <100     Type 2 diabetes, HbA1c goal < 7% (H)     Disturbance of skin sensation     Intermittent asthma     Hypertension goal BP (blood pressure) < 140/90     Dry eye syndrome     Nuclear sclerosis     Erectile dysfunction     Glaucoma suspect     Gout     Seasonal allergic rhinitis     Morbid obesity (H)     Retrograde ejaculation     Bariatric surgery status     Plantar warts     Acute pain of left shoulder     Allergic rhinitis due to animals     Allergic rhinitis due to mold     Past Surgical History:   Procedure Laterality Date     ENT SURGERY      tonsils removed at 19 years old     ESOPHAGOSCOPY, GASTROSCOPY, DUODENOSCOPY (EGD), COMBINED N/A 3/18/2015    Procedure: COMBINED ESOPHAGOSCOPY, GASTROSCOPY, DUODENOSCOPY (EGD);  Surgeon: Jae Robles MD;  Location:  GI     LAPAROSCOPIC BYPASS GASTRIC  6/22/2011    Procedure:LAPAROSCOPIC BYPASS GASTRIC; Surgeon:HANNAH SHPEARD; Location: OR       Social History     Tobacco Use     Smoking status: Never Smoker     Smokeless tobacco: Never Used     Tobacco comment: 1994   Substance Use Topics     Alcohol use: Yes     Alcohol/week: 0.0 oz     Comment: Once a week.     Family History   Problem Relation Age of Onset     Cerebrovascular Disease Mother      Hypertension Mother      Diabetes Father      Hypertension  "Father      Heart Disease Brother      Breast Cancer Sister      Cancer Sister      Cerebrovascular Disease Brother      Breast Cancer Sister      Asthma Son      Thyroid Disease No family hx of      Glaucoma No family hx of      Macular Degeneration No family hx of            Reviewed and updated as needed this visit by clinical staff  Tobacco  Allergies  Meds  Med Hx  Surg Hx  Fam Hx  Soc Hx      Reviewed and updated as needed this visit by Provider         ROS:  Constitutional, HEENT, cardiovascular, pulmonary, GI, , musculoskeletal, neuro, skin, endocrine and psych systems are negative, except as otherwise noted.    OBJECTIVE:     /86   Pulse 75   Temp 98.8  F (37.1  C) (Oral)   Resp 20   Ht 1.86 m (6' 1.23\")   Wt 148.3 kg (327 lb)   SpO2 96%   BMI 42.87 kg/m    Body mass index is 42.87 kg/m .  GENERAL: healthy, alert and no distress  NECK: no adenopathy, no asymmetry, masses, or scars and thyroid normal to palpation  RESP: lungs clear to auscultation - no rales, rhonchi or wheezes  CV: regular rate and rhythm, normal S1 S2, no S3 or S4, no murmur, click or rub, no peripheral edema and peripheral pulses strong  ABDOMEN: soft, nontender, no hepatosplenomegaly, no masses and bowel sounds normal  MS: no gross musculoskeletal defects noted, no edema    Diagnostic Test Results:  none     ASSESSMENT/PLAN:     1. Acute bronchitis, unspecified organism  Discussed likely viral. Discussed symptomatic cares. May use benzonatate as needed for coughing. Antibiotics rx given to patient to fill only if symptoms worsen in 5 days. Patient understands and agrees with plan.  - benzonatate (TESSALON) 200 MG capsule; Take 1 capsule (200 mg) by mouth 3 times daily as needed for cough  Dispense: 21 capsule; Refill: 3  - doxycycline monohydrate (MONODOX) 100 MG capsule; Take 1 capsule (100 mg) by mouth 2 times daily for 10 days  Dispense: 20 capsule; Refill: 0    See Patient Instructions    Lee Yin MD, " MD  [facility]

## 2019-04-23 ENCOUNTER — ANCILLARY PROCEDURE (OUTPATIENT)
Dept: GENERAL RADIOLOGY | Facility: CLINIC | Age: 54
End: 2019-04-23
Attending: PHYSICIAN ASSISTANT
Payer: COMMERCIAL

## 2019-04-23 ENCOUNTER — OFFICE VISIT (OUTPATIENT)
Dept: FAMILY MEDICINE | Facility: CLINIC | Age: 54
End: 2019-04-23
Payer: COMMERCIAL

## 2019-04-23 VITALS
SYSTOLIC BLOOD PRESSURE: 137 MMHG | TEMPERATURE: 98.8 F | DIASTOLIC BLOOD PRESSURE: 81 MMHG | WEIGHT: 315 LBS | BODY MASS INDEX: 41.75 KG/M2 | OXYGEN SATURATION: 98 % | HEIGHT: 73 IN | HEART RATE: 68 BPM | RESPIRATION RATE: 18 BRPM

## 2019-04-23 DIAGNOSIS — I10 HYPERTENSION GOAL BP (BLOOD PRESSURE) < 140/90: ICD-10-CM

## 2019-04-23 DIAGNOSIS — K21.00 GASTROESOPHAGEAL REFLUX DISEASE WITH ESOPHAGITIS: Primary | ICD-10-CM

## 2019-04-23 DIAGNOSIS — R07.89 BURNING CHEST PAIN: ICD-10-CM

## 2019-04-23 PROCEDURE — 99214 OFFICE O/P EST MOD 30 MIN: CPT | Performed by: PHYSICIAN ASSISTANT

## 2019-04-23 PROCEDURE — 71046 X-RAY EXAM CHEST 2 VIEWS: CPT

## 2019-04-23 PROCEDURE — 93000 ELECTROCARDIOGRAM COMPLETE: CPT | Performed by: PHYSICIAN ASSISTANT

## 2019-04-23 RX ORDER — CARVEDILOL 25 MG/1
25 TABLET ORAL 2 TIMES DAILY WITH MEALS
Qty: 180 TABLET | Refills: 3 | Status: SHIPPED | OUTPATIENT
Start: 2019-04-23 | End: 2020-04-10

## 2019-04-23 ASSESSMENT — MIFFLIN-ST. JEOR: SCORE: 2394.84

## 2019-04-23 ASSESSMENT — PAIN SCALES - GENERAL: PAINLEVEL: NO PAIN (0)

## 2019-04-23 NOTE — PROGRESS NOTES
SUBJECTIVE:   Geovany Marte is a 53 year old male who presents to clinic today for the following   health issues:    GERD/Heartburn  burring in epigastrium and chest. Shortness of breath gets worse on exertion, but no pain. Patient has asthma and feels shortness of breath might be due to asthma     Duration: 1 week persistent, but has been intermittent for over 3 months    Description (location/character/radiation): Middle of chest below nipple line- Burning sensation     Intensity:  moderate    Accompanying signs and symptoms:  food getting stuck: YES  nausea/vomiting/blood: no   abdominal pain: no   black/tarry or bloody stools: no :    History (similar episodes/previous evaluation): Yes    Precipitating or alleviating factors:  worse with no particular food or drink.  current NSAID/Aspirin use: YES    Therapies tried and outcome: antacids-help temporarily         Additional history: as documented    Reviewed  and updated as needed this visit by clinical staff  Tobacco  Allergies  Meds  Med Hx  Surg Hx  Fam Hx  Soc Hx        Reviewed and updated as needed this visit by Provider         Patient Active Problem List   Diagnosis     Hyperlipidemia LDL goal <100     Type 2 diabetes, HbA1c goal < 7% (H)     Disturbance of skin sensation     Intermittent asthma     Hypertension goal BP (blood pressure) < 140/90     Dry eye syndrome     Nuclear sclerosis     Erectile dysfunction     Glaucoma suspect     Gout     Seasonal allergic rhinitis     Morbid obesity (H)     Retrograde ejaculation     Bariatric surgery status     Plantar warts     Acute pain of left shoulder     Allergic rhinitis due to animals     Allergic rhinitis due to mold     Past Surgical History:   Procedure Laterality Date     ENT SURGERY      tonsils removed at 19 years old     ESOPHAGOSCOPY, GASTROSCOPY, DUODENOSCOPY (EGD), COMBINED N/A 3/18/2015    Procedure: COMBINED ESOPHAGOSCOPY, GASTROSCOPY, DUODENOSCOPY (EGD);  Surgeon: Jae Robles  MD Freddy;  [facility]     LAPAROSCOPIC BYPASS GASTRIC  6/22/2011    Procedure:LAPAROSCOPIC BYPASS GASTRIC; Surgeon:HANNAH SHEPARD; Location: OR       Social History     Tobacco Use     Smoking status: Never Smoker     Smokeless tobacco: Never Used     Tobacco comment: 1994   Substance Use Topics     Alcohol use: Yes     Alcohol/week: 0.0 oz     Comment: Once a week.     Family History   Problem Relation Age of Onset     Cerebrovascular Disease Mother      Hypertension Mother      Diabetes Father      Hypertension Father      Heart Disease Brother      Breast Cancer Sister      Cancer Sister      Cerebrovascular Disease Brother      Breast Cancer Sister      Asthma Son      Thyroid Disease No family hx of      Glaucoma No family hx of      Macular Degeneration No family hx of          Current Outpatient Medications   Medication Sig Dispense Refill     ACE/ARB NOT PRESCRIBED, INTENTIONAL, continuous prn for other Reported on 4/3/2017       albuterol (PROAIR HFA) 108 (90 Base) MCG/ACT inhaler Inhale 2 puffs into the lungs every 4 hours as needed (for asthma symptoms) 8.5 g 3     amLODIPine (NORVASC) 10 MG tablet Take 1 tablet (10 mg) by mouth daily 90 tablet 1     ASPIRIN 81 MG PO TABS 1 TABLET DAILY (*)       azelastine (OPTIVAR) 0.05 % ophthalmic solution Apply 1 drop to eye 2 times daily 1 Bottle 11     benzonatate (TESSALON) 200 MG capsule Take 1 capsule (200 mg) by mouth 3 times daily as needed for cough 21 capsule 3     Calcium-Magnesium-Vitamin D (CITRACAL CALCIUM+D PO) 3 times daily. Take one tablet twice daily.       carvedilol (COREG) 25 MG tablet Take 1 tablet (25 mg) by mouth 2 times daily (with meals) 180 tablet 3     EPINEPHrine (AUVI-Q) 0.3 MG/0.3ML injection 2-pack Inject 0.3 mLs (0.3 mg) into the muscle as needed for anaphylaxis 4 mL 1     fluticasone (FLONASE) 50 MCG/ACT spray Spray 1-2 sprays into both nostrils daily 16 g 11     glucose blood VI test strips (ONE TOUCH ULTRA TEST) strip by  "In Vitro route 2 times daily. 1 Box 12     omeprazole (PRILOSEC) 20 MG DR capsule Take 1 capsule (20 mg) by mouth daily 30 capsule 3     ORDER FOR DME Injection Supplies for Vitamin B12: 3cc syringes w/ 27 gauge needles, 1/2 inch length 12 each 0     ORDER FOR DME One touch glucometer with supplies to replace old meter for testing twice daily 1 kit prn     pravastatin (PRAVACHOL) 80 MG tablet Take 1 tablet (80 mg) by mouth every evening 90 tablet 3     Allergies   Allergen Reactions     Lisinopril Anaphylaxis     Shellfish Allergy Difficulty breathing     Throat closing up     Shellfish-Derived Products      Throat swells up, hard time breathing       ROS:  Constitutional, HEENT, cardiovascular, pulmonary, gi and gu systems are negative, except as otherwise noted.    OBJECTIVE:     /81 (BP Location: Right arm, Patient Position: Sitting, Cuff Size: Thigh)   Pulse 68   Temp 98.8  F (37.1  C) (Oral)   Resp 18   Ht 1.86 m (6' 1.23\")   Wt (!) 150.2 kg (331 lb 3.2 oz)   SpO2 98%   BMI 43.42 kg/m    Body mass index is 43.42 kg/m .  GENERAL: healthy, alert and no distress  NECK: no adenopathy, no asymmetry, masses, or scars and thyroid normal to palpation  RESP: lungs clear to auscultation - no rales, rhonchi or wheezes  CV: regular rate and rhythm, normal S1 S2, no S3 or S4, no murmur, click or rub, no peripheral edema and peripheral pulses strong  ABDOMEN: soft, nontender, no hepatosplenomegaly, no masses and bowel sounds normal  MS: no gross musculoskeletal defects noted, no edema    Diagnostic Test Results:  Results for orders placed or performed in visit on 03/14/19   Hemoglobin A1c   Result Value Ref Range    Hemoglobin A1C 5.7 (H) 0 - 5.6 %   Albumin Random Urine Quantitative with Creat Ratio   Result Value Ref Range    Creatinine Urine 155 mg/dL    Albumin Urine mg/L 5 mg/L    Albumin Urine mg/g Cr 3.45 0 - 17 mg/g Cr   Basic metabolic panel   Result Value Ref Range    Sodium 139 133 - 144 mmol/L    " Potassium 3.7 3.4 - 5.3 mmol/L    Chloride 106 94 - 109 mmol/L    Carbon Dioxide 25 20 - 32 mmol/L    Anion Gap 8 3 - 14 mmol/L    Glucose 142 (H) 70 - 99 mg/dL    Urea Nitrogen 11 7 - 30 mg/dL    Creatinine 0.97 0.66 - 1.25 mg/dL    GFR Estimate 88 >60 mL/min/[1.73_m2]    GFR Estimate If Black >90 >60 mL/min/[1.73_m2]    Calcium 9.1 8.5 - 10.1 mg/dL   Lipid panel reflex to direct LDL Non-fasting   Result Value Ref Range    Cholesterol 187 <200 mg/dL    Triglycerides 215 (H) <150 mg/dL    HDL Cholesterol 45 >39 mg/dL    LDL Cholesterol Calculated 99 <100 mg/dL    Non HDL Cholesterol 142 (H) <130 mg/dL   Strep, Rapid Screen   Result Value Ref Range    Specimen Description Throat     Rapid Strep A Screen       NEGATIVE: No Group A streptococcal antigen detected by immunoassay, await culture report.   Beta strep group A culture   Result Value Ref Range    Specimen Description Throat     Culture Micro No beta hemolytic Streptococcus Group A isolated      EKG - negative    ASSESSMENT/PLAN:               ICD-10-CM    1. Gastroesophageal reflux disease with esophagitis K21.0 omeprazole (PRILOSEC) 20 MG DR capsule   2. Hypertension goal BP (blood pressure) < 140/90 I10 carvedilol (COREG) 25 MG tablet   3. Burning chest pain R07.89 Echo Stress Test with Definity     EKG 12-lead complete w/read - Clinics     XR Chest 2 Views     1. Omeprazole 20 mg daily   2. Stable  Continue current medication  Patient will schedule stress test  If develops persistent chest pain, shortness of breath, lightheadedness, go to ED      Dianna Solorzano PA-C  [facility]

## 2019-04-24 ENCOUNTER — ANCILLARY PROCEDURE (OUTPATIENT)
Dept: CARDIOLOGY | Facility: CLINIC | Age: 54
End: 2019-04-24
Attending: PHYSICIAN ASSISTANT
Payer: COMMERCIAL

## 2019-04-24 DIAGNOSIS — R07.89 BURNING CHEST PAIN: ICD-10-CM

## 2019-04-24 PROCEDURE — 93352 ADMIN ECG CONTRAST AGENT: CPT | Performed by: INTERNAL MEDICINE

## 2019-04-24 PROCEDURE — 93350 STRESS TTE ONLY: CPT | Performed by: INTERNAL MEDICINE

## 2019-04-24 PROCEDURE — 93016 CV STRESS TEST SUPVJ ONLY: CPT | Performed by: INTERNAL MEDICINE

## 2019-04-24 PROCEDURE — 93018 CV STRESS TEST I&R ONLY: CPT | Performed by: INTERNAL MEDICINE

## 2019-04-24 PROCEDURE — 93325 DOPPLER ECHO COLOR FLOW MAPG: CPT | Performed by: INTERNAL MEDICINE

## 2019-04-24 PROCEDURE — 93017 CV STRESS TEST TRACING ONLY: CPT | Performed by: INTERNAL MEDICINE

## 2019-04-24 PROCEDURE — 93321 DOPPLER ECHO F-UP/LMTD STD: CPT | Performed by: INTERNAL MEDICINE

## 2019-04-24 RX ADMIN — Medication 5 ML: at 11:30

## 2019-07-14 DIAGNOSIS — K21.00 GASTROESOPHAGEAL REFLUX DISEASE WITH ESOPHAGITIS: ICD-10-CM

## 2019-07-14 NOTE — TELEPHONE ENCOUNTER
"Requested Prescriptions   Pending Prescriptions Disp Refills     omeprazole (PRILOSEC) 20 MG DR capsule [Pharmacy Med Name: OMEPRAZOLE DR 20 MG CAPSULE] 90 capsule 1     Sig: TAKE 1 CAPSULE BY MOUTH EVERY DAY       PPI Protocol Passed - 7/14/2019  7:31 AM        Passed - Not on Clopidogrel (unless Pantoprazole ordered)        Passed - No diagnosis of osteoporosis on record        Passed - Recent (12 mo) or future (30 days) visit within the authorizing provider's specialty     Patient had office visit in the last 12 months or has a visit in the next 30 days with authorizing provider or within the authorizing provider's specialty.  See \"Patient Info\" tab in inbasket, or \"Choose Columns\" in Meds & Orders section of the refill encounter.              Passed - Medication is active on med list        Passed - Patient is age 18 or older        Last Written Prescription Date:  4/23/19  Last Fill Quantity: 30,  # refills: 3   Last office visit: 4/23/2019 with prescribing provider:     Future Office Visit:      "

## 2019-07-16 NOTE — TELEPHONE ENCOUNTER
90 day supply approved. Prescription approved per [facility] Refill Protocol.      Ruby Locke RN, BSN, PHN

## 2019-10-28 DIAGNOSIS — I10 HYPERTENSION GOAL BP (BLOOD PRESSURE) < 140/90: ICD-10-CM

## 2019-10-28 NOTE — TELEPHONE ENCOUNTER
"Requested Prescriptions   Pending Prescriptions Disp Refills     amLODIPine (NORVASC) 10 MG tablet [Pharmacy Med Name: AMLODIPINE BESYLATE 10 MG TAB]  Last Written Prescription Date:  03/14/19  Last Fill Quantity: 90,  # refills: 1   Last Office Visit with [facility], [facility] or [facility] prescribing provider:  04/23/19Miesha   Future Office Visit:    Next 5 appointments (look out 90 days)    Oct 30, 2019  9:40 AM CDT  [facility] Visit [facility]a [facility]es MD  [facility] [address] [place] [phone]        90 tablet 1     Sig: TAKE 1 TABLET BY MOUTH EVERY DAY       Calcium Channel Blockers Protocol  Passed - 10/28/2019  2:27 AM        Passed - Blood pressure under 140/90 in past 12 months     BP Readings from Last 3 Encounters:   04/23/19 137/81   03/27/19 139/86   03/14/19 134/78                 Passed - Recent (12 mo) or future (30 days) visit within the authorizing provider's specialty     Patient has had an office visit with the authorizing provider or a provider within the authorizing providers department within the previous 12 mos or has a future within next 30 days. See \"Patient Info\" tab in inbasket, or \"Choose Columns\" in Meds & Orders section of the refill encounter.              Passed - Medication is active on med list        Passed - Patient is age 18 or older        Passed - Normal serum creatinine on file in past 12 months     Recent Labs   Lab Test 03/14/19  1345   CR 0.97               "

## 2019-10-30 ENCOUNTER — OFFICE VISIT (OUTPATIENT)
Dept: FAMILY MEDICINE | Facility: CLINIC | Age: 54
End: 2019-10-30
Payer: COMMERCIAL

## 2019-10-30 VITALS
SYSTOLIC BLOOD PRESSURE: 134 MMHG | WEIGHT: 315 LBS | DIASTOLIC BLOOD PRESSURE: 82 MMHG | BODY MASS INDEX: 41.75 KG/M2 | TEMPERATURE: 98.3 F | HEIGHT: 73 IN | HEART RATE: 64 BPM | RESPIRATION RATE: 18 BRPM | OXYGEN SATURATION: 99 %

## 2019-10-30 DIAGNOSIS — Z12.5 SCREENING FOR PROSTATE CANCER: ICD-10-CM

## 2019-10-30 DIAGNOSIS — I10 HYPERTENSION GOAL BP (BLOOD PRESSURE) < 140/90: ICD-10-CM

## 2019-10-30 DIAGNOSIS — E66.01 MORBID OBESITY (H): ICD-10-CM

## 2019-10-30 DIAGNOSIS — Z23 NEED FOR PROPHYLACTIC VACCINATION AND INOCULATION AGAINST INFLUENZA: ICD-10-CM

## 2019-10-30 DIAGNOSIS — J30.2 SEASONAL ALLERGIC RHINITIS, UNSPECIFIED TRIGGER: ICD-10-CM

## 2019-10-30 DIAGNOSIS — E11.9 TYPE 2 DIABETES, HBA1C GOAL < 7% (H): Primary | ICD-10-CM

## 2019-10-30 LAB
ANION GAP SERPL CALCULATED.3IONS-SCNC: 8 MMOL/L (ref 3–14)
BUN SERPL-MCNC: 8 MG/DL (ref 7–30)
CALCIUM SERPL-MCNC: 9.1 MG/DL (ref 8.5–10.1)
CHLORIDE SERPL-SCNC: 101 MMOL/L (ref 94–109)
CO2 SERPL-SCNC: 28 MMOL/L (ref 20–32)
CREAT SERPL-MCNC: 0.84 MG/DL (ref 0.66–1.25)
CREAT UR-MCNC: 228 MG/DL
GFR SERPL CREATININE-BSD FRML MDRD: >90 ML/MIN/{1.73_M2}
GLUCOSE SERPL-MCNC: 137 MG/DL (ref 70–99)
HBA1C MFR BLD: 6.3 % (ref 0–5.6)
MICROALBUMIN UR-MCNC: 8 MG/L
MICROALBUMIN/CREAT UR: 3.71 MG/G CR (ref 0–17)
POTASSIUM SERPL-SCNC: 4 MMOL/L (ref 3.4–5.3)
PSA SERPL-ACNC: 3.35 UG/L (ref 0–4)
SODIUM SERPL-SCNC: 137 MMOL/L (ref 133–144)
TSH SERPL DL<=0.005 MIU/L-ACNC: 0.59 MU/L (ref 0.4–4)

## 2019-10-30 PROCEDURE — 84443 ASSAY THYROID STIM HORMONE: CPT | Performed by: FAMILY MEDICINE

## 2019-10-30 PROCEDURE — 99214 OFFICE O/P EST MOD 30 MIN: CPT | Mod: 25 | Performed by: FAMILY MEDICINE

## 2019-10-30 PROCEDURE — 36415 COLL VENOUS BLD VENIPUNCTURE: CPT | Performed by: FAMILY MEDICINE

## 2019-10-30 PROCEDURE — 83036 HEMOGLOBIN GLYCOSYLATED A1C: CPT | Performed by: FAMILY MEDICINE

## 2019-10-30 PROCEDURE — G0103 PSA SCREENING: HCPCS | Performed by: FAMILY MEDICINE

## 2019-10-30 PROCEDURE — 90471 IMMUNIZATION ADMIN: CPT | Performed by: FAMILY MEDICINE

## 2019-10-30 PROCEDURE — 90686 IIV4 VACC NO PRSV 0.5 ML IM: CPT | Performed by: FAMILY MEDICINE

## 2019-10-30 PROCEDURE — 80048 BASIC METABOLIC PNL TOTAL CA: CPT | Performed by: FAMILY MEDICINE

## 2019-10-30 PROCEDURE — 82043 UR ALBUMIN QUANTITATIVE: CPT | Performed by: FAMILY MEDICINE

## 2019-10-30 RX ORDER — AMLODIPINE BESYLATE 10 MG/1
TABLET ORAL
Qty: 90 TABLET | Refills: 1 | OUTPATIENT
Start: 2019-10-30

## 2019-10-30 RX ORDER — AMLODIPINE BESYLATE 10 MG/1
10 TABLET ORAL DAILY
Qty: 90 TABLET | Refills: 1 | Status: SHIPPED | OUTPATIENT
Start: 2019-10-30 | End: 2020-04-16

## 2019-10-30 RX ORDER — FLUTICASONE PROPIONATE 50 MCG
1-2 SPRAY, SUSPENSION (ML) NASAL DAILY
Qty: 16 G | Refills: 11 | Status: SHIPPED | OUTPATIENT
Start: 2019-10-30 | End: 2020-10-13

## 2019-10-30 ASSESSMENT — MIFFLIN-ST. JEOR: SCORE: 2389.4

## 2019-10-30 NOTE — PATIENT INSTRUCTIONS
At [facility], we strive to deliver an exceptional experience to you, every time we see you. If you receive a survey, please complete it as we do value your feedback.  If you have MyChart, you can expect to receive results automatically within 24 hours of their completion.  Your provider will send a note interpreting your results as well.   If you do not have MyChart, you should receive your results in about a week by mail.    Your care team:     Family Medicine   GABBIE Wright MD Emily Bunt, GEOVANNI CHERRY   S. MD Inocencia Montoya MD Angela Wermerskirchen, MD    Internal Medicine  Adonis Albright MD coming 2020     Clinic hours: Monday - Wednesday 7 am-7 pm   Thursdays and Fridays 7 am-5 pm.     [facility]: Monday - Friday 11 am-9 pm,   Saturday and Sunday 9 am-5 pm.    [facility]: Monday -Thursday 8 am-8 pm; Friday 8 am-6 pm; Saturday and Sunday 9 am-5 pm.     [facility]: Monday - Thursday 8 am - 7 pm; Friday 8 am - 6 pm    Clinic: [phone]   [facility]: [phone] m [facility]: [phone]

## 2019-10-30 NOTE — PROGRESS NOTES
Subjective     Geovany Marte is a 53 year old male who presents to clinic today for the following health issues:    HPI   Diabetes Follow-up      How often are you checking your blood sugar? Have not checked in a couple of days    What concerns do you have today about your diabetes? None     Do you have any of these symptoms? (Select all that apply)  No numbness or tingling in feet.  No redness, sores or blisters on feet.  No complaints of excessive thirst.  No reports of blurry vision.  No significant changes to weight.     Have you had a diabetic eye exam in the last 12 months? No, he is due. He will make an appt today.     How many servings of fruits and vegetables do you eat daily?  0-1    On average, how many sweetened beverages do you drink each day (soda, juice, sweet tea, etc)?   He drinks tea a lot - adding sugar daily.     How many days per week do you miss taking your medication? 0    BP Readings from Last 2 Encounters:   10/30/19 134/82   04/23/19 137/81     Hemoglobin A1C (%)   Date Value   10/30/2019 6.3 (H)   03/14/2019 5.7 (H)     LDL Cholesterol Calculated (mg/dL)   Date Value   03/14/2019 99   03/07/2018 92       Diabetes Management Resources      Hypertension Follow-up      Do you check your blood pressure regularly outside of the clinic? Yes     Are you following a low salt diet? Yes    Are your blood pressures ever more than 140 on the top number (systolic) OR more   than 90 on the bottom number (diastolic), for example 140/90? No    Allergies - using flonase every other day.  Using generic claritin D every night to help with nasal congestion while sleeping for the last few weeks.  Has noticed some morning dribbling as well.      Reviewed and updated as needed this visit by Provider  Tobacco  Allergies  Meds  Problems  Med Hx  Surg Hx  Fam Hx         Review of Systems   ROS COMP: Constitutional, HEENT, cardiovascular, pulmonary, gi and gu systems are negative, except as otherwise noted.     "  Objective    /82 (BP Location: Right arm)   Pulse 64   Temp 98.3  F (36.8  C) (Oral)   Resp 18   Ht 1.86 m (6' 1.23\")   Wt 149.7 kg (330 lb)   SpO2 99%   BMI 43.27 kg/m    Body mass index is 43.27 kg/m .  Physical Exam   GENERAL: healthy, alert, no distress and obese  HENT: normal cephalic/atraumatic, ear canals and TM's normal, nasal mucosa edematous , oropharynx clear and oral mucous membranes moist  NECK: no adenopathy, no asymmetry, masses, or scars and thyroid normal to palpation  RESP: lungs clear to auscultation - no rales, rhonchi or wheezes  CV: regular rate and rhythm, normal S1 S2, no S3 or S4, no murmur, click or rub, no peripheral edema and peripheral pulses strong  ABDOMEN: soft, nontender, no hepatosplenomegaly, no masses and bowel sounds normal  MS: no gross musculoskeletal defects noted, no edema  SKIN: no suspicious lesions or rashes  PSYCH: mentation appears normal, affect normal/bright  Diabetic foot exam: normal DP and PT pulses, no trophic changes or ulcerative lesions, normal sensory exam and normal monofilament exam    Diagnostic Test Results:  Labs reviewed in Epic        Assessment & Plan     1. Type 2 diabetes, HbA1c goal < 7% (H)  Previously controled - check labs and will adjust treatment as needed  - HEMOGLOBIN A1C  - BASIC METABOLIC PANEL  - TSH with free T4 reflex  - Albumin Random Urine Quantitative with Creat Ratio  - blood glucose monitoring (NO BRAND SPECIFIED) meter device kit; Use to test blood sugar 1 times daily or as directed.  Dispense: 1 kit; Refill: 0  - blood glucose (NO BRAND SPECIFIED) lancets standard; Use to test blood sugar 1 times daily or as directed.  Dispense: 100 each; Refill: 3  - blood glucose (NO BRAND SPECIFIED) test strip; Use to test blood sugar 1 times daily or as directed.  Dispense: 100 each; Refill: 3    2. Hypertension goal BP (blood pressure) < 140/90  Controlled - refill and advised to start claritin d as soon as able.  Decrease carbs " "and sugar.  - amLODIPine (NORVASC) 10 MG tablet; Take 1 tablet (10 mg) by mouth daily  Dispense: 90 tablet; Refill: 1    3. Morbid obesity (H)  Low carb diet.    4. Seasonal allergic rhinitis, unspecified trigger  Start using 2 sprays daily and stop claritin D after 3 - 7 days.  Okay to mychart if not improved in 1 week.  - fluticasone (FLONASE) 50 MCG/ACT nasal spray; Spray 1-2 sprays into both nostrils daily  Dispense: 16 g; Refill: 11    5. Need for prophylactic vaccination and inoculation against influenza    - INFLUENZA VACCINE IM > 6 MONTHS VALENT IIV4 [05413]  - Vaccine Administration, Initial [59851]    6. Screening for prostate cancer  screening  - PSA, screen     BMI:   Estimated body mass index is 43.27 kg/m  as calculated from the following:    Height as of this encounter: 1.86 m (6' 1.23\").    Weight as of this encounter: 149.7 kg (330 lb).   Weight management plan: Discussed healthy diet and exercise guidelines    The uses and side effects, including black box warnings as appropriate, were discussed in detail.  All patient questions were answered.  The patient was instructed to call immediately if any side effects developed.     Return in about 6 months (around 4/30/2020).    Mona Scales MD  [facility]      "

## 2019-10-30 NOTE — TELEPHONE ENCOUNTER
Seen today and filled at the appointment. Medication refused.    Beatriz Frias RN, [facility] Triage

## 2019-10-31 ASSESSMENT — ASTHMA QUESTIONNAIRES: ACT_TOTALSCORE: 22

## 2019-11-01 NOTE — RESULT ENCOUNTER NOTE
Mr. Marte,    All of your labs were normal for you except your diabetes has worsened.  Decrease your sugar intake as we discussed.  Follow up in 6 months for a recheck.    Please contact the clinic if you have additional questions.  Thank you.    Sincerely,    Mona Scales MD

## 2019-11-22 PROBLEM — M25.512 ACUTE PAIN OF LEFT SHOULDER: Status: RESOLVED | Noted: 2018-11-19 | Resolved: 2019-11-22

## 2019-11-22 NOTE — PROGRESS NOTES
DISCHARGE SUMMARY    Geovany Marte was seen 2 times for evaluation and treatment.  Patient did not return for further treatment and current status is unknown.  Due to short treatment duration, no objective or functional changes were made.  Please see goal flow sheet from episode noted date below and initial evaluation for further information.  Patient is discharged from therapy and therapy episode is resolved as of 11/22/19.      No linked episodes

## 2019-12-01 DIAGNOSIS — E11.9 TYPE 2 DIABETES, HBA1C GOAL < 7% (H): ICD-10-CM

## 2019-12-01 NOTE — TELEPHONE ENCOUNTER
"Requested Prescriptions   Pending Prescriptions Disp Refills     Blood Glucose Monitoring Suppl (ACCU-CHEK GUIDE ME) w/Device KIT [Pharmacy Med Name: ACCU-CHEK GUIDE ME GLUCOSE MTR]  Last Written Prescription Date:  10/30/19  Last Fill Quantity: 1,  # refills: 0   Last Office Visit with [facility] or [facility] prescribing provider:  10/30/19   Future Office Visit:       0     Sig: USE TO TEST BLOOD SUGAR 1 TIMES DAILY OR AS DIRECTED       Diabetic Supplies Protocol Passed - 12/1/2019 10:09 AM        Passed - Medication is active on med list        Passed - Patient is 18 years of age or older        Passed - Recent (6 mo) or future (30 days) visit within the authorizing provider's specialty     Patient had office visit in the last 6 months or has a visit in the next 30 days with authorizing provider.  See \"Patient Info\" tab in inbasket, or \"Choose Columns\" in Meds & Orders section of the refill encounter.              "

## 2019-12-03 RX ORDER — BLOOD-GLUCOSE METER
EACH MISCELLANEOUS
Qty: 1 KIT | Refills: 0 | Status: SHIPPED | OUTPATIENT
Start: 2019-12-03

## 2019-12-03 NOTE — TELEPHONE ENCOUNTER
Prescription approved per [facility] Refill Protocol.  Zeinab Lockwood RN  [facility]

## 2020-01-11 DIAGNOSIS — J45.20 INTERMITTENT ASTHMA, UNCOMPLICATED: ICD-10-CM

## 2020-01-11 NOTE — TELEPHONE ENCOUNTER
"Requested Prescriptions   Pending Prescriptions Disp Refills     albuterol (PROAIR HFA) 108 (90 Base) MCG/ACT inhaler 8.5 g 3     Sig: Inhale 2 puffs into the lungs every 4 hours as needed (for asthma symptoms)  Last Written Prescription Date:  3/14/19  Last Fill Quantity: 8.5,  # refills: 3   Last Office Visit with [facility], [facility] or [facility] prescribing provider:  10/30/19   Future Office Visit:            Asthma Maintenance Inhalers - Anticholinergics Passed - 1/11/2020  1:08 PM        Passed - Patient is age 12 years or older        Passed - Asthma control assessment score within normal limits in last 6 months     Please review ACT score.           Passed - Medication is active on med list        Passed - Recent (6 mo) or future (30 days) visit within the authorizing provider's specialty     Patient had office visit in the last 6 months or has a visit in the next 30 days with authorizing provider or within the authorizing provider's specialty.  See \"Patient Info\" tab in inbasket, or \"Choose Columns\" in Meds & Orders section of the refill encounter.              "

## 2020-01-14 RX ORDER — ALBUTEROL SULFATE 90 UG/1
2 AEROSOL, METERED RESPIRATORY (INHALATION) EVERY 4 HOURS PRN
Qty: 8.5 G | Refills: 1 | Status: SHIPPED | OUTPATIENT
Start: 2020-01-14 | End: 2020-08-13

## 2020-01-14 NOTE — TELEPHONE ENCOUNTER
Prescription [place] per [facility] [place].    Beatriz Frias RN, [facility]

## 2020-02-09 DIAGNOSIS — E78.5 HYPERLIPIDEMIA LDL GOAL <100: ICD-10-CM

## 2020-02-09 NOTE — TELEPHONE ENCOUNTER
"Requested Prescriptions   Pending Prescriptions Disp Refills     pravastatin (PRAVACHOL) 80 MG tablet [Pharmacy Med Name: PRAVASTATIN SODIUM 80 MG TAB]  Last Written Prescription Date:  3/14/19  Last Fill Quantity: 90,  # refills: 3   Last office visit: 10/30/2019 with prescribing provider:  Tiffanie Scales   Future Office Visit:     90 tablet 2     Sig: TAKE 1 TABLET (80 MG) BY MOUTH EVERY EVENING       Statins Protocol Passed - 2/9/2020  9:33 AM        Passed - LDL on file in past 12 months     Recent Labs   Lab Test 03/14/19  1345   LDL 99             Passed - No abnormal creatine kinase in past 12 months     No lab results found.             Passed - Recent (12 mo) or future (30 days) visit within the authorizing provider's specialty     Patient has had an office visit with the authorizing provider or a provider within the authorizing providers department within the previous 12 mos or has a future within next 30 days. See \"Patient Info\" tab in inbasket, or \"Choose Columns\" in Meds & Orders section of the refill encounter.              Passed - Medication is active on med list        Passed - Patient is age 18 or older          "

## 2020-02-11 RX ORDER — PRAVASTATIN SODIUM 80 MG/1
80 TABLET ORAL EVERY EVENING
Qty: 90 TABLET | Refills: 0 | Status: SHIPPED | OUTPATIENT
Start: 2020-02-11 | End: 2020-04-16

## 2020-02-11 NOTE — TELEPHONE ENCOUNTER
Prescription approved per [facility] Refill Protocol.    Bri Ratliff RN

## 2020-02-24 ENCOUNTER — HEALTH MAINTENANCE LETTER (OUTPATIENT)
Age: 55
End: 2020-02-24

## 2020-04-09 DIAGNOSIS — I10 HYPERTENSION GOAL BP (BLOOD PRESSURE) < 140/90: ICD-10-CM

## 2020-04-09 NOTE — TELEPHONE ENCOUNTER
"Requested Prescriptions   Pending Prescriptions Disp Refills     carvedilol (COREG) 25 MG tablet [Pharmacy Med Name: CARVEDILOL 25 MG TABLET]  Last Written Prescription Date:  04/23/19  Last Fill Quantity: 180,  # refills: 3   Last Office Visit with [facility], [facility] or [facility] prescribing provider:  10/30/19-[facility]   Future Office Visit:    180 tablet 3     Sig: TAKE 1 TABLET (25 MG) BY MOUTH 2 TIMES DAILY (WITH MEALS)       Beta-Blockers Protocol Passed - 4/9/2020  8:59 AM        Passed - Blood pressure under 140/90 in past 12 months     BP Readings from Last 3 Encounters:   10/30/19 134/82   04/23/19 137/81   03/27/19 139/86                 Passed - Patient is age 6 or older        Passed - Recent (12 mo) or future (30 days) visit within the authorizing provider's specialty     Patient has had an office visit with the authorizing provider or a provider within the authorizing providers department within the previous 12 mos or has a future within next 30 days. See \"Patient Info\" tab in inbasket, or \"Choose Columns\" in Meds & Orders section of the refill encounter.              Passed - Medication is active on med list             "

## 2020-04-10 RX ORDER — CARVEDILOL 25 MG/1
25 TABLET ORAL 2 TIMES DAILY WITH MEALS
Qty: 180 TABLET | Refills: 0 | Status: SHIPPED | OUTPATIENT
Start: 2020-04-10 | End: 2020-07-06

## 2020-04-10 NOTE — TELEPHONE ENCOUNTER
Prescription approved per [facility] Refill Protocol.    Brie Proctor RN  [facility]

## 2020-04-16 ENCOUNTER — VIRTUAL VISIT (OUTPATIENT)
Dept: FAMILY MEDICINE | Facility: CLINIC | Age: 55
End: 2020-04-16
Payer: COMMERCIAL

## 2020-04-16 VITALS — DIASTOLIC BLOOD PRESSURE: 84 MMHG | SYSTOLIC BLOOD PRESSURE: 136 MMHG

## 2020-04-16 DIAGNOSIS — E66.01 MORBID OBESITY (H): ICD-10-CM

## 2020-04-16 DIAGNOSIS — J02.9 SORE THROAT: ICD-10-CM

## 2020-04-16 DIAGNOSIS — J30.1 SEASONAL ALLERGIC RHINITIS DUE TO POLLEN: ICD-10-CM

## 2020-04-16 DIAGNOSIS — E11.9 TYPE 2 DIABETES, HBA1C GOAL < 7% (H): ICD-10-CM

## 2020-04-16 DIAGNOSIS — I10 HYPERTENSION GOAL BP (BLOOD PRESSURE) < 140/90: ICD-10-CM

## 2020-04-16 DIAGNOSIS — E78.5 HYPERLIPIDEMIA LDL GOAL <100: ICD-10-CM

## 2020-04-16 DIAGNOSIS — E11.9 TYPE 2 DIABETES, HBA1C GOAL < 7% (H): Primary | ICD-10-CM

## 2020-04-16 LAB
ANION GAP SERPL CALCULATED.3IONS-SCNC: 3 MMOL/L (ref 3–14)
BUN SERPL-MCNC: 10 MG/DL (ref 7–30)
CALCIUM SERPL-MCNC: 8.9 MG/DL (ref 8.5–10.1)
CHLORIDE SERPL-SCNC: 106 MMOL/L (ref 94–109)
CHOLEST SERPL-MCNC: 212 MG/DL
CO2 SERPL-SCNC: 29 MMOL/L (ref 20–32)
CREAT SERPL-MCNC: 0.85 MG/DL (ref 0.66–1.25)
ERYTHROCYTE [DISTWIDTH] IN BLOOD BY AUTOMATED COUNT: 15.5 % (ref 10–15)
GFR SERPL CREATININE-BSD FRML MDRD: >90 ML/MIN/{1.73_M2}
GLUCOSE SERPL-MCNC: 125 MG/DL (ref 70–99)
HBA1C MFR BLD: 6.2 % (ref 0–5.6)
HCT VFR BLD AUTO: 42 % (ref 40–53)
HDLC SERPL-MCNC: 43 MG/DL
HGB BLD-MCNC: 13.4 G/DL (ref 13.3–17.7)
LDLC SERPL CALC-MCNC: 119 MG/DL
MCH RBC QN AUTO: 25.8 PG (ref 26.5–33)
MCHC RBC AUTO-ENTMCNC: 31.9 G/DL (ref 31.5–36.5)
MCV RBC AUTO: 81 FL (ref 78–100)
NONHDLC SERPL-MCNC: 169 MG/DL
PLATELET # BLD AUTO: 271 10E9/L (ref 150–450)
POTASSIUM SERPL-SCNC: 3.8 MMOL/L (ref 3.4–5.3)
RBC # BLD AUTO: 5.19 10E12/L (ref 4.4–5.9)
SODIUM SERPL-SCNC: 138 MMOL/L (ref 133–144)
TRIGL SERPL-MCNC: 252 MG/DL
WBC # BLD AUTO: 10.7 10E9/L (ref 4–11)

## 2020-04-16 PROCEDURE — 83036 HEMOGLOBIN GLYCOSYLATED A1C: CPT | Performed by: FAMILY MEDICINE

## 2020-04-16 PROCEDURE — 36415 COLL VENOUS BLD VENIPUNCTURE: CPT | Performed by: FAMILY MEDICINE

## 2020-04-16 PROCEDURE — 99214 OFFICE O/P EST MOD 30 MIN: CPT | Mod: 95 | Performed by: FAMILY MEDICINE

## 2020-04-16 PROCEDURE — 85027 COMPLETE CBC AUTOMATED: CPT | Performed by: FAMILY MEDICINE

## 2020-04-16 PROCEDURE — 80061 LIPID PANEL: CPT | Performed by: FAMILY MEDICINE

## 2020-04-16 PROCEDURE — 80048 BASIC METABOLIC PNL TOTAL CA: CPT | Performed by: FAMILY MEDICINE

## 2020-04-16 RX ORDER — AMLODIPINE BESYLATE 10 MG/1
10 TABLET ORAL DAILY
Qty: 90 TABLET | Refills: 1 | Status: SHIPPED | OUTPATIENT
Start: 2020-04-16 | End: 2020-10-13

## 2020-04-16 RX ORDER — PRAVASTATIN SODIUM 80 MG/1
80 TABLET ORAL EVERY EVENING
Qty: 90 TABLET | Refills: 3 | Status: SHIPPED | OUTPATIENT
Start: 2020-04-16 | End: 2021-04-27

## 2020-04-16 ASSESSMENT — PAIN SCALES - GENERAL: PAINLEVEL: NO PAIN (0)

## 2020-04-16 NOTE — PATIENT INSTRUCTIONS
Increase flonase to two sprays to each nostril daily.  Start non sudafed allergy medication daily.  Stop other over the counter symptoms medications.    Let me know if not improved in 1 week.

## 2020-04-16 NOTE — PROGRESS NOTES
"Geovany Marte is a 54 year old male who is being evaluated via a billable telephone visit.      The patient has been notified of following:     \"This telephone visit will be conducted via a call between you and your physician/provider. We have found that certain health care needs can be provided without the need for a physical exam.  This service lets us provide the care you need with a short phone conversation.  If a prescription is necessary we can send it directly to your pharmacy.  If lab work is needed we can place an order for that and you can then stop by our lab to have the test done at a later time.    Telephone visits are billed at different rates depending on your insurance coverage. During this emergency period, for some insurers they may be billed the same as an in-person visit.  Please reach out to your insurance provider with any questions.    If during the course of the call the physician/provider feels a telephone visit is not appropriate, you will not be charged for this service.\"    Patient has given verbal consent for Telephone visit?  Yes    How would you like to obtain your AVS? Cristal Pinedo     Geovany Marte is a 54 year old male who presents to clinic today for the following health issues:    Diabetes Follow-up      How often are you checking your blood sugar? Not at all    What concerns do you have today about your diabetes? None     Do you have any of these symptoms? (Select all that apply)  No numbness or tingling in feet.  No redness, sores or blisters on feet.  No complaints of excessive thirst.  No reports of blurry vision.  No significant changes to weight.    Have you had a diabetic eye exam in the last 12 months? No                Hyperlipidemia Follow-Up      Are you regularly taking any medication or supplement to lower your cholesterol?   Yes- pravastatin    Are you having muscle aches or other side effects that you think could be caused by your cholesterol lowering " medication?  No    Hypertension Follow-up      Do you check your blood pressure regularly outside of the clinic? Yes     Are you following a low salt diet? Yes    Are your blood pressures ever more than 140 on the top number (systolic) OR more   than 90 on the bottom number (diastolic), for example 140/90? No    BP Readings from Last 2 Encounters:   04/16/20 136/84   10/30/19 134/82     Hemoglobin A1C (%)   Date Value   10/30/2019 6.3 (H)   03/14/2019 5.7 (H)     LDL Cholesterol Calculated (mg/dL)   Date Value   03/14/2019 99   03/07/2018 92         How many servings of fruits and vegetables do you eat daily?  0-1    On average, how many sweetened beverages do you drink each day (Examples: soda, juice, sweet tea, etc.  Do NOT count diet or artificially sweetened beverages)?   1    How many days per week do you exercise enough to make your heart beat faster? 3 or less    How many minutes a day do you exercise enough to make your heart beat faster? 9 or less    How many days per week do you miss taking your medication? 0    Acute Illness   Acute illness concerns: sore throat  Onset: 3 days    Fever: no    Chills/Sweats: no     Headache (location?): no     Sinus Pressure:no    Conjunctivitis:  no    Ear Pain: no    Rhinorrhea: YES    Congestion: YES    Sore Throat: YES     Cough: no    Wheeze: no    Decreased Appetite: no    Nausea: no    Vomiting: no    Diarrhea:  no    Dysuria/Freq.: no    Fatigue/Achiness: no     Sick/Strep Exposure: no      Therapies Tried and outcome: Alkiselzer, dayquil (3 weeks) and Allergy medication with sudafed. Flonase in AM.             Reviewed and updated as needed this visit by Provider  Tobacco  Allergies  Meds  Problems  Med Hx  Surg Hx  Fam Hx         Review of Systems   ROS COMP: Constitutional, HEENT, cardiovascular, pulmonary, gi and gu systems are negative, except as otherwise noted.       Objective   Reported vitals:  /84    healthy, alert and no distress  PSYCH:  Alert and oriented times 3; coherent speech, normal   rate and volume, able to articulate logical thoughts, able   to abstract reason, no tangential thoughts, no hallucinations   or delusions  His affect is normal and pleasant  RESP: No cough, no audible wheezing, able to talk in full sentences  Remainder of exam unable to be completed due to telephone visits    Diagnostic Test Results:  Labs reviewed in Epic        Assessment/Plan:  1. Type 2 diabetes, HbA1c goal < 7% (H)  Previously diet controlled but some weight gain per patient - labs now.  - Hemoglobin A1c; Future    2. Morbid obesity (H)  Low carb diet  - Hemoglobin A1c; Future    3. Hyperlipidemia LDL goal <100  Stable - refill and check labs.  - pravastatin (PRAVACHOL) 80 MG tablet; Take 1 tablet (80 mg) by mouth every evening  Dispense: 90 tablet; Refill: 3  - Lipid panel reflex to direct LDL Fasting; Future    4. Hypertension goal BP (blood pressure) < 140/90  Controlled - refill and check labs.  - amLODIPine (NORVASC) 10 MG tablet; Take 1 tablet (10 mg) by mouth daily  Dispense: 90 tablet; Refill: 1  - Basic metabolic panel; Future    5. Sore throat  Suspect related to poorly controlled allergies - increase flonase and add non sudafed allergy medication  - CBC with platelets; Future    6. Seasonal allergic rhinitis due to pollen  Check lab  - CBC with platelets; Future    The uses and side effects, including black box warnings as appropriate, were discussed in detail.  All patient questions were answered.  The patient was instructed to call immediately if any side effects developed.     Return in about 6 months (around 10/16/2020).      Phone call duration:  12 minutes    Mona Scales MD

## 2020-04-17 NOTE — RESULT ENCOUNTER NOTE
Mr. Marte,    Your labs had some minor abnormalities but nothing that needs additional testing or treatment.  Continue your current medications.  Your diabetes looks great.  Follow up in 6 months.    Please contact the clinic if you have additional questions.  Thank you.    Sincerely,    Mona Scales MD

## 2020-07-03 DIAGNOSIS — I10 HYPERTENSION GOAL BP (BLOOD PRESSURE) < 140/90: ICD-10-CM

## 2020-07-06 RX ORDER — CARVEDILOL 25 MG/1
TABLET ORAL
Qty: 180 TABLET | Refills: 2 | Status: SHIPPED | OUTPATIENT
Start: 2020-07-06 | End: 2021-03-25

## 2020-07-06 NOTE — TELEPHONE ENCOUNTER
"  Requested Prescriptions   Pending Prescriptions Disp Refills     carvedilol (COREG) 25 MG tablet [Pharmacy Med Name: CARVEDILOL 25 MG TABLET] 180 tablet 0     Sig: TAKE 1 TABLET BY MOUTH TWICE A DAY WITH MEALS       Beta-Blockers Protocol Passed - 7/6/2020  9:50 AM        Passed - Blood pressure under 140/90 in past 12 months     BP Readings from Last 3 Encounters:   04/16/20 136/84   10/30/19 134/82   04/23/19 137/81                 Passed - Patient is age 6 or older        Passed - Recent (12 mo) or future (30 days) visit within the authorizing provider's specialty     Patient has had an office visit with the authorizing provider or a provider within the authorizing providers department within the previous 12 mos or has a future within next 30 days. See \"Patient Info\" tab in inbasket, or \"Choose Columns\" in Meds & Orders section of the refill encounter.              Passed - Medication is active on med list           Prescription approved per [facility] Refill Protocol.      Ruby Locke RN, BSN, PHN    "

## 2020-07-20 ENCOUNTER — OFFICE VISIT (OUTPATIENT)
Dept: URGENT CARE | Facility: URGENT CARE | Age: 55
End: 2020-07-20
Payer: COMMERCIAL

## 2020-07-20 VITALS
DIASTOLIC BLOOD PRESSURE: 97 MMHG | BODY MASS INDEX: 44.97 KG/M2 | HEART RATE: 76 BPM | TEMPERATURE: 98.1 F | OXYGEN SATURATION: 99 % | WEIGHT: 315 LBS | SYSTOLIC BLOOD PRESSURE: 164 MMHG | RESPIRATION RATE: 16 BRPM

## 2020-07-20 DIAGNOSIS — E11.9 TYPE 2 DIABETES, HBA1C GOAL < 7% (H): ICD-10-CM

## 2020-07-20 DIAGNOSIS — I10 HYPERTENSION GOAL BP (BLOOD PRESSURE) < 140/90: ICD-10-CM

## 2020-07-20 DIAGNOSIS — J01.00 ACUTE MAXILLARY SINUSITIS, RECURRENCE NOT SPECIFIED: Primary | ICD-10-CM

## 2020-07-20 PROCEDURE — 99214 OFFICE O/P EST MOD 30 MIN: CPT | Performed by: PHYSICIAN ASSISTANT

## 2020-07-20 RX ORDER — AMOXICILLIN 875 MG
875 TABLET ORAL 2 TIMES DAILY
Qty: 20 TABLET | Refills: 0 | Status: SHIPPED | OUTPATIENT
Start: 2020-07-20 | End: 2020-07-30

## 2020-07-20 ASSESSMENT — ENCOUNTER SYMPTOMS
ABDOMINAL PAIN: 0
SORE THROAT: 0
SINUS PRESSURE: 1
EYE DISCHARGE: 0
DIZZINESS: 0
LIGHT-HEADEDNESS: 0
DIARRHEA: 0
WEAKNESS: 0
ENDOCRINE NEGATIVE: 1
SHORTNESS OF BREATH: 0
COUGH: 0
WHEEZING: 0
FREQUENCY: 0
FEVER: 0
CHILLS: 0
GASTROINTESTINAL NEGATIVE: 1
CHEST TIGHTNESS: 0
MYALGIAS: 0
POLYDIPSIA: 0
RHINORRHEA: 0
MUSCULOSKELETAL NEGATIVE: 1
ADENOPATHY: 0
RESPIRATORY NEGATIVE: 1
DIAPHORESIS: 0
EYE REDNESS: 0
DYSURIA: 0
NAUSEA: 0
HEMATURIA: 0
CONSTITUTIONAL NEGATIVE: 1
VOMITING: 0
EYES NEGATIVE: 1
NEUROLOGICAL NEGATIVE: 1
PALPITATIONS: 0
HEADACHES: 0
CARDIOVASCULAR NEGATIVE: 1
EYE ITCHING: 0

## 2020-07-20 NOTE — PROGRESS NOTES
Chief Complaint:     Chief Complaint   Patient presents with     Sinus Problem     sinus being active up lately when lay down and stuffy nose        HPI: Geovany Marte is an 54 year old male who presents with facial pain/pressure, nasal congestion and post nasal drip. Symptoms began 2 weeks ago and has been worsening.  He has been using dayquil with no relief.  His symptoms are worse at night and he has been having a difficult time sleeping.  There is no shortness of breath, wheezing and chest pain. He denies any fever, headache, nausea or vomiting.     Recent travel?  no.      ROS:     Review of Systems   Constitutional: Negative.  Negative for chills, diaphoresis and fever.   HENT: Positive for sinus pressure. Negative for congestion, ear pain, rhinorrhea and sore throat.    Eyes: Negative.  Negative for discharge, redness and itching.   Respiratory: Negative.  Negative for cough, chest tightness, shortness of breath and wheezing.    Cardiovascular: Negative.  Negative for chest pain and palpitations.   Gastrointestinal: Negative.  Negative for abdominal pain, diarrhea, nausea and vomiting.   Endocrine: Negative.  Negative for polydipsia and polyuria.   Genitourinary: Negative for dysuria, frequency, hematuria and urgency.   Musculoskeletal: Negative.  Negative for myalgias.   Skin: Negative for rash.   Allergic/Immunologic: Negative for immunocompromised state.   Neurological: Negative.  Negative for dizziness, weakness, light-headedness and headaches.   Hematological: Negative for adenopathy.        Respiratory History  occasional episodes of bronchitis       Family History   Family History   Problem Relation Age of Onset     Cerebrovascular Disease Mother      Hypertension Mother      Diabetes Father      Hypertension Father      Heart Disease Brother      Breast Cancer Sister      Cancer Sister      Cerebrovascular Disease Brother      Breast Cancer Sister      Asthma Son      Thyroid Disease No family hx of       Glaucoma No family hx of      Macular Degeneration No family hx of         Problem history  Patient Active Problem List   Diagnosis     Hyperlipidemia LDL goal <100     Type 2 diabetes, HbA1c goal < 7% (H)     Disturbance of skin sensation     Intermittent asthma     Hypertension goal BP (blood pressure) < 140/90     Dry eye syndrome     Nuclear sclerosis     Erectile dysfunction     Glaucoma suspect     Gout     Seasonal allergic rhinitis     Morbid obesity (H)     Retrograde ejaculation     Bariatric surgery status     Plantar warts     Allergic rhinitis due to animals     Allergic rhinitis due to mold        Allergies  Allergies   Allergen Reactions     Lisinopril Anaphylaxis     Shellfish Allergy Difficulty breathing     Throat closing up     Shellfish-Derived Products      Throat swells up, hard time breathing        Social History  Social History     Socioeconomic History     Marital status:      Spouse name: Not on file     Number of children: 7     Years of education: 14     Highest education level: Not on file   Occupational History     Occupation:      Employer: [employer]   Social Needs     Financial resource strain: Not on file     Food insecurity     Worry: Not on file     Inability: Not on file     Transportation needs     Medical: Not on file     Non-medical: Not on file   Tobacco Use     Smoking status: Never Smoker     Smokeless tobacco: Never Used     Tobacco comment: 1994   Substance and Sexual Activity     Alcohol use: Yes     Alcohol/week: 0.0 standard drinks     Comment: Once a week.     Drug use: No     Sexual activity: Yes     Partners: Female   Lifestyle     Physical activity     Days per week: Not on file     Minutes per session: Not on file     Stress: Not on file   Relationships     Social connections     Talks on phone: Not on file     Gets together: Not on file     Attends Cheondoism service: Not on file     Active member of club or organization: Not on file     Attends  meetings of clubs or organizations: Not on file     Relationship status: Not on file     Intimate partner violence     Fear of current or ex partner: Not on file     Emotionally abused: Not on file     Physically abused: Not on file     Forced sexual activity: Not on file   Other Topics Concern     Parent/sibling w/ CABG, MI or angioplasty before 65F 55M? No      Service No     Blood Transfusions No     Caffeine Concern No     Occupational Exposure No     Hobby Hazards No     Sleep Concern No     Stress Concern No     Weight Concern No     Special Diet No     Back Care No     Exercise Yes     Bike Helmet No     Seat Belt Yes     Self-Exams No   Social History Narrative     Not on file        Current Meds    Current Outpatient Medications:      albuterol (PROAIR HFA) 108 (90 Base) MCG/ACT inhaler, Inhale 2 puffs into the lungs every 4 hours as needed (for asthma symptoms), Disp: 8.5 g, Rfl: 1     amLODIPine (NORVASC) 10 MG tablet, Take 1 tablet (10 mg) by mouth daily, Disp: 90 tablet, Rfl: 1     amoxicillin (AMOXIL) 875 MG tablet, Take 1 tablet (875 mg) by mouth 2 times daily for 10 days, Disp: 20 tablet, Rfl: 0     ASPIRIN 81 MG PO TABS, 1 TABLET DAILY (*), Disp: , Rfl:      azelastine (OPTIVAR) 0.05 % ophthalmic solution, Apply 1 drop to eye 2 times daily, Disp: 1 Bottle, Rfl: 11     Calcium-Magnesium-Vitamin D (CITRACAL CALCIUM+D PO), 3 times daily. Take one tablet twice daily., Disp: , Rfl:      carvedilol (COREG) 25 MG tablet, TAKE 1 TABLET BY MOUTH TWICE A DAY WITH MEALS, Disp: 180 tablet, Rfl: 2     EPINEPHrine (AUVI-Q) 0.3 MG/0.3ML injection 2-pack, Inject 0.3 mLs (0.3 mg) into the muscle as needed for anaphylaxis, Disp: 4 mL, Rfl: 1     fluticasone (FLONASE) 50 MCG/ACT nasal spray, Spray 1-2 sprays into both nostrils daily, Disp: 16 g, Rfl: 11     glucose blood VI test strips (ONE TOUCH ULTRA TEST) strip, by In Vitro route 2 times daily., Disp: 1 Box, Rfl: 12     omeprazole (PRILOSEC) 20 MG   capsule, TAKE 1 CAPSULE BY MOUTH EVERY DAY, Disp: 90 capsule, Rfl: 1     pravastatin (PRAVACHOL) 80 MG tablet, Take 1 tablet (80 mg) by mouth every evening, Disp: 90 tablet, Rfl: 3     ACE/ARB NOT PRESCRIBED, INTENTIONAL,, continuous prn for other Reported on 4/3/2017, Disp: , Rfl:      blood glucose (NO BRAND SPECIFIED) lancets standard, Use to test blood sugar 1 times daily or as directed., Disp: 100 each, Rfl: 3     blood glucose (NO BRAND SPECIFIED) test strip, Use to test blood sugar 1 times daily or as directed., Disp: 100 each, Rfl: 3     Blood Glucose Monitoring Suppl (ACCU-CHEK GUIDE ME) w/Device KIT, USE TO TEST BLOOD SUGAR 1 TIMES DAILY OR AS DIRECTED, Disp: 1 kit, Rfl: 0     ORDER FOR DME, Injection Supplies for Vitamin B12: 3cc syringes w/ 27 gauge needles, 1/2 inch length, Disp: 12 each, Rfl: 0     ORDER FOR DME, One touch glucometer with supplies to replace old meter for testing twice daily, Disp: 1 kit, Rfl: prn        OBJECTIVE     Vital signs reviewed by Ashu Nugent PA-C  BP (!) 164/97 (BP Location: Left arm, Patient Position: Sitting, Cuff Size: Adult Large)   Pulse 76   Temp 98.1  F (36.7  C) (Tympanic)   Resp 16   Wt (!) 155.6 kg (343 lb)   SpO2 99%   BMI 44.97 kg/m       Physical Exam  Vitals signs reviewed.   Constitutional:       General: He is not in acute distress.     Appearance: He is well-developed. He is not ill-appearing, toxic-appearing or diaphoretic.   HENT:      Head: Normocephalic and atraumatic.      Right Ear: Hearing, tympanic membrane, ear canal and external ear normal. No drainage, swelling or tenderness. Tympanic membrane is not perforated, erythematous, retracted or bulging.      Left Ear: Hearing, tympanic membrane, ear canal and external ear normal. No drainage, swelling or tenderness. Tympanic membrane is not perforated, erythematous, retracted or bulging.      Nose: Mucosal edema and congestion present. No nasal tenderness or rhinorrhea.      Right  Turbinates: Not enlarged or swollen.      Left Turbinates: Not enlarged or swollen.      Right Sinus: Maxillary sinus tenderness and frontal sinus tenderness present.      Left Sinus: Maxillary sinus tenderness and frontal sinus tenderness present.      Mouth/Throat:      Pharynx: No pharyngeal swelling, oropharyngeal exudate, posterior oropharyngeal erythema or uvula swelling.      Tonsils: No tonsillar exudate. 0 on the right. 0 on the left.   Eyes:      General: Lids are normal.         Right eye: No discharge.         Left eye: No discharge.      Conjunctiva/sclera: Conjunctivae normal.      Right eye: Right conjunctiva is not injected. No exudate.     Left eye: Left conjunctiva is not injected. No exudate.     Pupils: Pupils are equal, round, and reactive to light.   Neck:      Musculoskeletal: Normal range of motion and neck supple.   Cardiovascular:      Rate and Rhythm: Normal rate and regular rhythm.      Heart sounds: Normal heart sounds. No murmur. No friction rub. No gallop.    Pulmonary:      Effort: Pulmonary effort is normal. No accessory muscle usage, respiratory distress or retractions.      Breath sounds: Normal breath sounds and air entry. No stridor, decreased air movement or transmitted upper airway sounds. No decreased breath sounds, wheezing, rhonchi or rales.   Chest:      Chest wall: No tenderness.   Abdominal:      General: Bowel sounds are normal. There is no distension.      Palpations: Abdomen is soft. Abdomen is not rigid. There is no mass.      Tenderness: There is no abdominal tenderness. There is no guarding or rebound.   Musculoskeletal: Normal range of motion.   Lymphadenopathy:      Head:      Right side of head: No submental, submandibular, tonsillar, preauricular or posterior auricular adenopathy.      Left side of head: No submental, submandibular, tonsillar, preauricular or posterior auricular adenopathy.      Cervical:      Right cervical: No superficial or posterior cervical  adenopathy.     Left cervical: No superficial or posterior cervical adenopathy.   Skin:     General: Skin is warm.      Capillary Refill: Capillary refill takes less than 2 seconds.   Neurological:      Mental Status: He is alert and oriented to person, place, and time.      Cranial Nerves: No cranial nerve deficit.      Sensory: No sensory deficit.      Motor: No abnormal muscle tone.      Coordination: Coordination normal.      Deep Tendon Reflexes: Reflexes normal.   Psychiatric:         Behavior: Behavior normal. Behavior is cooperative.         Thought Content: Thought content normal.         Judgment: Judgment normal.           Labs:     No results found for any visits on 07/20/20.    Medical Decision Making:    Differential Diagnosis:  URI Adult/Peds:  Allergic rhinitis, Sinusitis and Viral upper respiratory illness        ASSESSMENT    1. Acute maxillary sinusitis, recurrence not specified    2. Hypertension goal BP (blood pressure) < 140/90    3. Type 2 diabetes, HbA1c goal < 7% (H)        PLAN    Patient presents with 2 day(s) facial pain/pressure, nasal congestion and post nasal drip.    Patient is in no acute distress.    Temp is 98.1 in clinic today, lung sounds were clear, and O2 sats at 99% on RA.  Imaging to rule out pneumonia is not indicated at this time.  Rx for Amoxicillin today.  Order placed for COVID testing per patient request.  Rest, Push fluids, vaporizer, elevation of head of bed.  Ibuprofen and or Tylenol for any fever or body aches.  Patient encouraged to continue to monitor blood sugars closely with illness.  Patient is hypertensive in clinic today.  Second BP reading was also above goal.  Patient instructed to follow up with PCP in the next week for BP recheck.  Sooner if symptoms worsen.  Worrisome symptoms discussed with instructions to go to the ED.  Patient verbalized understanding and agreed with this plan.         Ashu Nugent PA-C  7/20/2020, 11:41 AM

## 2020-07-20 NOTE — PATIENT INSTRUCTIONS
Patient Education     Self-Care for Sinusitis     Drinking plenty of water can help sinuses drain.   Sinusitis can often be managed with self-care. Self-care can keep sinuses moist and make you feel more comfortable. Remember to follow your doctor's instructions closely. This can make a big difference in getting your sinus problem under control.  Drink fluids  Drinking extra fluids helps thin your mucus. This lets it drain from your sinuses more easily. Have a glass of water every hour or two. A humidifier helps in much the same way. Fluids can also offset the drying effects of certain medicines. If you use a humidifier, follow the product maker's instructions on how to use it. Clean it on a regular schedule.  Use saltwater rinses  Rinses help keep your sinuses and nose moist. Mix a teaspoon of salt in 8 ounces of fresh, warm water. Use a bulb syringe to gently squirt the water into your nose a few times a day. You can also buy ready-made saline nasal sprays.  Apply hot or cold packs  Applying heat to the area surrounding your sinuses may make you feel more comfortable. Use a hot water bottle or a hand towel dipped in hot water. Some people also find ice packs effective for relieving pain.  Medicines  Your doctor may prescribe medications to help treat your sinusitis. If you have an infection, antibiotics can help clear it up. If you are prescribed antibiotics, take all pills on schedule until they are gone, even if you feel better. Decongestants help relieve swelling. Use decongestant sprays for short periods only under the direction of your doctor. If you have allergies, your doctor may prescribe medications to help relieve them.   Date Last Reviewed: 10/1/2016    6514-0261 [employer]. [address] [place] [id number]. All rights reserved. This information is not intended as a substitute for professional medical care. Always follow your healthcare professional's instructions.

## 2020-07-22 ENCOUNTER — OFFICE VISIT (OUTPATIENT)
Dept: OPTOMETRY | Facility: CLINIC | Age: 55
End: 2020-07-22
Payer: COMMERCIAL

## 2020-07-22 DIAGNOSIS — H40.003 GLAUCOMA SUSPECT OF BOTH EYES: ICD-10-CM

## 2020-07-22 DIAGNOSIS — H52.4 PRESBYOPIA: ICD-10-CM

## 2020-07-22 DIAGNOSIS — E11.9 TYPE 2 DIABETES MELLITUS WITHOUT COMPLICATION, WITHOUT LONG-TERM CURRENT USE OF INSULIN (H): Primary | ICD-10-CM

## 2020-07-22 DIAGNOSIS — H10.13 ALLERGIC CONJUNCTIVITIS OF BOTH EYES: ICD-10-CM

## 2020-07-22 DIAGNOSIS — H25.13 NUCLEAR SCLEROSIS OF BOTH EYES: ICD-10-CM

## 2020-07-22 DIAGNOSIS — H52.03 HYPERMETROPIA OF BOTH EYES: ICD-10-CM

## 2020-07-22 PROCEDURE — 92015 DETERMINE REFRACTIVE STATE: CPT | Performed by: OPTOMETRIST

## 2020-07-22 PROCEDURE — 92014 COMPRE OPH EXAM EST PT 1/>: CPT | Performed by: OPTOMETRIST

## 2020-07-22 RX ORDER — AZELASTINE HYDROCHLORIDE 0.5 MG/ML
1 SOLUTION/ DROPS OPHTHALMIC 2 TIMES DAILY
Qty: 1 BOTTLE | Refills: 11 | Status: SHIPPED | OUTPATIENT
Start: 2020-07-22 | End: 2021-09-13

## 2020-07-22 ASSESSMENT — SLIT LAMP EXAM - LIDS
COMMENTS: NORMAL
COMMENTS: NORMAL

## 2020-07-22 ASSESSMENT — EXTERNAL EXAM - RIGHT EYE: OD_EXAM: NORMAL

## 2020-07-22 ASSESSMENT — REFRACTION_WEARINGRX
OD_CYLINDER: SPHERE
OS_SPHERE: +2.50
SPECS_TYPE: OTC READERS
OS_SPHERE: +0.75
OD_SPHERE: +1.50
OD_CYLINDER: SPHERE
OS_AXIS: 045
OD_ADD: +2.25
OD_SPHERE: +2.50
OS_CYLINDER: +0.25
OS_CYLINDER: SPHERE
OS_ADD: +2.25

## 2020-07-22 ASSESSMENT — VISUAL ACUITY
OD_SC+: -1
OS_PH_SC: 20/20
OS_SC: 20/30
OD_SC: 20/30-1
OS_SC: 20/30
OD_SC: 20/50
OD_PH_SC: 20/20
OS_SC+: -1
OS_PH_SC+: -2
METHOD: SNELLEN - LINEAR

## 2020-07-22 ASSESSMENT — REFRACTION_MANIFEST
OD_SPHERE: +1.25
OS_ADD: +2.25
OS_CYLINDER: +0.25
OD_ADD: +2.25
OS_AXIS: 045
OS_SPHERE: +0.75

## 2020-07-22 ASSESSMENT — CONF VISUAL FIELD
OD_NORMAL: 1
OS_NORMAL: 1

## 2020-07-22 ASSESSMENT — TONOMETRY
OS_IOP_MMHG: 20
OD_IOP_MMHG: 18
IOP_METHOD: TONOPEN

## 2020-07-22 ASSESSMENT — EXTERNAL EXAM - LEFT EYE: OS_EXAM: NORMAL

## 2020-07-22 NOTE — Clinical Note
Jerry Lugo- could you please call Geovany to set up an appt with Dr. Louie for OCT/VF glaucoma follow up- he was last seen there 3 years ago.  I am also sending his wife's chart for same thing as they prob want to schedule at the same time.  Thanks!

## 2020-07-22 NOTE — PROGRESS NOTES
Chief Complaint   Patient presents with     Diabetic Eye Exam     Accompanied by self  Hemoglobin A1C   Date Value Ref Range Status   04/16/2020 6.2 (H) 0 - 5.6 % Final     Comment:     Normal <5.7% Prediabetes 5.7-6.4%  Diabetes 6.5% or higher - adopted from ADA   consensus guidelines.     10/30/2019 6.3 (H) 0 - 5.6 % Final     Comment:     Normal <5.7% Prediabetes 5.7-6.4%  Diabetes 6.5% or higher - adopted from ADA   consensus guidelines.     03/14/2019 5.7 (H) 0 - 5.6 % Final     Comment:     Normal <5.7% Prediabetes 5.7-6.4%  Diabetes 6.5% or higher - adopted from ADA   consensus guidelines.           Last Eye Exam: 10-  Dilated Previously: Yes    What are you currently using to see?  otc readers    Distance Vision Acuity: Noticed gradual change in both eyes    Near Vision Acuity: Satisfied with vision while reading  with otc readers    Eye Comfort: dry with allergies   Do you use eye drops? : Yes: systane  Occupation or Hobbies:  [employer]-  Position is gone due to covid- may retire-      Glaucoma suspect- Last OCT/VF at [place] was 2017.  Dr. Louie recommended 2 year follow up.    Birgit Vargas Optometric Assistant, A.B.O.C.     Medical, surgical and family histories reviewed and updated 7/22/2020.       OBJECTIVE: See Ophthalmology exam    ASSESSMENT:    ICD-10-CM    1. Type 2 diabetes mellitus without complication, without long-term current use of insulin (H)  E11.9 EYE EXAM (SIMPLE-NONBILLABLE)   2. Allergic conjunctivitis of both eyes  H10.13 EYE EXAM (SIMPLE-NONBILLABLE)     azelastine (OPTIVAR) 0.05 % ophthalmic solution   3. Nuclear sclerosis of both eyes  H25.13 EYE EXAM (SIMPLE-NONBILLABLE)   4. Glaucoma suspect of both eyes  H40.003 EYE EXAM (SIMPLE-NONBILLABLE)     OPHTHALMOLOGY ADULT REFERRAL   5. Hypermetropia of both eyes  H52.03 REFRACTION   6. Presbyopia  H52.4 REFRACTION      PLAN:    Geovany Marte aware  eye exam results will be sent to [facility].  Patient  Instructions   There are not any signs of the diabetes affecting the eyes today.  It is important that you get your eyes dilated once yearly and keep good control of your diabetes.    Optivar- 1 drop both eyes 2 x day as needed for itchy eyes.    You have the start of mild cataracts.  You may notice some blurred vision or glare with night driving.  It is important that you wear good sunglasses to protect your eyes from the ultraviolet light from the sun.  I recommend that you return in 1 year for an eye exam unless there are any sudden changes in your vision.       Referral to [facility] ophthalmology with Dr. Louie for OCT/Visual Field.    Eyeglass prescription given.    Return in 1 year for a complete eye exam or sooner if needed.    Yoseph Ortiz, OD

## 2020-07-22 NOTE — PATIENT INSTRUCTIONS
There are not any signs of the diabetes affecting the eyes today.  It is important that you get your eyes dilated once yearly and keep good control of your diabetes.    Optivar- 1 drop both eyes 2 x day as needed for itchy eyes.    You have the start of mild cataracts.  You may notice some blurred vision or glare with night driving.  It is important that you wear good sunglasses to protect your eyes from the ultraviolet light from the sun.  I recommend that you return in 1 year for an eye exam unless there are any sudden changes in your vision.       Referral to [facility] ophthalmology with Dr. Louie for OCT/Visual Field.    Eyeglass prescription given.    Return in 1 year for a complete eye exam or sooner if needed.    Yoseph Ortiz, OD

## 2020-07-22 NOTE — LETTER
7/22/2020         RE: Geovany Marte  [address]  [place] [address]        Dear Colleague,    Thank you for referring your patient, Geovany Marte, to the [facility]. Please see a copy of my visit note below.    Chief Complaint   Patient presents with     Diabetic Eye Exam     Accompanied by self  Hemoglobin A1C   Date Value Ref Range Status   04/16/2020 6.2 (H) 0 - 5.6 % Final     Comment:     Normal <5.7% Prediabetes 5.7-6.4%  Diabetes 6.5% or higher - adopted from ADA   consensus guidelines.     10/30/2019 6.3 (H) 0 - 5.6 % Final     Comment:     Normal <5.7% Prediabetes 5.7-6.4%  Diabetes 6.5% or higher - adopted from ADA   consensus guidelines.     03/14/2019 5.7 (H) 0 - 5.6 % Final     Comment:     Normal <5.7% Prediabetes 5.7-6.4%  Diabetes 6.5% or higher - adopted from ADA   consensus guidelines.           Last Eye Exam: 10-  Dilated Previously: Yes    What are you currently using to see?  otc readers    Distance Vision Acuity: Noticed gradual change in both eyes    Near Vision Acuity: Satisfied with vision while reading  with otc readers    Eye Comfort: dry with allergies   Do you use eye drops? : Yes: systane  Occupation or Hobbies:  [employer]-  Position is gone due to covid- may retire-      Glaucoma suspect- Last OCT/VF at [facility] was 2017.  Dr. Louie recommended 2 year follow up.    Birgit Vargas Optometric Assistant, A.B.O.C.     Medical, surgical and family histories reviewed and updated 7/22/2020.       OBJECTIVE: See Ophthalmology exam    ASSESSMENT:    ICD-10-CM    1. Type 2 diabetes mellitus without complication, without long-term current use of insulin (H)  E11.9 EYE EXAM (SIMPLE-NONBILLABLE)   2. Allergic conjunctivitis of both eyes  H10.13 EYE EXAM (SIMPLE-NONBILLABLE)     azelastine (OPTIVAR) 0.05 % ophthalmic solution   3. Nuclear sclerosis of both eyes  H25.13 EYE EXAM (SIMPLE-NONBILLABLE)   4. Glaucoma suspect of both eyes  H40.003 EYE EXAM  (SIMPLE-NONBILLABLE)     OPHTHALMOLOGY ADULT REFERRAL   5. Hypermetropia of both eyes  H52.03 REFRACTION   6. Presbyopia  H52.4 REFRACTION      PLAN:    Geovany Marte aware  eye exam results will be sent to [facility].  Patient Instructions   There are not any signs of the diabetes affecting the eyes today.  It is important that you get your eyes dilated once yearly and keep good control of your diabetes.    Optivar- 1 drop both eyes 2 x day as needed for itchy eyes.    You have the start of mild cataracts.  You may notice some blurred vision or glare with night driving.  It is important that you wear good sunglasses to protect your eyes from the ultraviolet light from the sun.  I recommend that you return in 1 year for an eye exam unless there are any sudden changes in your vision.       Referral to [facility] ophthalmology with Dr. Louie for OCT/Visual Field.    Eyeglass prescription given.    Return in 1 year for a complete eye exam or sooner if needed.    Yoseph Ortiz, OD                 Again, thank you for allowing me to participate in the care of your patient.        Sincerely,        Yoseph Ortiz, OD

## 2020-07-24 DIAGNOSIS — J01.00 ACUTE MAXILLARY SINUSITIS, RECURRENCE NOT SPECIFIED: ICD-10-CM

## 2020-07-24 PROCEDURE — U0003 INFECTIOUS AGENT DETECTION BY NUCLEIC ACID (DNA OR RNA); SEVERE ACUTE RESPIRATORY SYNDROME CORONAVIRUS 2 (SARS-COV-2) (CORONAVIRUS DISEASE [COVID-19]), AMPLIFIED PROBE TECHNIQUE, MAKING USE OF HIGH THROUGHPUT TECHNOLOGIES AS DESCRIBED BY CMS-2020-01-R: HCPCS | Performed by: PHYSICIAN ASSISTANT

## 2020-07-25 LAB
SARS-COV-2 RNA SPEC QL NAA+PROBE: NOT DETECTED
SPECIMEN SOURCE: NORMAL

## 2020-07-30 ENCOUNTER — OFFICE VISIT (OUTPATIENT)
Dept: FAMILY MEDICINE | Facility: CLINIC | Age: 55
End: 2020-07-30
Payer: COMMERCIAL

## 2020-07-30 VITALS
OXYGEN SATURATION: 97 % | RESPIRATION RATE: 20 BRPM | WEIGHT: 315 LBS | HEART RATE: 83 BPM | TEMPERATURE: 97.9 F | DIASTOLIC BLOOD PRESSURE: 86 MMHG | BODY MASS INDEX: 45.09 KG/M2 | SYSTOLIC BLOOD PRESSURE: 138 MMHG

## 2020-07-30 DIAGNOSIS — Z12.11 SCREEN FOR COLON CANCER: ICD-10-CM

## 2020-07-30 DIAGNOSIS — I10 HYPERTENSION GOAL BP (BLOOD PRESSURE) < 140/90: ICD-10-CM

## 2020-07-30 DIAGNOSIS — F41.9 ANXIETY: Primary | ICD-10-CM

## 2020-07-30 PROCEDURE — 99214 OFFICE O/P EST MOD 30 MIN: CPT | Performed by: FAMILY MEDICINE

## 2020-07-30 RX ORDER — ADHESIVE BANDAGE 3/4"
BANDAGE TOPICAL
Qty: 1 EACH | Refills: 0 | Status: SHIPPED | OUTPATIENT
Start: 2020-07-30

## 2020-07-30 RX ORDER — HYDROXYZINE HYDROCHLORIDE 50 MG/1
50 TABLET, FILM COATED ORAL EVERY 8 HOURS PRN
Qty: 60 TABLET | Refills: 3 | Status: SHIPPED | OUTPATIENT
Start: 2020-07-30 | End: 2021-01-18

## 2020-07-30 ASSESSMENT — PAIN SCALES - GENERAL: PAINLEVEL: NO PAIN (0)

## 2020-07-30 NOTE — PROGRESS NOTES
Subjective     Geovany Marte is a 54 year old male who presents to clinic today for the following health issues:    HPI       Diabetes Follow-up      How often are you checking your blood sugar? Not at all    What concerns do you have today about your diabetes? None     Do you have any of these symptoms? (Select all that apply)  No numbness or tingling in feet.  No redness, sores or blisters on feet.  No complaints of excessive thirst.  No reports of blurry vision.  No significant changes to weight.      BP Readings from Last 2 Encounters:   07/30/20 138/86   07/20/20 (!) 164/97     Hemoglobin A1C (%)   Date Value   04/16/2020 6.2 (H)   10/30/2019 6.3 (H)     LDL Cholesterol Calculated (mg/dL)   Date Value   04/16/2020 119 (H)   03/14/2019 99         Hyperlipidemia Follow-Up      Are you regularly taking any medication or supplement to lower your cholesterol?   Yes- pravastatin     Are you having muscle aches or other side effects that you think could be caused by your cholesterol lowering medication?  No    Hypertension Follow-up      Do you check your blood pressure regularly outside of the clinic? No     Are you following a low salt diet? No    Are your blood pressures ever more than 140 on the top number (systolic) OR more   than 90 on the bottom number (diastolic), for example 140/90? Does not monitor BP outside of clinic visits. Would like a prescription for BP cuffs     Asthma Follow-Up    Was ACT completed today?    Yes    ACT Total Scores 7/30/2020   ACT TOTAL SCORE -   ASTHMA ER VISITS -   ASTHMA HOSPITALIZATIONS -   ACT TOTAL SCORE (Goal Greater than or Equal to 20) 16   In the past 12 months, how many times did you visit the emergency room for your asthma without being admitted to the hospital? 0   In the past 12 months, how many times were you hospitalized overnight because of your asthma? 0       How many days per week do you miss taking your asthma controller medication?  0    Please describe any recent  triggers for your asthma: None    Have you had any Emergency Room Visits, Urgent Care Visits, or Hospital Admissions since your last office visit?  No      How many servings of fruits and vegetables do you eat daily?  0-1    On average, how many sweetened beverages do you drink each day (Examples: soda, juice, sweet tea, etc.  Do NOT count diet or artificially sweetened beverages)?   0    How many days per week do you exercise enough to make your heart beat faster? 3 or less    How many minutes a day do you exercise enough to make your heart beat faster? 9 or less    How many days per week do you miss taking your medication? 0    Acute Illness   Acute illness concerns: sinus problems  Onset: 1-2 months    Fever: no     Chills/Sweats: no     Headache (location?): YES    Sinus Pressure:YES    Conjunctivitis:  no    Ear Pain: no    Rhinorrhea: no     Congestion: YES    Sore Throat: no      Cough: YES-non-productive    Wheeze: no     Decreased Appetite: no     Nausea: no     Vomiting: no     Diarrhea:  no     Dysuria/Freq.: no     Fatigue/Achiness: no     Sick/Strep Exposure: no      Therapies Tried and outcome: was seen in UC on 7/20/20 and was given amoxicillin. Pt states it has helped a little           Reviewed and updated as needed this visit by Provider  Tobacco  Allergies  Meds  Problems  Med Hx  Surg Hx  Fam Hx         Review of Systems   Constitutional, HEENT, cardiovascular, pulmonary, gi and gu systems are negative, except as otherwise noted.      Objective    /86 (BP Location: Left arm)   Pulse 83   Temp 97.9  F (36.6  C) (Oral)   Resp 20   Wt (!) 156 kg (343 lb 14.4 oz)   SpO2 97%   BMI 45.09 kg/m    Body mass index is 45.09 kg/m .  Physical Exam   GENERAL: healthy, alert, no distress and obese  EYES: Eyes grossly normal to inspection, PERRL and conjunctivae and sclerae normal  HENT: ear canals and TM's normal, nose and mouth without ulcers or lesions  NECK: no adenopathy, no asymmetry,  "masses, or scars and thyroid normal to palpation  RESP: lungs clear to auscultation - no rales, rhonchi or wheezes  CV: regular rate and rhythm, normal S1 S2, no S3 or S4, no murmur, click or rub, no peripheral edema and peripheral pulses strong  ABDOMEN: soft, nontender, no hepatosplenomegaly, no masses and bowel sounds normal  MS: no gross musculoskeletal defects noted, no edema  PSYCH: mentation appears normal and anxious    Diagnostic Test Results:  Labs reviewed in Epic        Assessment & Plan     1. Anxiety  Stress echo done a year ago and normal - trial of hydroxyzine and home bp cuff.  - Blood Pressure Monitoring (BLOOD PRESSURE CUFF) MISC; Use to check blood pressure 3 - 4 times per week.  Dispense: 1 each; Refill: 0  - hydrOXYzine (ATARAX) 50 MG tablet; Take 1 tablet (50 mg) by mouth every 8 hours as needed for anxiety  Dispense: 60 tablet; Refill: 3    2. Hypertension goal BP (blood pressure) < 140/90  As above  - Blood Pressure Monitoring (BLOOD PRESSURE CUFF) MISC; Use to check blood pressure 3 - 4 times per week.  Dispense: 1 each; Refill: 0    3. Screen for colon cancer  screening  - GASTROENTEROLOGY ADULT REF PROCEDURE ONLY; Future     BMI:   Estimated body mass index is 45.09 kg/m  as calculated from the following:    Height as of 10/30/19: 1.86 m (6' 1.23\").    Weight as of this encounter: 156 kg (343 lb 14.4 oz).   Weight management plan: Discussed healthy diet and exercise guidelines      The uses and side effects, including black box warnings as appropriate, were discussed in detail.  All patient questions were answered.  The patient was instructed to call immediately if any side effects developed.     Return in about 4 days (around 8/3/2020).    Mona Scales MD  [facility]  "

## 2020-07-30 NOTE — PATIENT INSTRUCTIONS
At [facility], we strive to deliver an exceptional experience to you, every time we see you. If you receive a survey, please complete it as we do value your feedback.  If you have MyChart, you can expect to receive results automatically within 24 hours of their completion.  Your provider will send a note interpreting your results as well.   If you do not have MyChart, you should receive your results in about a week by mail.    Your care team:                            Family Medicine Internal Medicine   MD Gurpreet Howard MD Shantel Branch-Fleming, MD Srinivasa Vaka, MD Katya Georgiev PA-C Megan Hill, APRN CNP    Lee Yin, MD Pediatrics   Dmitry Chavez, PARicardaC  Lani Portillo, CNP MD Mouna Turk APRN CNP   MD Marge Willson MD Deborah Mielke, MD Candelaria Denton, APRN CNP  Barbara Chand, PARicardaC  Dasha Page, CNP  MD Inocencia Montoya MD Angela Wermerskirchen, MD      Clinic hours: Monday - Thursday 7 am-7 pm; Fridays 7 am-5 pm.   Urgent care: Monday - Friday 11 am-9 pm; Saturday and Sunday 9 am-5 pm.    Clinic: [phone]       [facility]: Monday - Thursday 8 am - 7 pm; Friday 8 am - 6 pm  [facility]: [phone]     Use [email or web address] for 24/7 diagnosis and treatment of dozens of conditions.

## 2020-07-31 ENCOUNTER — TELEPHONE (OUTPATIENT)
Dept: FAMILY MEDICINE | Facility: CLINIC | Age: 55
End: 2020-07-31

## 2020-07-31 ASSESSMENT — ASTHMA QUESTIONNAIRES: ACT_TOTALSCORE: 16

## 2020-07-31 NOTE — TELEPHONE ENCOUNTER
Reason for Call:  prescription    Detailed comments: Patient states the Blood Pressure Monitoring (BLOOD PRESSURE CUFF)   [facility] went to  they stated they did not have and he would have to go to [facility] and they would transfer prescription over. Per pt [facility] now states they deleted the order for the bp cuff and now a new on will have to be issued and sent to [facility] please fax to @ [phone] and call pt with any questions     Phone Number Patient can be reached at: Cell number on file:    Telephone Information:   Mobile [phone]       Best Time: Any    Can we leave a detailed message on this number? YES    Call taken on 7/31/2020 at 12:18 PM by Alvarado Alanis

## 2020-07-31 NOTE — TELEPHONE ENCOUNTER
Delegating to team, please re-print out DME order for blood pressure cuff that was created by PCP yesterday. See current medication list.  Refax order to [facility] at number noted below.  Notify patient when complete.    Fani Garcia RN  [facility]

## 2020-08-11 DIAGNOSIS — J45.20 INTERMITTENT ASTHMA, UNCOMPLICATED: ICD-10-CM

## 2020-08-13 RX ORDER — ALBUTEROL SULFATE 90 UG/1
2 AEROSOL, METERED RESPIRATORY (INHALATION) EVERY 4 HOURS PRN
Qty: 8.5 G | Refills: 1 | Status: SHIPPED | OUTPATIENT
Start: 2020-08-13 | End: 2021-04-01

## 2020-08-13 NOTE — TELEPHONE ENCOUNTER
Routing refill request to provider for review/approval because:  Failed ACT    Beatriz Frias RN, [facility] Triage

## 2020-08-31 DIAGNOSIS — Z11.59 ENCOUNTER FOR SCREENING FOR OTHER VIRAL DISEASES: Primary | ICD-10-CM

## 2020-09-10 ENCOUNTER — OFFICE VISIT (OUTPATIENT)
Dept: OPHTHALMOLOGY | Facility: CLINIC | Age: 55
End: 2020-09-10
Payer: COMMERCIAL

## 2020-09-10 DIAGNOSIS — H40.003 GLAUCOMA SUSPECT OF BOTH EYES: Primary | ICD-10-CM

## 2020-09-10 PROCEDURE — 92133 CPTRZD OPH DX IMG PST SGM ON: CPT | Performed by: STUDENT IN AN ORGANIZED HEALTH CARE EDUCATION/TRAINING PROGRAM

## 2020-09-10 PROCEDURE — 92012 INTRM OPH EXAM EST PATIENT: CPT | Performed by: STUDENT IN AN ORGANIZED HEALTH CARE EDUCATION/TRAINING PROGRAM

## 2020-09-10 PROCEDURE — 92083 EXTENDED VISUAL FIELD XM: CPT | Performed by: STUDENT IN AN ORGANIZED HEALTH CARE EDUCATION/TRAINING PROGRAM

## 2020-09-10 ASSESSMENT — VISUAL ACUITY
OS_SC+: -1
OD_SC: 20/40
OS_SC: 20/30
METHOD: SNELLEN - LINEAR
OD_SC+: -1

## 2020-09-10 ASSESSMENT — TONOMETRY
IOP_METHOD: APPLANATION
OS_IOP_MMHG: 16
OD_IOP_MMHG: 18

## 2020-09-10 ASSESSMENT — SLIT LAMP EXAM - LIDS
COMMENTS: NORMAL
COMMENTS: NORMAL

## 2020-09-10 ASSESSMENT — EXTERNAL EXAM - RIGHT EYE: OD_EXAM: NORMAL

## 2020-09-10 ASSESSMENT — EXTERNAL EXAM - LEFT EYE: OS_EXAM: NORMAL

## 2020-09-10 NOTE — LETTER
9/10/2020         RE: Geovany Marte  [address]  [place] [address]        Dear Colleague,    Thank you for referring your patient, Geovany Marte, to the [facility]. Please see a copy of my visit note below.     Current Eye Medications:  Optivar- 1 drop both eyes 2 x day as needed     Subjective: referral from Dr. Ortiz for glaucoma eval, was last here in 2017. Wife also has appt today at 315. Hasn't noticed any vision changes, doing well.     Objective:  See Ophthalmology Exam.      Assessment:  Geovany Marte is a 54 year old male who presents with:     Glaucoma suspect of both eyes Last testing 10/2017. Intraocular pressure 18/16 today with thinner pachymetry (530/520).     OCT optic nerve: avg retinal nerve fiber layer 96/93. Mild thinning of the superior quadrant in the left eye - watch closely.     Logan visual field (HVF) 24-2: within normal limits right eye; ?start of superior nasal step left eye (doesn't correspond with OCT).    Monitor closely.        Plan:  Continue monitoring for glaucoma suspicion    Return in 1 year for repeat testing    Jason Louie MD  [phone]             Again, thank you for allowing me to participate in the care of your patient.        Sincerely,        Jason Louie MD

## 2020-09-10 NOTE — PROGRESS NOTES
Current Eye Medications:  Optivar- 1 drop both eyes 2 x day as needed     Subjective: referral from Dr. Ortiz for glaucoma eval, was last here in 2017. Wife also has appt today at 315. Hasn't noticed any vision changes, doing well.     Objective:  See Ophthalmology Exam.      Assessment:  Geovany Marte is a 54 year old male who presents with:     Glaucoma suspect of both eyes Last testing 10/2017. Intraocular pressure 18/16 today with thinner pachymetry (530/520).     OCT optic nerve: avg retinal nerve fiber layer 96/93. Mild thinning of the superior quadrant in the left eye - watch closely.     Logan visual field (HVF) 24-2: within normal limits right eye; ?start of superior nasal step left eye (doesn't correspond with OCT).    Monitor closely.        Plan:  Continue monitoring for glaucoma suspicion    Return in 1 year for repeat testing    Jason Louie MD  [phone]

## 2020-09-21 RX ORDER — BISACODYL 5 MG/1
15 TABLET, DELAYED RELEASE ORAL SEE ADMIN INSTRUCTIONS
Qty: 3 TABLET | Refills: 0 | Status: SHIPPED | OUTPATIENT
Start: 2020-09-21 | End: 2022-04-25

## 2020-09-21 RX ORDER — SODIUM, POTASSIUM,MAG SULFATES 17.5-3.13G
1 SOLUTION, RECONSTITUTED, ORAL ORAL SEE ADMIN INSTRUCTIONS
Qty: 2 BOTTLE | Refills: 0 | Status: SHIPPED | OUTPATIENT
Start: 2020-09-21 | End: 2021-01-11

## 2020-09-25 DIAGNOSIS — Z11.59 ENCOUNTER FOR SCREENING FOR OTHER VIRAL DISEASES: ICD-10-CM

## 2020-09-25 LAB
SARS-COV-2 RNA SPEC QL NAA+PROBE: NOT DETECTED
SPECIMEN SOURCE: NORMAL

## 2020-09-25 PROCEDURE — U0003 INFECTIOUS AGENT DETECTION BY NUCLEIC ACID (DNA OR RNA); SEVERE ACUTE RESPIRATORY SYNDROME CORONAVIRUS 2 (SARS-COV-2) (CORONAVIRUS DISEASE [COVID-19]), AMPLIFIED PROBE TECHNIQUE, MAKING USE OF HIGH THROUGHPUT TECHNOLOGIES AS DESCRIBED BY CMS-2020-01-R: HCPCS | Performed by: INTERNAL MEDICINE

## 2020-09-28 ENCOUNTER — VIRTUAL VISIT (OUTPATIENT)
Dept: FAMILY MEDICINE | Facility: CLINIC | Age: 55
End: 2020-09-28
Payer: COMMERCIAL

## 2020-09-28 ENCOUNTER — ANESTHESIA EVENT (OUTPATIENT)
Dept: SURGERY | Facility: AMBULATORY SURGERY CENTER | Age: 55
End: 2020-09-28

## 2020-09-28 DIAGNOSIS — Z01.818 PREOPERATIVE EXAMINATION: ICD-10-CM

## 2020-09-28 DIAGNOSIS — Z98.84 BARIATRIC SURGERY STATUS: ICD-10-CM

## 2020-09-28 DIAGNOSIS — E11.9 TYPE 2 DIABETES, HBA1C GOAL < 7% (H): ICD-10-CM

## 2020-09-28 DIAGNOSIS — I10 HYPERTENSION GOAL BP (BLOOD PRESSURE) < 140/90: ICD-10-CM

## 2020-09-28 DIAGNOSIS — J45.20 MILD INTERMITTENT ASTHMA WITHOUT COMPLICATION: ICD-10-CM

## 2020-09-28 DIAGNOSIS — E78.5 HYPERLIPIDEMIA LDL GOAL <100: ICD-10-CM

## 2020-09-28 DIAGNOSIS — Z12.11 SCREEN FOR COLON CANCER: ICD-10-CM

## 2020-09-28 DIAGNOSIS — Z01.818 PREOPERATIVE EXAMINATION: Primary | ICD-10-CM

## 2020-09-28 LAB
GLUCOSE SERPL-MCNC: 181 MG/DL (ref 70–99)
HBA1C MFR BLD: 7.1 % (ref 0–5.6)

## 2020-09-28 PROCEDURE — 83036 HEMOGLOBIN GLYCOSYLATED A1C: CPT | Performed by: FAMILY MEDICINE

## 2020-09-28 PROCEDURE — 82947 ASSAY GLUCOSE BLOOD QUANT: CPT | Performed by: FAMILY MEDICINE

## 2020-09-28 PROCEDURE — 99214 OFFICE O/P EST MOD 30 MIN: CPT | Mod: 95 | Performed by: FAMILY MEDICINE

## 2020-09-28 PROCEDURE — 36415 COLL VENOUS BLD VENIPUNCTURE: CPT | Performed by: FAMILY MEDICINE

## 2020-09-28 ASSESSMENT — ASTHMA QUESTIONNAIRES
QUESTION_2 LAST FOUR WEEKS HOW OFTEN HAVE YOU HAD SHORTNESS OF BREATH: NOT AT ALL
ACT_TOTALSCORE: 24
QUESTION_3 LAST FOUR WEEKS HOW OFTEN DID YOUR ASTHMA SYMPTOMS (WHEEZING, COUGHING, SHORTNESS OF BREATH, CHEST TIGHTNESS OR PAIN) WAKE YOU UP AT NIGHT OR EARLIER THAN USUAL IN THE MORNING: NOT AT ALL
QUESTION_5 LAST FOUR WEEKS HOW WOULD YOU RATE YOUR ASTHMA CONTROL: WELL CONTROLLED
QUESTION_4 LAST FOUR WEEKS HOW OFTEN HAVE YOU USED YOUR RESCUE INHALER OR NEBULIZER MEDICATION (SUCH AS ALBUTEROL): NOT AT ALL
QUESTION_1 LAST FOUR WEEKS HOW MUCH OF THE TIME DID YOUR ASTHMA KEEP YOU FROM GETTING AS MUCH DONE AT WORK, SCHOOL OR AT HOME: NONE OF THE TIME

## 2020-09-28 NOTE — PATIENT INSTRUCTIONS
"  Preparing for Your Surgery  Getting started  A surgery nurse will call you to review your health history and instructions. They will give you an arrival time based on your scheduled surgery time.  Please be ready to share the following:    Your doctor's clinic name and phone number    Your medical, surgical and anesthesia history    A list of allergies and sensitivities    A list of medicines, including herbal treatments and over-the-counter drugs    Whether the patient has a legal guardian (ask how to send us the papers in advance)  If your child is having surgery, please ask for a copy of Preparing for Your Child's Surgery.    Preparing for surgery    Within 30 days of surgery: Have an exam at your family clinic (primary care clinic), or go to a pre-operative clinic. This exam is called a \"History and Physical,\" or H&P.    At your H&P exam, talk to your care team about all medicines you take. If you need to stop any medicines before surgery, ask when to start taking them again.  ? We do this for your safety. Many medicines can make you bleed too much during surgery. Some change how well surgery (anesthesia) drugs work.    Call your insurance company to see what it will and won't pay for. Ask if they need to pre-approve the surgery. (If no insurance, call [phone].)    Call your surgeon's clinic if there's any change in your health. This includes signs of a cold or flu (sore throat, runny nose, cough, rash, fever). It also includes a scrape or scratch near the surgery site.    If you have questions on the day of surgery, call your surgery center.  Eating and drinking guidelines  For your safety: Unless your surgeon tells you otherwise, follow the guidelines below.    Eat and drink as usual until 8 hours before surgery. After that, no food or milk.    Drink clear liquids until 2 hours before surgery. These are liquids you can see through, like water, Gatorade and Propel Water. You may also have black coffee " and tea (no cream or milk).    Nothing by mouth within 2 hours of surgery. This includes gum, candy and breath mints.    Stop alcohol the midnight before surgery.    If your family clinic tells you to take medicine on the morning of surgery, it's okay to take it with a sip of water.  Preventing infection    Shower or bathe the night before and morning of your surgery. Follow the instructions your clinic gave you. (If no instructions, use regular soap.)    Don't shave or clip hair near your surgery site. This can lead to skin infection.    Don't smoke the morning of surgery. Smoking increases the risk of infection. You may chew nicotine gum up to 2 hours before surgery. A nicotine patch is okay.  ? Note: Some surgeries require you to completely quit smoking and nicotine. Check with your surgeon.    Your care team will make every effort to keep you safe from infection. We will:  ? Clean our hands often with soap and water (or an alcohol-based hand rub).  ? Clean the skin at your surgery site with a special soap that kills germs. We'll also remove hair from the site as needed.  ? Wear special hair covers, masks, gowns and gloves during surgery.  ? Give antibiotic medicine, if prescribed. Not all surgeries need antibiotics.  What to bring on the day of surgery    Photo ID and insurance card    Copy of your health care directive, if you have one    Glasses and hearing aides (bring cases)  ? You can't wear contacts during surgery    Inhaler and eye drops, if you use them (tell us about these when you arrive)    CPAP machine or breathing device, if you use them    A few personal items, if spending the night    If you have . . .  ? A pacemaker or ICD (cardiac defibrillator): Bring the ID card.  ? An implanted stimulator: Bring the remote control.  ? A legal guardian: Bring a copy of the certified (court-stamped) guardianship papers.  Please remove any jewelry, including body piercings. Leave jewelry and other valuables at  home.  If you're going home the day of surgery  Important: If you don't follow the rules below, we must cancel your surgery.     Arrange for someone to drive you home after surgery. You may not drive, take a taxi or take public transportation by yourself (unless you'll have local anesthesia only).    Arrange for a responsible adult to stay with you overnight. If you don't, we may keep you in the hospital overnight, and you may need to pay the costs yourself.  Questions?   If you have any questions for your care team, list them here: _________________________________________________________________________________________________________________________________________________________________________________________________________________________________________________________________________________________________________________________  For informational purposes only. Not to replace the advice of your health care provider. Copyright   9188-5567 [employer]. All rights reserved. Clinically reviewed by Rosalie Sanchez MD. SMARTworks 923335 - REV 07/19.    Before Your Procedure or Hospital Admission  Testing for COVID-19 (Coronavirus)  Thank you for choosing [facility] for your health care needs. This is a very challenging time for everyone. The World Health Organization and the [place] of [place] have declared the COVID-19 virus a pandemic.   Our goal is to keep you and our team here at [facility] safe and healthy. We've taken several steps to make this happen. For example:    We screen our staff, care teams and patients for COVID-19.    Everyone at [facility] must wear a mask and stay 6 feet apart.    We are limiting hospital and clinic visitors.  Before you come in  All patients must get tested for COVID-19. Your test needs to happen 1 to 4 days before you check in to the hospital or surgery site.  We'll call about a week in advance to set up a testing time. The call may come from  a phone number you don't know. Then, you'll need to travel to a testing clinic, where we'll take a swab of your nose or throat.   After the test, please stay at home and stay out of contact with other people. It will be harder for you to recover if you get COVID-19 before your treatment.   Please follow all current safety guidelines, including:    Limit trips outside your home.    Limit the number of people you see.    Always wear a mask outside your home.    Use social distancing. (Stay 6 feet away from others whenever you can.)    Wash your hands often.  If your test shows you have COVID-19  If your test is positive, we'll let you know. A positive test means that you have the virus.   We'll probably have to postpone your admission, surgery or procedure. Your doctor will discuss this with you. After that, we'll let you know what to do and when you can reschedule.   We may need to cancel your treatment on short notice for other reasons, too.  If your test shows you DON'T have COVID-19  Even if your test is negative, you may still get COVID-19. It's rare but, sometimes, the test result is wrong. You could also catch the virus after taking the test.   There's a very small chance that you could catch COVID-19 in the hospital or surgery center. [facility] has taken many steps to prevent this from happening.   Day of your surgery or procedure    Please come wearing a mask or something else that covers both your nose and mouth.    When you arrive, we'll ask you some questions to find out if you have any signs or symptoms of COVID-19.    Ask your care team if you can have visitors. All visitors must wear masks and will be screened for signs of COVID-19.  ? Even if no visitors are allowed, you can still have with you:    Your legal guardian or legal decision maker    A parent and one other visitor, if you are younger than 18 years old    A partner and a , if you are in labor  ? We might need to teach you about  taking care of yourself after surgery. If so, a visitor can come into the hospital to learn about it, too.  ? The rules for visitors change often, depending on how much the virus is spreading. To learn more, see Visiting a Loved One in the Hospital during the COVID-19 Outbreak.  Please call your care team, hospital or surgery center if you have any questions. We thank you for your understanding and for choosing [facility] for your care.   Questions and Answers  Does it matter where I get tested for COVID-19?  Yes. We urge you to get tested at one of our [facility] COVID-19 testing sites. We process these tests in our lab and can get the results quickly. Your [facility] care team needs to get your results before you check in.  What should I do if I can't get tested at [facility]?  You can get tested somewhere else, but you'll need to take these extra steps:  1. Contact your family doctor or clinic to arrange your test.  2. Take the test within 4 days of your surgery or procedure. We can't accept tests older than 4 days.  3. Make sure your doctor or clinic faxes your results to [facility] at [phone].  If we don't get your results in time, we may have to postpone or cancel your treatment.  For informational purposes only. Not to replace the advice of your health care provider. Copyright   2020 [facility]. All rights reserved. Clinically reviewed by Infection Prevention and the [facility] COVID-19 Clinical Team. AppMyDay 130449 - 07/20.    How to Take Your Medication Before Surgery  - STOP taking all vitamins and herbal supplements 14 days before surgery.    Preparing for Your Surgery  Getting started  A surgery nurse will call you to review your health history and instructions. They will give you an arrival time based on your scheduled surgery time.  Please be ready to share the following:    Your doctor's clinic name and phone number    Your medical,  "surgical and anesthesia history    A list of allergies and sensitivities    A list of medicines, including herbal treatments and over-the-counter drugs    Whether the patient has a legal guardian (ask how to send us the papers in advance)  If your child is having surgery, please ask for a copy of Preparing for Your Child's Surgery.    Preparing for surgery    Within 30 days of surgery: Have an exam at your family clinic (primary care clinic), or go to a pre-operative clinic. This exam is called a \"History and Physical,\" or H&P.    At your H&P exam, talk to your care team about all medicines you take. If you need to stop any medicines before surgery, ask when to start taking them again.  ? We do this for your safety. Many medicines can make you bleed too much during surgery. Some change how well surgery (anesthesia) drugs work.    Call your insurance company to see what it will and won't pay for. Ask if they need to pre-approve the surgery. (If no insurance, call [phone].)    Call your surgeon's clinic if there's any change in your health. This includes signs of a cold or flu (sore throat, runny nose, cough, rash, fever). It also includes a scrape or scratch near the surgery site.    If you have questions on the day of surgery, call your surgery center.  Eating and drinking guidelines  For your safety: Unless your surgeon tells you otherwise, follow the guidelines below.    Eat and drink as usual until 8 hours before surgery. After that, no food or milk.    Drink clear liquids until 2 hours before surgery. These are liquids you can see through, like water, Gatorade and Propel Water. You may also have black coffee and tea (no cream or milk).    Nothing by mouth within 2 hours of surgery. This includes gum, candy and breath mints.    Stop alcohol the midnight before surgery.    If your family clinic tells you to take medicine on the morning of surgery, it's okay to take it with a sip of water.  Preventing " infection    Shower or bathe the night before and morning of your surgery. Follow the instructions your clinic gave you. (If no instructions, use regular soap.)    Don't shave or clip hair near your surgery site. This can lead to skin infection.    Don't smoke the morning of surgery. Smoking increases the risk of infection. You may chew nicotine gum up to 2 hours before surgery. A nicotine patch is okay.  ? Note: Some surgeries require you to completely quit smoking and nicotine. Check with your surgeon.    Your care team will make every effort to keep you safe from infection. We will:  ? Clean our hands often with soap and water (or an alcohol-based hand rub).  ? Clean the skin at your surgery site with a special soap that kills germs. We'll also remove hair from the site as needed.  ? Wear special hair covers, masks, gowns and gloves during surgery.  ? Give antibiotic medicine, if prescribed. Not all surgeries need antibiotics.  What to bring on the day of surgery    Photo ID and insurance card    Copy of your health care directive, if you have one    Glasses and hearing aides (bring cases)  ? You can't wear contacts during surgery    Inhaler and eye drops, if you use them (tell us about these when you arrive)    CPAP machine or breathing device, if you use them    A few personal items, if spending the night    If you have . . .  ? A pacemaker or ICD (cardiac defibrillator): Bring the ID card.  ? An implanted stimulator: Bring the remote control.  ? A legal guardian: Bring a copy of the certified (court-stamped) guardianship papers.  Please remove any jewelry, including body piercings. Leave jewelry and other valuables at home.  If you're going home the day of surgery  Important: If you don't follow the rules below, we must cancel your surgery.     Arrange for someone to drive you home after surgery. You may not drive, take a taxi or take public transportation by yourself (unless you'll have local anesthesia  only).    Arrange for a responsible adult to stay with you overnight. If you don't, we may keep you in the hospital overnight, and you may need to pay the costs yourself.  Questions?   If you have any questions for your care team, list them here: _________________________________________________________________________________________________________________________________________________________________________________________________________________________________________________________________________________________________________________________  For informational purposes only. Not to replace the advice of your health care provider. Copyright   9308-8760 [facility]. All rights reserved. Clinically reviewed by Rosalie Sanchez MD. SMARTworks 006146 - REV 07/19.    Before Your Procedure or Hospital Admission  Testing for COVID-19 (Coronavirus)  Thank you for choosing [facility] for your health care needs. This is a very challenging time for everyone. The World Health Organization and the [place] of [place] have declared the COVID-19 virus a pandemic.   Our goal is to keep you and our team here at [facility] safe and healthy. We've taken several steps to make this happen. For example:    We screen our staff, care teams and patients for COVID-19.    Everyone at [facility] must wear a mask and stay 6 feet apart.    We are limiting hospital and clinic visitors.  Before you come in  All patients must get tested for COVID-19. Your test needs to happen 1 to 4 days before you check in to the hospital or surgery site.  We'll call about a week in advance to set up a testing time. The call may come from a phone number you don't know. Then, you'll need to travel to a testing clinic, where we'll take a swab of your nose or throat.   After the test, please stay at home and stay out of contact with other people. It will be harder for you to recover if you get COVID-19 before your treatment.    Please follow all current safety guidelines, including:    Limit trips outside your home.    Limit the number of people you see.    Always wear a mask outside your home.    Use social distancing. (Stay 6 feet away from others whenever you can.)    Wash your hands often.  If your test shows you have COVID-19  If your test is positive, we'll let you know. A positive test means that you have the virus.   We'll probably have to postpone your admission, surgery or procedure. Your doctor will discuss this with you. After that, we'll let you know what to do and when you can reschedule.   We may need to cancel your treatment on short notice for other reasons, too.  If your test shows you DON'T have COVID-19  Even if your test is negative, you may still get COVID-19. It's rare but, sometimes, the test result is wrong. You could also catch the virus after taking the test.   There's a very small chance that you could catch COVID-19 in the hospital or surgery center. [facility] has taken many steps to prevent this from happening.   Day of your surgery or procedure    Please come wearing a mask or something else that covers both your nose and mouth.    When you arrive, we'll ask you some questions to find out if you have any signs or symptoms of COVID-19.    Ask your care team if you can have visitors. All visitors must wear masks and will be screened for signs of COVID-19.  ? Even if no visitors are allowed, you can still have with you:    Your legal guardian or legal decision maker    A parent and one other visitor, if you are younger than 18 years old    A partner and a , if you are in labor  ? We might need to teach you about taking care of yourself after surgery. If so, a visitor can come into the hospital to learn about it, too.  ? The rules for visitors change often, depending on how much the virus is spreading. To learn more, see Visiting a Loved One in the Hospital during the COVID-19 Outbreak.  Please  call your care team, hospital or surgery center if you have any questions. We thank you for your understanding and for choosing [facility] for your care.   Questions and Answers  Does it matter where I get tested for COVID-19?  Yes. We urge you to get tested at one of our [facility] COVID-19 testing sites. We process these tests in our lab and can get the results quickly. Your [facility] care team needs to get your results before you check in.  What should I do if I can't get tested at [facility]?  You can get tested somewhere else, but you'll need to take these extra steps:  4. Contact your family doctor or clinic to arrange your test.  5. Take the test within 4 days of your surgery or procedure. We can't accept tests older than 4 days.  6. Make sure your doctor or clinic faxes your results to [facility] at [phone].  If we don't get your results in time, we may have to postpone or cancel your treatment.  For informational purposes only. Not to replace the advice of your health care provider. Copyright   2020 [facility]. All rights reserved. Clinically reviewed by Infection Prevention and the [facility] COVID-19 Clinical Team. [employer] It Original [id number] - 07/20.

## 2020-09-28 NOTE — ANESTHESIA PREPROCEDURE EVALUATION
"Anesthesia Pre-Procedure Evaluation    Patient: Geovany Marte   MRN:     [id number] Gender:   male   Age:    54 year old :      1965        Preoperative Diagnosis: Screen for colon cancer [Z12.11]   Procedure(s):  COLONOSCOPY, WITH CO2 INSUFFLATION     LABS:  CBC:   Lab Results   Component Value Date    WBC 10.7 2020    WBC 11.0 2015    HGB 13.4 2020    HGB 13.1 (L) 2015    HCT 42.0 2020    HCT 40.0 2015     2020     2015     BMP:   Lab Results   Component Value Date     2020     10/30/2019    POTASSIUM 3.8 2020    POTASSIUM 4.0 10/30/2019    CHLORIDE 106 2020    CHLORIDE 101 10/30/2019    CO2 29 2020    CO2 28 10/30/2019    BUN 10 2020    BUN 8 10/30/2019    CR 0.85 2020    CR 0.84 10/30/2019     (H) 2020     (H) 10/30/2019     COAGS: No results found for: PTT, INR, FIBR  POC:   Lab Results   Component Value Date     (H) 2011     OTHER:   Lab Results   Component Value Date    A1C 6.2 (H) 2020    ROSALIO 8.9 2020    PHOS 3.7 2013    MAG 2.0 2013    ALBUMIN 3.8 2015    PROTTOTAL 7.6 2015     (H) 2015     (H) 2015    ALKPHOS 132 2015    BILITOTAL 1.1 2015    TSH 0.59 10/30/2019    T4 0.99 2016        Preop Vitals    BP Readings from Last 3 Encounters:   20 138/86   20 (!) 164/97   20 136/84    Pulse Readings from Last 3 Encounters:   20 83   20 76   10/30/19 64      Resp Readings from Last 3 Encounters:   20 20   20 16   10/30/19 18    SpO2 Readings from Last 3 Encounters:   20 97%   20 99%   10/30/19 99%      Temp Readings from Last 1 Encounters:   20 97.9  F (36.6  C) (Oral)    Ht Readings from Last 1 Encounters:   10/30/19 1.86 m (6' 1.23\")      Wt Readings from Last 1 Encounters:   20 (!) 156 kg (343 lb 14.4 oz)    Estimated " "body mass index is 45.09 kg/m  as calculated from the following:    Height as of 10/30/19: 1.86 m (6' 1.23\").    Weight as of 7/30/20: 156 kg (343 lb 14.4 oz).     LDA:  Peripheral IV 06/22/11 Left Hand (Active)   Number of days: 3386       ETT (adult) 8  (Active)   Number of days: 3386       Urethral Catheter Non-latex 16 fr (Active)   Number of days: 3386        Past Medical History:   Diagnosis Date     Anemia      Asthma      DM (diabetes mellitus) (H)      GERD (gastroesophageal reflux disease)      HTN      Hyperlipidemia      Morbid obesity (H)      Nonsenile cataract      Nuclear sclerosis 5/17/2013      Past Surgical History:   Procedure Laterality Date     ENT SURGERY      tonsils removed at 19 years old     ESOPHAGOSCOPY, GASTROSCOPY, DUODENOSCOPY (EGD), COMBINED N/A 3/18/2015    Procedure: COMBINED ESOPHAGOSCOPY, GASTROSCOPY, DUODENOSCOPY (EGD);  Surgeon: Jae Robles MD;  Location: [facility]     LAPAROSCOPIC BYPASS GASTRIC  6/22/2011    Procedure:LAPAROSCOPIC BYPASS GASTRIC; Surgeon:HANNAH SHEPARD; Location:[facility]      Allergies   Allergen Reactions     Lisinopril Anaphylaxis     Shellfish Allergy Difficulty breathing     Throat closing up     Shellfish-Derived Products      Throat swells up, hard time breathing                 PHYSICAL EXAM:   Mental Status/Neuro: A/A/O   Airway: Facies: Feasible  Mallampati: I  Mouth/Opening: Full  TM distance: > 6 cm  Neck ROM: Full   Respiratory:   Resp. Rate: Normal     Resp. Effort: Normal      CV:   Rate: Age appropriate  Edema: None   Comments:      Dental: Normal Dentition                Assessment:   ASA SCORE: 3    H&P: History and physical reviewed and following examination; no interval change.    NPO Status: NPO Appropriate     Plan:   Anes. Type:  MAC   Pre-Medication: None   Induction:  N/a   Airway: Native Airway   Access/Monitoring: PIV   Maintenance: N/a     Postop Plan:   Postop Pain: None  Postop Sedation/Airway: Not planned  Disposition: " Outpatient     PONV Management:    Prevention: Ondansetron, Dexamethasone     CONSENT: Direct conversation   Plan and risks discussed with: Patient          Comments for Plan/Consent:  MAC?    Very close to BMI cutoff                 Jeevan Washington MD     ANESTHESIA PREOP EVALUATION    PROCEDURE: Procedure(s):  COLONOSCOPY, WITH CO2 INSUFFLATION    HPI: Geovany Marte is a 54 year old male who presents for above procedure 2/2    PAST MEDICAL HISTORY:    Past Medical History:   Diagnosis Date     Anemia      Asthma      DM (diabetes mellitus) (H)      GERD (gastroesophageal reflux disease)      HTN      Hyperlipidemia      Morbid obesity (H)      Nonsenile cataract      Nuclear sclerosis 5/17/2013       PAST SURGICAL HISTORY:    Past Surgical History:   Procedure Laterality Date     ENT SURGERY      tonsils removed at 19 years old     ESOPHAGOSCOPY, GASTROSCOPY, DUODENOSCOPY (EGD), COMBINED N/A 3/18/2015    Procedure: COMBINED ESOPHAGOSCOPY, GASTROSCOPY, DUODENOSCOPY (EGD);  Surgeon: Jae Robles MD;  Location: [facility]     LAPAROSCOPIC BYPASS GASTRIC  6/22/2011    Procedure:LAPAROSCOPIC BYPASS GASTRIC; Surgeon:HANNAH SHEPARD; Location: OR       SOCIAL HISTORY:       Social History     Tobacco Use     Smoking status: Never Smoker     Smokeless tobacco: Never Used     Tobacco comment: 1994   Substance Use Topics     Alcohol use: Yes     Alcohol/week: 0.0 standard drinks     Comment: Once a week.       ALLERGIES:     Allergies   Allergen Reactions     Lisinopril Anaphylaxis     Shellfish Allergy Difficulty breathing     Throat closing up     Shellfish-Derived Products      Throat swells up, hard time breathing       MEDICATIONS:     (Not in a hospital admission)      Current Outpatient Medications   Medication Sig Dispense Refill     bisacodyl (DULCOLAX) 5 MG EC tablet Take 3 tablets (15 mg) by mouth See Admin Instructions --Take at 5 PM day prior to procedure 3 tablet 0     Na Sulfate-K Sulfate-Mg Sulf (SUPREP  BOWEL PREP KIT) solution Take 177 mLs (1 Bottle) by mouth See Admin Instructions -Split dose 2 day Regimen: The evening before your procedure: dilute one bottle with water to a total volume of 16oz (up to the fill line).  At 6pm,  drink the entire amount.  Drink 32 oz of water over the next hour. The morning of your procedure repeat both steps above using the second bottle.  Start 5 hours before your procedure and complete the prep at least 3 hours before you arrive. 2 Bottle 0     polyethylene glycol (GOLYTELY) 236 g suspension Take 4,000 mLs (4 L) by mouth See Admin Instructions --Drink half gallon (64oz) at 6pm on evening prior to procedure and second half on the morning of procedure (4 hours prior to procedure time) 4 L 0     Simethicone 125 MG TABS Take 125 mg by mouth See Admin Instructions --Take tablet after finishing second half of Golytely with half a glass of water. 1 tablet 0     Simethicone 125 MG TABS Take 125 mg by mouth See Admin Instructions --Take tablet after finishing second half of Suprep with half a glass of water. 1 tablet 0     ACE/ARB NOT PRESCRIBED, INTENTIONAL, continuous prn for other Reported on 4/3/2017       albuterol (PROAIR HFA) 108 (90 Base) MCG/ACT inhaler Inhale 2 puffs into the lungs every 4 hours as needed (for asthma symptoms) 8.5 g 1     amLODIPine (NORVASC) 10 MG tablet Take 1 tablet (10 mg) by mouth daily 90 tablet 1     ASPIRIN 81 MG PO TABS 1 TABLET DAILY (*)       azelastine (OPTIVAR) 0.05 % ophthalmic solution Apply 1 drop to eye 2 times daily 1 Bottle 11     blood glucose (NO BRAND SPECIFIED) lancets standard Use to test blood sugar 1 times daily or as directed. 100 each 3     blood glucose (NO BRAND SPECIFIED) test strip Use to test blood sugar 1 times daily or as directed. 100 each 3     Blood Glucose Monitoring Suppl (ACCU-CHEK GUIDE ME) w/Device KIT USE TO TEST BLOOD SUGAR 1 TIMES DAILY OR AS DIRECTED 1 kit 0     Blood Pressure Monitoring (BLOOD PRESSURE CUFF) MISC  Use to check blood pressure 3 - 4 times per week. 1 each 0     Calcium-Magnesium-Vitamin D (CITRACAL CALCIUM+D PO) 3 times daily. Take one tablet twice daily.       carvedilol (COREG) 25 MG tablet TAKE 1 TABLET BY MOUTH TWICE A DAY WITH MEALS 180 tablet 2     EPINEPHrine (AUVI-Q) 0.3 MG/0.3ML injection 2-pack Inject 0.3 mLs (0.3 mg) into the muscle as needed for anaphylaxis 4 mL 1     fluticasone (FLONASE) 50 MCG/ACT nasal spray Spray 1-2 sprays into both nostrils daily 16 g 11     glucose blood VI test strips (ONE TOUCH ULTRA TEST) strip by In Vitro route 2 times daily. 1 Box 12     hydrOXYzine (ATARAX) 50 MG tablet Take 1 tablet (50 mg) by mouth every 8 hours as needed for anxiety 60 tablet 3     omeprazole (PRILOSEC) 20 MG DR capsule TAKE 1 CAPSULE BY MOUTH EVERY DAY 90 capsule 1     ORDER FOR DME Injection Supplies for Vitamin B12: 3cc syringes w/ 27 gauge needles, 1/2 inch length 12 each 0     ORDER FOR DME One touch glucometer with supplies to replace old meter for testing twice daily 1 kit prn     pravastatin (PRAVACHOL) 80 MG tablet Take 1 tablet (80 mg) by mouth every evening 90 tablet 3       Current Outpatient Medications Ordered in Epic   Medication Sig Dispense Refill     bisacodyl (DULCOLAX) 5 MG EC tablet Take 3 tablets (15 mg) by mouth See Admin Instructions --Take at 5 PM day prior to procedure 3 tablet 0     Na Sulfate-K Sulfate-Mg Sulf (SUPREP BOWEL PREP KIT) solution Take 177 mLs (1 Bottle) by mouth See Admin Instructions -Split dose 2 day Regimen: The evening before your procedure: dilute one bottle with water to a total volume of 16oz (up to the fill line).  At 6pm,  drink the entire amount.  Drink 32 oz of water over the next hour. The morning of your procedure repeat both steps above using the second bottle.  Start 5 hours before your procedure and complete the prep at least 3 hours before you arrive. 2 Bottle 0     polyethylene glycol (GOLYTELY) 236 g suspension Take 4,000 mLs (4 L) by mouth  See Admin Instructions --Drink half gallon (64oz) at 6pm on evening prior to procedure and second half on the morning of procedure (4 hours prior to procedure time) 4 L 0     Simethicone 125 MG TABS Take 125 mg by mouth See Admin Instructions --Take tablet after finishing second half of Golytely with half a glass of water. 1 tablet 0     Simethicone 125 MG TABS Take 125 mg by mouth See Admin Instructions --Take tablet after finishing second half of Suprep with half a glass of water. 1 tablet 0     ACE/ARB NOT PRESCRIBED, INTENTIONAL, continuous prn for other Reported on 4/3/2017       albuterol (PROAIR HFA) 108 (90 Base) MCG/ACT inhaler Inhale 2 puffs into the lungs every 4 hours as needed (for asthma symptoms) 8.5 g 1     amLODIPine (NORVASC) 10 MG tablet Take 1 tablet (10 mg) by mouth daily 90 tablet 1     ASPIRIN 81 MG PO TABS 1 TABLET DAILY (*)       azelastine (OPTIVAR) 0.05 % ophthalmic solution Apply 1 drop to eye 2 times daily 1 Bottle 11     blood glucose (NO BRAND SPECIFIED) lancets standard Use to test blood sugar 1 times daily or as directed. 100 each 3     blood glucose (NO BRAND SPECIFIED) test strip Use to test blood sugar 1 times daily or as directed. 100 each 3     Blood Glucose Monitoring Suppl (ACCU-CHEK GUIDE ME) w/Device KIT USE TO TEST BLOOD SUGAR 1 TIMES DAILY OR AS DIRECTED 1 kit 0     Blood Pressure Monitoring (BLOOD PRESSURE CUFF) MISC Use to check blood pressure 3 - 4 times per week. 1 each 0     Calcium-Magnesium-Vitamin D (CITRACAL CALCIUM+D PO) 3 times daily. Take one tablet twice daily.       carvedilol (COREG) 25 MG tablet TAKE 1 TABLET BY MOUTH TWICE A DAY WITH MEALS 180 tablet 2     EPINEPHrine (AUVI-Q) 0.3 MG/0.3ML injection 2-pack Inject 0.3 mLs (0.3 mg) into the muscle as needed for anaphylaxis 4 mL 1     fluticasone (FLONASE) 50 MCG/ACT nasal spray Spray 1-2 sprays into both nostrils daily 16 g 11     glucose blood VI test strips (ONE TOUCH ULTRA TEST) strip by In Vitro route 2  times daily. 1 Box 12     hydrOXYzine (ATARAX) 50 MG tablet Take 1 tablet (50 mg) by mouth every 8 hours as needed for anxiety 60 tablet 3     omeprazole (PRILOSEC) 20 MG DR capsule TAKE 1 CAPSULE BY MOUTH EVERY DAY 90 capsule 1     ORDER FOR DME Injection Supplies for Vitamin B12: 3cc syringes w/ 27 gauge needles, 1/2 inch length 12 each 0     ORDER FOR DME One touch glucometer with supplies to replace old meter for testing twice daily 1 kit prn     pravastatin (PRAVACHOL) 80 MG tablet Take 1 tablet (80 mg) by mouth every evening 90 tablet 3     No current Epic-ordered facility-administered medications on file.        PHYSICAL EXAM:    Vitals: T Data Unavailable, P Data Unavailable, BP Data Unavailable, R Data Unavailable, SpO2  , Weight   Wt Readings from Last 2 Encounters:   07/30/20 (!) 156 kg (343 lb 14.4 oz)   07/20/20 (!) 155.6 kg (343 lb)       See doc flowsheet    NPO STATUS: see doc flowsheet    LABS:    BMP:  Recent Labs   Lab Test 04/16/20  1500      POTASSIUM 3.8   CHLORIDE 106   CO2 29   BUN 10   CR 0.85   *   ROSALIO 8.9       LFTs:   Recent Labs   Lab Test 02/17/15  1413   PROTTOTAL 7.6   ALBUMIN 3.8   BILITOTAL 1.1   ALKPHOS 132   *   *       CBC:   Recent Labs   Lab Test 04/16/20  1500   WBC 10.7   RBC 5.19   HGB 13.4   HCT 42.0   MCV 81   MCH 25.8*   MCHC 31.9   RDW 15.5*          Coags:  No results for input(s): INR, PTT, FIBR in the last 46945 hours.    Imaging:  No orders to display       Jeevan Washington MD  Anesthesiology Staff  Pager [phone]    9/28/2020  1:12 PM

## 2020-09-28 NOTE — PROGRESS NOTES
"Geovany Marte is a 54 year old male who is being evaluated via a billable video visit.      The patient has been notified of following:     \"This video visit will be conducted via a call between you and your physician/provider. We have found that certain health care needs can be provided without the need for an in-person physical exam.  This service lets us provide the care you need with a video conversation.  If a prescription is necessary we can send it directly to your pharmacy.  If lab work is needed we can place an order for that and you can then stop by our lab to have the test done at a later time.    Video visits are billed at different rates depending on your insurance coverage.  Please reach out to your insurance provider with any questions.    If during the course of the call the physician/provider feels a video visit is not appropriate, you will not be charged for this service.\"    Patient has given verbal consent for Video visit? Yes  How would you like to obtain your AVS? MyChart  If you are dropped from the video visit, the video invite should be resent to: Text to cell phone: [phone]  Will anyone else be joining your video visit? No      Subjective     Geovany Marte is a 54 year old male who presents today via video visit for the following health issues:    HPI    Preop Colonoscopy    How many servings of fruits and vegetables do you eat daily?  0-1    On average, how many sweetened beverages do you drink each day (Examples: soda, juice, sweet tea, etc.  Do NOT count diet or artificially sweetened beverages)?   1    How many days per week do you exercise enough to make your heart beat faster? 3 or less    How many minutes a day do you exercise enough to make your heart beat faster? 10 - 19    How many days per week do you miss taking your medication? 0    Video Start Time: 11:23 AM    Vitals:  No vitals were obtained today due to virtual visit.    Physical Exam     GENERAL: Healthy, alert and no " distress, obese  EYES: Eyes grossly normal to inspection.  No discharge or erythema, or obvious scleral/conjunctival abnormalities.  RESP: No audible wheeze, cough, or visible cyanosis.  No visible retractions or increased work of breathing.    SKIN: Visible skin clear. No significant rash, abnormal pigmentation or lesions.  NEURO: Cranial nerves grossly intact.  Mentation and speech appropriate for age.  PSYCH: Mentation appears normal, affect normal/bright, judgement and insight intact, normal speech and appearance well-groomed.      Orders Only on 09/25/2020   Component Date Value Ref Range Status     COVID-19 Virus PCR to [facility] - So* 09/25/2020 Nasopharyngeal   Final     COVID-19 Virus PCR to [facility] - Re* 09/25/2020 Not Detected   Final    Comment: Collection of multiple specimens from the same patient may be necessary to   detect the virus. The possibility of a false negative should be considered if   the patient's recent exposure or clinical presentation suggests 2019 nCOV   infection and diagnostic tests for other causes of illness are negative.   Repeat testing may be considered in this setting.  Patient sample was heat inactivated and amplified using the HDPCR SARS-CoV-2   assay ([employer] The HDPCRTM SARS-CoV-2 assay is a reverse   transcription real-time polymerase chain reaction (qRT-PCR) test intended for   the qualitative detection of nucleic acid  from SARS-CoV-2 in human nasopharyngeal swabs, oropharyngeal swabs, anterior   nasal swabs, mid-turbinate nasal swabs as well as nasal aspirate, nasal wash,   and bronchoalveolar lavage (BAL) specimens from individuals who are suspected   of COVID-19 by their healthcare provider.  A negative result does not rule out the presence of real-time PCR inhibitors   in the sp                           ecimen or COVID-19 RNA in concentrations below the limit of detection   of the assay. The possibility of a false negative should be considered if the    patients recent exposure or clinical presentation suggests COVID-19.   Additional testing or repeat testing requires consultation with the   laboratory.  Nasopharyngeal specimen is the preferred choice for swab-based SARS CoV2   testing. When collection of a nasopharyngeal swab is not possible the   following are acceptable alternatives:  an oropharyngeal (OP) specimen collected by a healthcare professional, or a   nasal mid-turbinate (NMT) swab collected by a healthcare professional or by   onsite self-collection (using a flocked tapered swab), or an anterior nares   specimen collected by a healthcare professional or by onsite self-collection   (using a round foam swab). (Centers for Disease Control)  Testing performed by [facility] ([facility]) [address] [facility] [address]  The test performance characteristics were determined by [facility]. It has not been   cleared or approved by the FDA.  The laboratory is regulated under the Clinical Laboratory Improvement   Amendments of 1988 (CLIA-88) as qualified to perform high-complexity testing.   This test is used for clinical purposes. It should not be regarded as   investigational or for research.               Video-Visit Details    Type of service:  Video Visit    Video End Time:11:45 AM    Originating Location (pt. Location): Home    Distant Location (provider location):  [facility]     Platform used for Video Visit: [address] [address]  Phone: [phone]  Primary Provider: Mona Lin  Pre-op Performing Provider: MONA LIN    PREOPERATIVE EVALUATION:  Today's date: 9/28/2020    Geovany Marte is a 54 year old male who presents for a preoperative evaluation.    Surgical Information:  No flowsheet data found.  Fax number for surgical  facility: Note does not need to be faxed, will be available electronically in Epic.  Type of Anesthesia Anticipated: Local with MAC    Subjective     HPI related to upcoming procedure: screen colonoscopy pre-operative exam.  No flowsheet data found.  Patient does not have a Health Care Directive or Living Will: Discussed advance care planning with patient; information given to patient to review.    [place] reviewed:  reviewed- no concerns    DIABETES - Patient has a longstanding history of DiabetesType Type II . Patient is being treated with diet and denies significant side effects. Control has been good. Complicating factors include but are not limited to: hypertension and hyperlipidemia.     HYPERLIPIDEMIA - Patient has a long history of significant Hyperlipidemia requiring medication for treatment with recent good control. Patient reports no problems or side effects with the medication.     HYPERTENSION - Patient has longstanding history of HTN , currently denies any symptoms referable to elevated blood pressure. Specifically denies chest pain, palpitations, dyspnea, orthopnea, PND or peripheral edema. Blood pressure readings have been in normal range. Current medication regimen is as listed below. Patient denies any side effects of medication.       Review of Systems  Constitutional, neuro, ENT, endocrine, pulmonary, cardiac, gastrointestinal, genitourinary, musculoskeletal, integument and psychiatric systems are negative, except as otherwise noted.    Patient Active Problem List    Diagnosis Date Noted     Allergic rhinitis due to animals 12/10/2018     Priority: Medium     Allergic rhinitis due to mold 12/10/2018     Priority: Medium     Retrograde ejaculation 03/07/2018     Priority: Medium     Bariatric surgery status 03/07/2018     Priority: Medium     Plantar warts 03/07/2018     Priority: Medium     Morbid obesity (H) 10/21/2015     Priority: Medium     Seasonal allergic rhinitis  05/20/2015     Priority: Medium     Gout 07/07/2014     Priority: Medium     Problem list name updated by automated process. Provider to review       Glaucoma suspect 05/20/2014     Priority: Medium     Erectile dysfunction 12/19/2013     Priority: Medium     Nuclear sclerosis 05/17/2013     Priority: Medium     Dry eye syndrome 12/06/2011     Priority: Medium     Hypertension goal BP (blood pressure) < 140/90 06/20/2011     Priority: Medium     Intermittent asthma 04/28/2011     Priority: Medium     Disturbance of skin sensation 03/09/2011     Priority: Medium     Hyperlipidemia LDL goal <100 11/30/2010     Priority: Medium     Type 2 diabetes, HbA1c goal < 7% (H) 11/30/2010     Priority: Medium      Past Medical History:   Diagnosis Date     Anemia      Asthma      DM (diabetes mellitus) (H)      GERD (gastroesophageal reflux disease)      HTN      Hyperlipidemia      Morbid obesity (H)      Nonsenile cataract      Nuclear sclerosis 5/17/2013     Past Surgical History:   Procedure Laterality Date     ENT SURGERY      tonsils removed at 19 years old     ESOPHAGOSCOPY, GASTROSCOPY, DUODENOSCOPY (EGD), COMBINED N/A 3/18/2015    Procedure: COMBINED ESOPHAGOSCOPY, GASTROSCOPY, DUODENOSCOPY (EGD);  Surgeon: Jae Robles MD;  Location: [facility]     LAPAROSCOPIC BYPASS GASTRIC  6/22/2011    Procedure:LAPAROSCOPIC BYPASS GASTRIC; Surgeon:HANNAH SHEPARD; Location:[facility]     Current Outpatient Medications   Medication Sig Dispense Refill     ACE/ARB NOT PRESCRIBED, INTENTIONAL, continuous prn for other Reported on 4/3/2017       albuterol (PROAIR HFA) 108 (90 Base) MCG/ACT inhaler Inhale 2 puffs into the lungs every 4 hours as needed (for asthma symptoms) 8.5 g 1     amLODIPine (NORVASC) 10 MG tablet Take 1 tablet (10 mg) by mouth daily 90 tablet 1     ASPIRIN 81 MG PO TABS 1 TABLET DAILY (*)       azelastine (OPTIVAR) 0.05 % ophthalmic solution Apply 1 drop to eye 2 times daily 1 Bottle 11     bisacodyl (DULCOLAX)  5 MG EC tablet Take 3 tablets (15 mg) by mouth See Admin Instructions --Take at 5 PM day prior to procedure 3 tablet 0     blood glucose (NO BRAND SPECIFIED) lancets standard Use to test blood sugar 1 times daily or as directed. 100 each 3     blood glucose (NO BRAND SPECIFIED) test strip Use to test blood sugar 1 times daily or as directed. 100 each 3     Blood Glucose Monitoring Suppl (ACCU-CHEK GUIDE ME) w/Device KIT USE TO TEST BLOOD SUGAR 1 TIMES DAILY OR AS DIRECTED 1 kit 0     Blood Pressure Monitoring (BLOOD PRESSURE CUFF) MISC Use to check blood pressure 3 - 4 times per week. 1 each 0     Calcium-Magnesium-Vitamin D (CITRACAL CALCIUM+D PO) 3 times daily. Take one tablet twice daily.       carvedilol (COREG) 25 MG tablet TAKE 1 TABLET BY MOUTH TWICE A DAY WITH MEALS 180 tablet 2     EPINEPHrine (AUVI-Q) 0.3 MG/0.3ML injection 2-pack Inject 0.3 mLs (0.3 mg) into the muscle as needed for anaphylaxis 4 mL 1     fluticasone (FLONASE) 50 MCG/ACT nasal spray Spray 1-2 sprays into both nostrils daily 16 g 11     glucose blood VI test strips (ONE TOUCH ULTRA TEST) strip by In Vitro route 2 times daily. 1 Box 12     hydrOXYzine (ATARAX) 50 MG tablet Take 1 tablet (50 mg) by mouth every 8 hours as needed for anxiety 60 tablet 3     Na Sulfate-K Sulfate-Mg Sulf (SUPREP BOWEL PREP KIT) solution Take 177 mLs (1 Bottle) by mouth See Admin Instructions -Split dose 2 day Regimen: The evening before your procedure: dilute one bottle with water to a total volume of 16oz (up to the fill line).  At 6pm,  drink the entire amount.  Drink 32 oz of water over the next hour. The morning of your procedure repeat both steps above using the second bottle.  Start 5 hours before your procedure and complete the prep at least 3 hours before you arrive. 2 Bottle 0     omeprazole (PRILOSEC) 20 MG DR capsule TAKE 1 CAPSULE BY MOUTH EVERY DAY 90 capsule 1     ORDER FOR DME Injection Supplies for Vitamin B12: 3cc syringes w/ 27 gauge needles,  1/2 inch length 12 each 0     ORDER FOR DME One touch glucometer with supplies to replace old meter for testing twice daily 1 kit prn     polyethylene glycol (GOLYTELY) 236 g suspension Take 4,000 mLs (4 L) by mouth See Admin Instructions --Drink half gallon (64oz) at 6pm on evening prior to procedure and second half on the morning of procedure (4 hours prior to procedure time) 4 L 0     pravastatin (PRAVACHOL) 80 MG tablet Take 1 tablet (80 mg) by mouth every evening 90 tablet 3     Simethicone 125 MG TABS Take 125 mg by mouth See Admin Instructions --Take tablet after finishing second half of Golytely with half a glass of water. 1 tablet 0     Simethicone 125 MG TABS Take 125 mg by mouth See Admin Instructions --Take tablet after finishing second half of Suprep with half a glass of water. 1 tablet 0       Allergies   Allergen Reactions     Lisinopril Anaphylaxis     Shellfish Allergy Difficulty breathing     Throat closing up     Shellfish-Derived Products      Throat swells up, hard time breathing        Social History     Tobacco Use     Smoking status: Never Smoker     Smokeless tobacco: Never Used     Tobacco comment: 1994   Substance Use Topics     Alcohol use: Yes     Alcohol/week: 0.0 standard drinks     Comment: Once a week.       History   Drug Use No            Objective   There were no vitals taken for this visit.  Physical Exam    Recent Labs   Lab Test 04/16/20  1500 10/30/19  0944   HGB 13.4  --      --     137   POTASSIUM 3.8 4.0   CR 0.85 0.84   A1C 6.2* 6.3*        PRE-OP Diagnostics:  Labs pending at this time.  Results will be reviewed when available.  No EKG required for low risk surgery (cataract, skin procedure, breast biopsy, etc).       Assessment & Plan   The proposed surgical procedure is considered LOW risk.    REVISED CARDIAC RISK INDEX  The patient has the following serious cardiovascular risks for perioperative complications:  No serious cardiac risks = 0  points    INTERPRETATION: 0 points: Class I (very low risk - 0.4% complication rate)       1. Preoperative examination  Check labs today for procedure tomorrow. No need to hold medication other than ASA which has been held for 7 days.  - Random Glucose; Future  - Hemoglobin A1c; Future    2. Screen for colon cancer  Screening colonoscopy    3. Type 2 diabetes, HbA1c goal < 7% (H)  Previously controlled - labs today  - Random Glucose; Future  - Hemoglobin A1c; Future    4. Hypertension goal BP (blood pressure) < 140/90  Previously controlled - continue current medication    5. Bariatric surgery status  Caution as necessary with prep.    6. Hyperlipidemia LDL goal <100  Stable - continue statin    7. Mild intermittent asthma without complication  Stable - continue prn albuterol      The patient has the following additional risks and recommendations for perioperative complications:     - Morbid obesity (BMI >40)     MEDICATION INSTRUCTIONS:  Patient is to take all scheduled medications on the day of surgery EXCEPT for modifications listed below:    Anticoagulant or Antiplatelet Medication Use   - ASPIRIN: Discontinue aspirin 7-10 days prior to procedure to reduce bleeding risk. It should be resumed postoperatively.     RECOMMENDATION:  APPROVAL GIVEN to proceed with proposed procedure, without further diagnostic evaluation.    No follow-ups on file.    Signed Electronically by: Mona Scales MD    Copy of this evaluation report is provided to requesting physician.    Preop [facility] Preop Guidelines    Revised Cardiac Risk Index

## 2020-09-29 ENCOUNTER — HOSPITAL ENCOUNTER (OUTPATIENT)
Facility: AMBULATORY SURGERY CENTER | Age: 55
Discharge: HOME | End: 2020-09-29
Attending: INTERNAL MEDICINE | Admitting: INTERNAL MEDICINE
Payer: COMMERCIAL

## 2020-09-29 ENCOUNTER — ANESTHESIA (OUTPATIENT)
Dept: SURGERY | Facility: AMBULATORY SURGERY CENTER | Age: 55
End: 2020-09-29
Payer: COMMERCIAL

## 2020-09-29 VITALS
SYSTOLIC BLOOD PRESSURE: 145 MMHG | OXYGEN SATURATION: 96 % | RESPIRATION RATE: 20 BRPM | DIASTOLIC BLOOD PRESSURE: 83 MMHG | TEMPERATURE: 97.8 F

## 2020-09-29 DIAGNOSIS — Z12.11 SCREEN FOR COLON CANCER: Primary | ICD-10-CM

## 2020-09-29 LAB
COLONOSCOPY: NORMAL
GLUCOSE BLDC GLUCOMTR-MCNC: 165 MG/DL (ref 70–99)

## 2020-09-29 PROCEDURE — 45385 COLONOSCOPY W/LESION REMOVAL: CPT

## 2020-09-29 PROCEDURE — G8907 PT DOC NO EVENTS ON DISCHARG: HCPCS

## 2020-09-29 PROCEDURE — 88305 TISSUE EXAM BY PATHOLOGIST: CPT | Performed by: INTERNAL MEDICINE

## 2020-09-29 PROCEDURE — G8918 PT W/O PREOP ORDER IV AB PRO: HCPCS

## 2020-09-29 RX ORDER — ONDANSETRON 4 MG/1
4 TABLET, ORALLY DISINTEGRATING ORAL EVERY 30 MIN PRN
Status: DISCONTINUED | OUTPATIENT
Start: 2020-09-29 | End: 2020-09-30 | Stop reason: HOSPADM

## 2020-09-29 RX ORDER — NALOXONE HYDROCHLORIDE 0.4 MG/ML
.1-.4 INJECTION, SOLUTION INTRAMUSCULAR; INTRAVENOUS; SUBCUTANEOUS
Status: DISCONTINUED | OUTPATIENT
Start: 2020-09-29 | End: 2020-09-30 | Stop reason: HOSPADM

## 2020-09-29 RX ORDER — OXYCODONE HYDROCHLORIDE 5 MG/1
5 TABLET ORAL EVERY 4 HOURS PRN
Status: DISCONTINUED | OUTPATIENT
Start: 2020-09-29 | End: 2020-09-30 | Stop reason: HOSPADM

## 2020-09-29 RX ORDER — SODIUM CHLORIDE, SODIUM LACTATE, POTASSIUM CHLORIDE, CALCIUM CHLORIDE 600; 310; 30; 20 MG/100ML; MG/100ML; MG/100ML; MG/100ML
INJECTION, SOLUTION INTRAVENOUS CONTINUOUS
Status: DISCONTINUED | OUTPATIENT
Start: 2020-09-29 | End: 2020-09-30 | Stop reason: HOSPADM

## 2020-09-29 RX ORDER — FLUMAZENIL 0.1 MG/ML
0.2 INJECTION, SOLUTION INTRAVENOUS
Status: SHIPPED | OUTPATIENT
Start: 2020-09-29 | End: 2020-09-29

## 2020-09-29 RX ORDER — LIDOCAINE HYDROCHLORIDE 20 MG/ML
INJECTION, SOLUTION INFILTRATION; PERINEURAL PRN
Status: DISCONTINUED | OUTPATIENT
Start: 2020-09-29 | End: 2020-09-29

## 2020-09-29 RX ORDER — ONDANSETRON 2 MG/ML
4 INJECTION INTRAMUSCULAR; INTRAVENOUS EVERY 30 MIN PRN
Status: DISCONTINUED | OUTPATIENT
Start: 2020-09-29 | End: 2020-09-30 | Stop reason: HOSPADM

## 2020-09-29 RX ORDER — LIDOCAINE 40 MG/G
CREAM TOPICAL
Status: DISCONTINUED | OUTPATIENT
Start: 2020-09-29 | End: 2020-09-30 | Stop reason: HOSPADM

## 2020-09-29 RX ORDER — PROPOFOL 10 MG/ML
INJECTION, EMULSION INTRAVENOUS CONTINUOUS PRN
Status: DISCONTINUED | OUTPATIENT
Start: 2020-09-29 | End: 2020-09-29

## 2020-09-29 RX ORDER — MEPERIDINE HYDROCHLORIDE 25 MG/ML
12.5 INJECTION INTRAMUSCULAR; INTRAVENOUS; SUBCUTANEOUS
Status: DISCONTINUED | OUTPATIENT
Start: 2020-09-29 | End: 2020-09-30 | Stop reason: HOSPADM

## 2020-09-29 RX ORDER — ONDANSETRON 2 MG/ML
4 INJECTION INTRAMUSCULAR; INTRAVENOUS EVERY 6 HOURS PRN
Status: DISCONTINUED | OUTPATIENT
Start: 2020-09-29 | End: 2020-09-30 | Stop reason: HOSPADM

## 2020-09-29 RX ORDER — ONDANSETRON 4 MG/1
4 TABLET, ORALLY DISINTEGRATING ORAL EVERY 6 HOURS PRN
Status: DISCONTINUED | OUTPATIENT
Start: 2020-09-29 | End: 2020-09-30 | Stop reason: HOSPADM

## 2020-09-29 RX ORDER — ONDANSETRON 2 MG/ML
4 INJECTION INTRAMUSCULAR; INTRAVENOUS
Status: DISCONTINUED | OUTPATIENT
Start: 2020-09-29 | End: 2020-09-30 | Stop reason: HOSPADM

## 2020-09-29 RX ORDER — FENTANYL CITRATE 50 UG/ML
25-50 INJECTION, SOLUTION INTRAMUSCULAR; INTRAVENOUS EVERY 5 MIN PRN
Status: DISCONTINUED | OUTPATIENT
Start: 2020-09-29 | End: 2020-09-30 | Stop reason: HOSPADM

## 2020-09-29 RX ORDER — PROPOFOL 10 MG/ML
INJECTION, EMULSION INTRAVENOUS PRN
Status: DISCONTINUED | OUTPATIENT
Start: 2020-09-29 | End: 2020-09-29

## 2020-09-29 RX ADMIN — PROPOFOL 120 MCG/KG/MIN: 10 INJECTION, EMULSION INTRAVENOUS at 09:40

## 2020-09-29 RX ADMIN — PROPOFOL 30 MG: 10 INJECTION, EMULSION INTRAVENOUS at 09:39

## 2020-09-29 RX ADMIN — PROPOFOL 100 MG: 10 INJECTION, EMULSION INTRAVENOUS at 09:38

## 2020-09-29 RX ADMIN — SODIUM CHLORIDE, SODIUM LACTATE, POTASSIUM CHLORIDE, CALCIUM CHLORIDE: 600; 310; 30; 20 INJECTION, SOLUTION INTRAVENOUS at 09:19

## 2020-09-29 RX ADMIN — LIDOCAINE HYDROCHLORIDE 40 MG: 20 INJECTION, SOLUTION INFILTRATION; PERINEURAL at 09:37

## 2020-09-29 ASSESSMENT — ASTHMA QUESTIONNAIRES: ACT_TOTALSCORE: 24

## 2020-09-29 NOTE — INTERVAL H&P NOTE
Patient presents today for colonoscopy  PE:  Gen: obese male in NAD  HEENT: NCAT  Neck: normal ROM  CV: RRR, no m/r/g  Resp: CTA b/l  Neuro: grossly intact  Psych: appropriate mood and affect    A/P:  Colonoscopy today with MAC

## 2020-09-29 NOTE — RESULT ENCOUNTER NOTE
Mr. Marte,    Your diabetes has worsened slightly.  Try to reduce your carbohydrate intake and increase your exercise as you are able.    Please contact the clinic if you have additional questions.  Thank you.    Sincerely,    Mona Scales MD

## 2020-09-29 NOTE — ANESTHESIA CARE TRANSFER NOTE
Patient: Geovany Marte    Procedure(s):  COLONOSCOPY, WITH CO2 INSUFFLATION  Colonoscopy, With Polypectomy And Biopsy  Colonoscopy, Flexible, With Lesion Removal Using Snare    Diagnosis: Screen for colon cancer [Z12.11]  Diagnosis Additional Information: No value filed.    Anesthesia Type:   MAC     Note:  Airway :Room Air  Patient transferred to:Phase II  Comments: Patient awake, alert, and oriented. SpO2 97%.  No apparent anesthesia complications. Handoff Report: Identifed the Patient, Identified the Reponsible Provider, Reviewed the pertinent medical history, Discussed the surgical course, Reviewed Intra-OP anesthesia mangement and issues during anesthesia, Set expectations for post-procedure period and Allowed opportunity for questions and acknowledgement of understanding      Vitals: (Last set prior to Anesthesia Care Transfer)    CRNA VITALS  9/29/2020 0954 - 9/29/2020 1027      9/29/2020             SpO2:  100 %                Electronically Signed By: GEOVANNI Westbrook CRNA  September 29, 2020  10:27 AM

## 2020-09-29 NOTE — DISCHARGE INSTRUCTIONS
[facility]  Same-Day Surgery   Adult Discharge Orders & Instructions   For 24 hours after surgery  1. Get plenty of rest.  A responsible adult must stay with you for at least 24 hours after you leave the hospital.   2. Do not drive or use heavy equipment.  If you have weakness or tingling, don't drive or use heavy equipment until this feeling goes away.  3. Do not drink alcohol.  4. Avoid strenuous or risky activities.  Ask for help when climbing stairs.   5. You may feel lightheaded.  IF so, sit for a few minutes before standing.  Have someone help you get up.   6. If you have nausea (feel sick to your stomach): Drink only clear liquids such as apple juice, ginger ale, broth or 7-Up.  Rest may also help.  Be sure to drink enough fluids.  Move to a regular diet as you feel able.  7. You may have a slight fever. Call the doctor if your fever is over 100 F (37.7 C) (taken under the tongue) or lasts longer than 24 hours.  8. You may have a dry mouth, a sore throat, muscle aches or trouble sleeping.  These should go away after 24 hours.  9. Do not make important or legal decisions.   Call your doctor for any of the followin.  Signs of infection (fever, growing tenderness at the surgery site, a large amount of drainage or bleeding, severe pain, foul-smelling drainage, redness, swelling).    2. It has been over 8 to 10 hours since surgery and you are still not able to urinate (pass water).    3.  Headache for over 24 hours.      To contact a doctor, call __[phone]_________________________     [place] Same-Day [facility]   Adult Discharge Orders & Instructions     For 24 hours after surgery    1. Get plenty of rest.  A responsible adult must stay with you for at least 24 hours after you leave the hospital.   2. Do not drive or use heavy equipment.  If you have weakness or tingling, don't drive or use heavy equipment until this feeling goes away.  3. Do not drink alcohol.  4. Avoid strenuous  or risky activities.  Ask for help when climbing stairs.   5. You may feel lightheaded.  IF so, sit for a few minutes before standing.  Have someone help you get up.   6. If you have nausea (feel sick to your stomach): Drink only clear liquids such as apple juice, ginger ale, broth or 7-Up.  Rest may also help.  Be sure to drink enough fluids.  Move to a regular diet as you feel able.  7. You may have a slight fever. Call the doctor if your fever is over 100 F (37.7 C) (taken under the tongue) or lasts longer than 24 hours.  8. You may have a dry mouth, a sore throat, muscle aches or trouble sleeping.  These should go away after 24 hours.  9. Do not make important or legal decisions.     Call your doctor for any of the followin.  Signs of infection (fever, growing tenderness at the surgery site, a large amount of drainage or bleeding, severe pain, foul-smelling drainage, redness, swelling).    2. It has been over 8 to 10 hours since surgery and you are still not able to urinate (pass water).    3.  Headache for over 24 hours.    4.  Numbness, tingling or weakness the day after surgery (if you had spinal anesthesia).                  5. Signs of Covid-19 infection (temperature over 100 degrees, shortness of breath, cough, loss of taste/smell, generalized body aches, persistent headache,                  chills, sore throat, nausea/vomiting/diarrhea).    To contact a doctor, call ____[phone]____________________________________

## 2020-09-29 NOTE — ANESTHESIA POSTPROCEDURE EVALUATION
Anesthesia POST Procedure Evaluation    Patient: Geovany Marte   MRN:     [id number] Gender:   male   Age:    54 year old :      1965        Preoperative Diagnosis: Screen for colon cancer [Z12.11]   Procedure(s):  COLONOSCOPY, WITH CO2 INSUFFLATION  Colonoscopy, With Polypectomy And Biopsy  Colonoscopy, Flexible, With Lesion Removal Using Snare   Postop Comments: No value filed.     Anesthesia Type: MAC       Disposition: Outpatient   Postop Pain Control: Uneventful            Sign Out: Well controlled pain   PONV: No   Neuro/Psych: Uneventful            Sign Out: Acceptable/Baseline neuro status   Airway/Respiratory: Uneventful            Sign Out: AIRWAY IN SITU/Resp. Support   CV/Hemodynamics: Uneventful            Sign Out: Acceptable CV status   Other NRE: NONE   DID A NON-ROUTINE EVENT OCCUR? No         Last Anesthesia Record Vitals:  CRNA VITALS  2020 0954 - 2020 1035      2020             SpO2:  100 %          Last PACU Vitals:  Vitals Value Taken Time   BP     Temp     Pulse     Resp     SpO2     Temp src     NIBP 115/54 2020 10:21 AM   Pulse     SpO2 100 % 2020 10:22 AM   Resp     Temp     Ht Rate 93 2020 10:21 AM   Temp 2           Electronically Signed By: Jeevan Washington MD, 2020, 10:35 AM

## 2020-10-04 LAB — COPATH REPORT: NORMAL

## 2020-10-08 DIAGNOSIS — E11.9 TYPE 2 DIABETES, HBA1C GOAL < 7% (H): Primary | ICD-10-CM

## 2020-10-10 DIAGNOSIS — J30.2 SEASONAL ALLERGIC RHINITIS, UNSPECIFIED TRIGGER: ICD-10-CM

## 2020-10-10 DIAGNOSIS — I10 HYPERTENSION GOAL BP (BLOOD PRESSURE) < 140/90: ICD-10-CM

## 2020-10-12 RX ORDER — BLOOD SUGAR DIAGNOSTIC
STRIP MISCELLANEOUS
Qty: 100 STRIP | Refills: 2 | Status: SHIPPED | OUTPATIENT
Start: 2020-10-12 | End: 2021-06-30

## 2020-10-12 NOTE — TELEPHONE ENCOUNTER
Prescription approved per [facility] [place].      Ruby Locke RN, BSN, PHN

## 2020-10-13 RX ORDER — FLUTICASONE PROPIONATE 50 MCG
1-2 SPRAY, SUSPENSION (ML) NASAL DAILY
Qty: 48 ML | Refills: 2 | Status: SHIPPED | OUTPATIENT
Start: 2020-10-13 | End: 2021-06-30

## 2020-10-13 RX ORDER — AMLODIPINE BESYLATE 10 MG/1
TABLET ORAL
Qty: 90 TABLET | Refills: 1 | Status: SHIPPED | OUTPATIENT
Start: 2020-10-13 | End: 2021-04-07

## 2020-10-13 NOTE — TELEPHONE ENCOUNTER
Routing refill request to provider for review/approval because:   Amlodipine            Pres[place] [facility] [place]  Jonas Ratliff RN

## 2020-11-16 ENCOUNTER — MYC MEDICAL ADVICE (OUTPATIENT)
Dept: FAMILY MEDICINE | Facility: CLINIC | Age: 55
End: 2020-11-16

## 2020-11-23 ENCOUNTER — VIRTUAL VISIT (OUTPATIENT)
Dept: FAMILY MEDICINE | Facility: CLINIC | Age: 55
End: 2020-11-23
Payer: COMMERCIAL

## 2020-11-23 DIAGNOSIS — E66.01 MORBID OBESITY (H): ICD-10-CM

## 2020-11-23 DIAGNOSIS — E11.9 TYPE 2 DIABETES, HBA1C GOAL < 7% (H): ICD-10-CM

## 2020-11-23 DIAGNOSIS — U07.1 INFECTION DUE TO 2019 NOVEL CORONAVIRUS: Primary | ICD-10-CM

## 2020-11-23 PROCEDURE — 99213 OFFICE O/P EST LOW 20 MIN: CPT | Mod: 95 | Performed by: FAMILY MEDICINE

## 2020-11-23 NOTE — PROGRESS NOTES
"Geovany Marte is a 55 year old male who is being evaluated via a billable telephone visit.      The patient has been notified of following:     \"This telephone visit will be conducted via a call between you and your physician/provider. We have found that certain health care needs can be provided without the need for a physical exam.  This service lets us provide the care you need with a short phone conversation.  If a prescription is necessary we can send it directly to your pharmacy.  If lab work is needed we can place an order for that and you can then stop by our lab to have the test done at a later time.    Telephone visits are billed at different rates depending on your insurance coverage. During this emergency period, for some insurers they may be billed the same as an in-person visit.  Please reach out to your insurance provider with any questions.    If during the course of the call the physician/provider feels a telephone visit is not appropriate, you will not be charged for this service.\"    Patient has given verbal consent for Telephone visit?  Yes    What phone number would you like to be contacted at? See appt notes    How would you like to obtain your AVS? Maryhart    Subjective     Geovany Marte is a 55 year old male who presents via phone visit today for the following health issues:    HPI       Traveled to [place] earlier this month for birthday.  Did saliva test which was positive on 11/11 due to a slight cough.  Have another positive nasal test 11/13 and wife tested positive at this time as well. He denies fever, temperature or cough. He denies body aches or upset stomach.      Review of Systems   Constitutional, HEENT, cardiovascular, pulmonary, gi and gu systems are negative, except as otherwise noted.       Objective          Vitals:  No vitals were obtained today due to virtual visit.    healthy, alert and no distress  PSYCH: Alert and oriented times 3; coherent speech, normal   rate and volume, " "able to articulate logical thoughts, able   to abstract reason, no tangential thoughts, no hallucinations   or delusions  His affect is normal  RESP: No cough, no audible wheezing, able to talk in full sentences  Remainder of exam unable to be completed due to telephone visits    Hospital Outpatient Visit on 09/29/2020   Component Date Value Ref Range Status     Glucose 09/29/2020 165* 70 - 99 mg/dL Final     Copath Report 09/29/2020    Final                    Value:Patient Name: NARENDRA BLACKMON  MR#: [id number]  Specimen #: [id number]  Collected: 9/29/2020  Received: 9/30/2020  Reported: 10/4/2020 10:18  Ordering Phy(s): GIOVANA HERNANDEZ    For improved result formatting, select 'View Enhanced Report Format' under   Linked Documents section.    SPECIMEN(S):  Sigmoid colon polyp    FINAL DIAGNOSIS:  SIGMOID, POLYP X1, POLYPECTOMY:  - Goblet cell rich hyperplastic polyp    I have personally reviewed all specimens and/or slides, including the   listed special stains, and used them  with my medical judgement to determine or confirm the final diagnosis.    Electronically signed out by:    Jayna Judge M.D., [facility]    CLINICAL HISTORY:  One 4 mm polyp in the sigmoid colon    GROSS:  A: The specimen is received in formalin with proper patient   identification, labeled \"sigmoid polyp x1\".  The  specimen consists of four segments of tan soft tissue ranging from 0.3-0.8   cm in greatest dimension.  It is  entirely submitted in cassette A1. (Dictated b                          y: Ayse FLANNERY [facility]   9/30/2020 08:55 AM)    MICROSCOPIC:  Microscopic examination was performed.  Additional levels were examined.    The technical component of this testing was completed at the General acute hospital, with the professional component performed   at the [facility], [address] [place] [id number] " ([phone])    CPT Codes:  A: 50261-CI5    COLLECTION SITE:  Client: [facility] [place]  Location: [facility] (B)    Resident  AMELIA       COLONOSCOPY 09/29/2020    Final                    Value:[facility]  Endoscopy Department-[place]  _______________________________________________________________________________  Patient Name: Geovany Marte             Procedure Date: 9/29/2020 9:16 AM  MRN: [id number]                       YOB: 1965  Admit Type: Outpatient                Age: 54  Gender: Male                          Note Status: Finalized  Attending MD: Sierra Slaughter MD  Instrument Name: [id number]  _______________________________________________________________________________     Procedure:                Colonoscopy  Indications:              Screening for colorectal malignant neoplasm  Providers:                Sierra Slaughter MD, Grace Green RN  Referring MD:             Mona Scales MD  Medicines:                See the Anesthesia note for documentation of the                             administered medications  Complications:            No immediate complications.  ___________________                          ____________________________________________________________  Procedure:                Pre-Anesthesia Assessment:                            - Prior to the procedure, a History and Physical                             was performed, and patient medications and                             allergies were reviewed. The patient is competent.                             The risks and benefits of the procedure and the                             sedation options and risks were discussed with the                             patient. All questions were answered and informed                             consent was obtained. Patient identification and                             proposed procedure were  verified by the physician,                             the nurse and the anesthetist in the pre-procedure                             area in the endoscopy suite. Mental Status                             Examination: normal. Airway Examination: normal                             oropharyngeal ai                          rway and neck mobility. Respiratory                             Examination: clear to auscultation. CV Examination:                             normal. Prophylactic Antibiotics: The patient does                             not require prophylactic antibiotics. Prior                             Anticoagulants: The patient has taken no previous                             anticoagulant or antiplatelet agents except for                             aspirin. ASA Grade Assessment: II - A patient with                             mild systemic disease. After reviewing the risks                             and benefits, the patient was deemed in                             satisfactory condition to undergo the procedure.                             The anesthesia plan was to use monitored anesthesia                             care (MAC). Immediately prior to administration of                             medications, the patient was re-assessed for                             adequacy to receiv                          e sedatives. The heart rate,                             respiratory rate, oxygen saturations, blood                             pressure, adequacy of pulmonary ventilation, and                             response to care were monitored throughout the                             procedure. The physical status of the patient was                             re-assessed after the procedure.                            After obtaining informed consent, the colonoscope                             was passed under direct vision. Throughout the                             procedure, the patient's  blood pressure, pulse, and                             oxygen saturations were monitored continuously. The                             was introduced through the anus and advanced to the                             cecum, identified by appendiceal orifice and                             ileocecal valve. The colonoscopy was performed with                             ease. The patient tolerated the pro                          cedure well. The                             quality of the bowel preparation was evaluated                             using the BBPS ([place] Bowel Preparation Scale)                             with scores of: Right Colon = 2 (minor amount of                             residual staining, small fragments of stool and/or                             opaque liquid, but mucosa seen well), Transverse                             Colon = 2 (minor amount of residual staining, small                             fragments of stool and/or opaque liquid, but mucosa                             seen well) and Left Colon = 2 (minor amount of                             residual staining, small fragments of stool and/or                             opaque liquid, but mucosa seen well). The total                             BBPS score equals 6. The quality of the bowel                             preparation was good.                                                                                   Fin                          dings:       The perianal and digital rectal examinations were normal.       A single small-mouthed diverticulum was found in the transverse colon.       A 4 mm polyp was found in the sigmoid colon. The polyp was sessile. The        polyp was removed with a cold snare and biopsy forceps. Resection and        retrieval were complete.       Internal hemorrhoids were found during retroflexion. The hemorrhoids        were small.                                                                                    Moderate Sedation:       Moderate Sedation was not administered  Impression:               - Diverticulosis in the transverse colon.                            - One 4 mm polyp in the sigmoid colon, removed with                             a cold snare. Resected and retrieved.  Recommendation:           - Await pathology results.                            - Repeat colonoscopy for surveillance based on                             pathology results.                            - Patient                           has a contact number available for                             emergencies. The signs and symptoms of potential                             delayed complications were discussed with the                             patient. Return to normal activities tomorrow.                             Written discharge instructions were provided to the                             patient.                                                                                     Electronically Signed by Dr. Slaughter  ______________________  Sierra Slaughter MD  9/29/2020 10:27:37 AM  I was physically present for the entire viewing portion of the exam.Sierra Slaughter MD  Number of Addenda: 0    Note Initiated On: 9/29/2020 9:16 AM  Scope In:  Scope Out:               Assessment/Plan:    Assessment & Plan     Infection due to 2019 novel coronavirus  Resolved - discussed what to expect, future testing and antibodies    Type 2 diabetes, HbA1c goal < 7% (H)  Patient will closely monitor for worsening    Morbid obesity (H)  Low carb diet          The uses and side effects, including black box warnings as appropriate, were discussed in detail.  All patient questions were answered.  The patient was instructed to call immediately if any side effects developed.     Return in about 2 weeks (around 12/7/2020) for Lab Work.    Mona Scales MD  [facility]    Phone call duration:  11  minutes

## 2020-11-23 NOTE — PATIENT INSTRUCTIONS
Patient Education     Coronavirus Disease 2019 (COVID-19): Caring for Yourself or Others   If you or a household member have symptoms of COVID-19, follow the guidelines below. This will help you manage symptoms and keep the virus from spreading.  If you have symptoms of COVID-19    Stay home and contact your care team. They will tell you what to do.    Don t panic. Keep in mind that other illnesses can cause similar symptoms.    Stay away from work, school, and public places.    Limit physical contact with others in your home. Limit visitors. No kissing.  Clean surfaces you touch with disinfectant.  If you need to cough or sneeze, do it into a tissue. Then throw the tissue into the trash. If you don't have tissues, cough or sneeze into the bend of your elbow.  Don t share food or personal items with people in your household. This includes items like eating and drinking utensils, towels, and bedding.  Wear a cloth face mask around other people. During a public health emergency, medical face masks may be reserved for healthcare workers. You may need to make a cloth face mask of your own. You can do this using a bandana, T-shirt, or other cloth. The CDC has instructions on how to make a face mask. Wear the mask so that it covers both your nose and mouth.  If you need to go to a hospital or clinic, call ahead to let them know. Expect the care team to wear masks, gowns, gloves, and eye protection. You may be put in a separate room.  Follow all instructions from your care team.    If you ve been diagnosed with COVID-19    Stay home and away from others, including other people in your home. (This is called self-isolation.) Don t leave home unless you need to get medical care. Don t go to work, school, or public places. Don t use buses, taxis, or other public transportation.    Follow all instructions from your care team.    If you need to go to a hospital or clinic, call ahead to let them know. Expect the care team to  wear masks, gowns, gloves, and eye protection. You may be put in a separate room.    Wear a face mask over your nose and mouth. This is to protect others from your germs. If you can t wear a mask, your caregivers should wear one. You may need to make your own mask using a bandana, T-shirt, or other cloth. See the [facility] s instructions on how to make a face mask.    Have no contact with pets and other animals.    Don t share food or personal items with people in your household. This includes items like eating and drinking utensils, towels, and bedding.    If you need to cough or sneeze, do it into a tissue. Then throw the tissue into the trash. If you don't have tissues, cough or sneeze into the bend of your elbow.    Wash your hands often.    Self-care at home   At this time, there is no medicine approved to prevent or treat COVID-19. Experts are testing different medicines, trying to find one that works.  So far, the most proven treatment is to support your body while it fights the virus.    Getting extra rest.    Drink extra fluids 6 to 8 glasses of liquids each day), unless a doctor has told you not to. Ask your care team which fluids are best for you. Avoid fluids that contain caffeine or alcohol.    Taking over-the-counter (OTC) medicine to reduce pain and fever. Your care team will tell you which medicine to use.  If you ve been in the hospital for COVID-19, follow your care team s instructions. They will tell you when to stop self-isolation. They may also have you change positions to help your breathing (such as lying on your belly).  If a test showed that you have COVID-19, you may be asked to donate plasma after you ve recovered. (This is called COVID-19 convalescent plasma donation.) The plasma may contain antibodies to help fight the virus in others. Scientists are testing whether this might be a treatment in the future. For more information, talk to your care team.  Caring for a sick person     Follow all  instructions from the care team.    Wash your hands often.    Wear protective clothing as advised.    Make sure the sick person wears a mask. If they can't wear a mask, don t stay in the same room with them. If you must be in the same room, wear a face mask. Make sure the mask covers both the nose and mouth.    Keep track of the sick person s symptoms.    Clean surfaces often with disinfectant. This includes phones, kitchen counters, fridge door handles, bathroom surfaces, and others.    Don t let anyone share household items with the sick person. This includes eating and drinking tools, towels, sheets, or blankets.    Clean fabrics and laundry well.    Keep other people and pets away from the sick person.    When you can stop self-isolation  When you are sick with COVID-19, you should stay away from other people. This is called self-isolation. The rules for ending self-isolation depend on your health in general.  If you are normally healthy  You can stop self-isolation when all 3 of these are true:    You ve had no fever--and no medicine that reduces fever--for at least 24 hours. And     Your symptoms are better, such as cough or trouble breathing. And     At least 10 days have passed since your symptoms first started.  Talk with your care team before you leave home. They may tell you it s okay to leave, or they may give you different advice.  If you have a weak immune system  If you re being treated for cancer, have an immune disorder (such as HIV), or have had a transplant &$40;organ or bone marrow), follow your care team s instructions. You may be asked to stay home from 10 days to 20 days after your symptoms first started. Your care team may want to re-test you for COVID-19.  When you return to public settings  When you are well enough to go outside your home, follow the CDC s guidance on cloth face masks.    Anyone over age 2 should wear a face mask in public, especially when it's hard to socially distance.  This includes public transit, public protests and marches, and crowded stores, bars, and restaurants.    Face masks are most likely to reduce the spread of COVID-19 when they are widely used by people who are out in the public.  Certain people should not wear a face covering. These include:    Children under 2 years old    Anyone with a health, developmental, or mental health condition that can be made worse by wearing a mask    Anyone who is unconscious or unable to remove the face covering without help. See the CDC's guidance on who should not wear a face mask.  When to call your care team  Call your care team right away if a sick person has any of these:    Trouble breathing    Pain or pressure in chest  If a sick person has any of these, call 911:    Trouble breathing that gets worse    Pain or pressure in chest that gets worse    Blue tint to lips or face    Fast or irregular heartbeat    Confusion or trouble waking    Fainting or loss of consciousness    Coughing up blood  Going home from the hospital   If you have COVID-19 and were recently in the hospital:    Follow the instructions above for self-care and isolation.    Follow the hospital care team s instructions.    Ask questions if anything is unclear to you. Write down answers so you remember them.  Date last modified: 10/13/2020  Deanna last reviewed this educational content on 4/1/2020  This information has been modified by your health care provider with permission from the publisher.    7252-1118 [employer]. [address] [place] [address], [place] [address]. All rights reserved. This information is not intended as a substitute for professional medical care. Always follow your healthcare professional's instructions.

## 2020-12-23 ENCOUNTER — ANCILLARY PROCEDURE (OUTPATIENT)
Dept: GENERAL RADIOLOGY | Facility: CLINIC | Age: 55
End: 2020-12-23
Attending: NURSE PRACTITIONER
Payer: COMMERCIAL

## 2020-12-23 ENCOUNTER — OFFICE VISIT (OUTPATIENT)
Dept: URGENT CARE | Facility: URGENT CARE | Age: 55
End: 2020-12-23
Payer: COMMERCIAL

## 2020-12-23 VITALS
RESPIRATION RATE: 16 BRPM | HEART RATE: 97 BPM | BODY MASS INDEX: 45.57 KG/M2 | TEMPERATURE: 98.8 F | OXYGEN SATURATION: 97 % | SYSTOLIC BLOOD PRESSURE: 162 MMHG | WEIGHT: 315 LBS | DIASTOLIC BLOOD PRESSURE: 89 MMHG

## 2020-12-23 DIAGNOSIS — S32.019A CLOSED FRACTURE OF FIRST LUMBAR VERTEBRA, UNSPECIFIED FRACTURE MORPHOLOGY, INITIAL ENCOUNTER (H): ICD-10-CM

## 2020-12-23 DIAGNOSIS — M54.9 MID BACK PAIN ON RIGHT SIDE: Primary | ICD-10-CM

## 2020-12-23 DIAGNOSIS — I10 ELEVATED BLOOD PRESSURE READING IN OFFICE WITH DIAGNOSIS OF HYPERTENSION: ICD-10-CM

## 2020-12-23 PROCEDURE — 96372 THER/PROPH/DIAG INJ SC/IM: CPT | Performed by: NURSE PRACTITIONER

## 2020-12-23 PROCEDURE — 99214 OFFICE O/P EST MOD 30 MIN: CPT | Mod: 25 | Performed by: NURSE PRACTITIONER

## 2020-12-23 PROCEDURE — 72100 X-RAY EXAM L-S SPINE 2/3 VWS: CPT | Performed by: RADIOLOGY

## 2020-12-23 RX ORDER — KETOROLAC TROMETHAMINE 10 MG/1
10 TABLET, FILM COATED ORAL EVERY 6 HOURS PRN
Qty: 28 TABLET | Refills: 0 | Status: SHIPPED | OUTPATIENT
Start: 2020-12-23 | End: 2020-12-30

## 2020-12-23 RX ORDER — CYCLOBENZAPRINE HCL 10 MG
5-10 TABLET ORAL 3 TIMES DAILY PRN
Qty: 30 TABLET | Refills: 0 | Status: SHIPPED | OUTPATIENT
Start: 2020-12-23 | End: 2020-12-31

## 2020-12-23 RX ORDER — KETOROLAC TROMETHAMINE 30 MG/ML
60 INJECTION, SOLUTION INTRAMUSCULAR; INTRAVENOUS ONCE
Status: COMPLETED | OUTPATIENT
Start: 2020-12-23 | End: 2020-12-23

## 2020-12-23 RX ADMIN — KETOROLAC TROMETHAMINE 60 MG: 30 INJECTION, SOLUTION INTRAMUSCULAR; INTRAVENOUS at 11:49

## 2020-12-23 ASSESSMENT — ENCOUNTER SYMPTOMS
RHINORRHEA: 0
SHORTNESS OF BREATH: 0
VOMITING: 0
NAUSEA: 0
DIAPHORESIS: 0
DIARRHEA: 0
CHILLS: 0
SORE THROAT: 0
BACK PAIN: 1
FEVER: 0
COUGH: 0

## 2020-12-23 NOTE — PROGRESS NOTES
SUBJECTIVE:   Geovany Marte is a 55 year old male presenting with a chief complaint of   Chief Complaint   Patient presents with     Back Pain     Fall in November, stabbing pain mid-low back and flank pain.       He is an established patient of [facility].    Back Pain    Onset of symptoms was 3 week(s) ago.  Location: left middle of back  Radiation: does not radiate  Context:       The injury happened while at home      Mechanism: fell from a ladder      Patient experienced immediate pain, delayed pain  Course of symptoms is worsening.    Severity moderate  Current and Associated symptoms: pain and stiffness  Denies: fecal incontinence, urinary incontinence, lower extremity numbness, lower extremity weakness and paresthesia    Aggravating Factors: bending and changing position  Therapies to improve symptoms include: ibuprofen helps for a short time  Past history: no prior back problems    Blood pressure is elevated tod ay to 190/97, reports that he hasn't taken his medications for HTN this morning.    Review of Systems   Constitutional: Negative for chills, diaphoresis and fever.   HENT: Negative for congestion, ear pain, rhinorrhea and sore throat.    Respiratory: Negative for cough and shortness of breath.    Cardiovascular:        Elevated blood pressure   Gastrointestinal: Negative for diarrhea, nausea and vomiting.   Musculoskeletal: Positive for back pain.   All other systems reviewed and are negative.      Past Medical History:   Diagnosis Date     Anemia      Asthma      DM (diabetes mellitus) (H)      GERD (gastroesophageal reflux disease)      HTN      Hyperlipidemia      Morbid obesity (H)      Nonsenile cataract      Nuclear sclerosis 5/17/2013     Family History   Problem Relation Age of Onset     Cerebrovascular Disease Mother      Hypertension Mother      Diabetes Father      Hypertension Father      Heart Disease Brother      Breast Cancer Sister      Cancer Sister      Cerebrovascular Disease Brother       Breast Cancer Sister      Asthma Son      Thyroid Disease No family hx of      Glaucoma No family hx of      Macular Degeneration No family hx of      Current Outpatient Medications   Medication Sig Dispense Refill     ACCU-CHEK GUIDE test strip USE TO TEST BLOOD SUGAR 1 TIME DAILY OR AS DIRECTED 100 strip 2     ACE/ARB NOT PRESCRIBED, INTENTIONAL, continuous prn for other Reported on 4/3/2017       albuterol (PROAIR HFA) 108 (90 Base) MCG/ACT inhaler Inhale 2 puffs into the lungs every 4 hours as needed (for asthma symptoms) 8.5 g 1     amLODIPine (NORVASC) 10 MG tablet TAKE 1 TABLET BY MOUTH EVERY DAY 90 tablet 1     ASPIRIN 81 MG PO TABS 1 TABLET DAILY (*)       azelastine (OPTIVAR) 0.05 % ophthalmic solution Apply 1 drop to eye 2 times daily 1 Bottle 11     bisacodyl (DULCOLAX) 5 MG EC tablet Take 3 tablets (15 mg) by mouth See Admin Instructions --Take at 5 PM day prior to procedure 3 tablet 0     blood glucose (NO BRAND SPECIFIED) lancets standard Use to test blood sugar 1 times daily or as directed. 100 each 3     blood glucose (NO BRAND SPECIFIED) test strip Use to test blood sugar 1 times daily or as directed. 100 each 3     Blood Glucose Monitoring Suppl (ACCU-CHEK GUIDE ME) w/Device KIT USE TO TEST BLOOD SUGAR 1 TIMES DAILY OR AS DIRECTED 1 kit 0     Blood Pressure Monitoring (BLOOD PRESSURE CUFF) MISC Use to check blood pressure 3 - 4 times per week. 1 each 0     Calcium-Magnesium-Vitamin D (CITRACAL CALCIUM+D PO) 3 times daily. Take one tablet twice daily.       carvedilol (COREG) 25 MG tablet TAKE 1 TABLET BY MOUTH TWICE A DAY WITH MEALS 180 tablet 2     cyclobenzaprine (FLEXERIL) 10 MG tablet Take 0.5-1 tablets (5-10 mg) by mouth 3 times daily as needed (back pain) 30 tablet 0     EPINEPHrine (AUVI-Q) 0.3 MG/0.3ML injection 2-pack Inject 0.3 mLs (0.3 mg) into the muscle as needed for anaphylaxis 4 mL 1     fluticasone (FLONASE) 50 MCG/ACT nasal spray SPRAY 1-2 SPRAYS INTO BOTH NOSTRILS DAILY 48 mL  2     glucose blood VI test strips (ONE TOUCH ULTRA TEST) strip by In Vitro route 2 times daily. 1 Box 12     hydrOXYzine (ATARAX) 50 MG tablet Take 1 tablet (50 mg) by mouth every 8 hours as needed for anxiety 60 tablet 3     ketorolac (TORADOL) 10 MG tablet Take 1 tablet (10 mg) by mouth every 6 hours as needed for moderate pain 28 tablet 0     Na Sulfate-K Sulfate-Mg Sulf (SUPREP BOWEL PREP KIT) solution Take 177 mLs (1 Bottle) by mouth See Admin Instructions -Split dose 2 day Regimen: The evening before your procedure: dilute one bottle with water to a total volume of 16oz (up to the fill line).  At 6pm,  drink the entire amount.  Drink 32 oz of water over the next hour. The morning of your procedure repeat both steps above using the second bottle.  Start 5 hours before your procedure and complete the prep at least 3 hours before you arrive. 2 Bottle 0     omeprazole (PRILOSEC) 20 MG DR capsule TAKE 1 CAPSULE BY MOUTH EVERY DAY 90 capsule 1     ORDER FOR [facility] Injection Supplies for Vitamin B12: 3cc syringes w/ 27 gauge needles, 1/2 inch length 12 each 0     ORDER FOR DME One touch glucometer with supplies to replace old meter for testing twice daily 1 kit prn     polyethylene glycol (GOLYTELY) 236 g suspension Take 4,000 mLs (4 L) by mouth See Admin Instructions --Drink half gallon (64oz) at 6pm on evening prior to procedure and second half on the morning of procedure (4 hours prior to procedure time) 4 L 0     pravastatin (PRAVACHOL) 80 MG tablet Take 1 tablet (80 mg) by mouth every evening 90 tablet 3     Pseudoephedrine-APAP-DM (DAYQUIL OR)        Simethicone 125 MG TABS Take 125 mg by mouth See Admin Instructions --Take tablet after finishing second half of Golytely with half a glass of water. 1 tablet 0     Simethicone 125 MG TABS Take 125 mg by mouth See Admin Instructions --Take tablet after finishing second half of Suprep with half a glass of water. 1 tablet 0     Social History     Tobacco Use      Smoking status: Never Smoker     Smokeless tobacco: Never Used     Tobacco comment: 1994   Substance Use Topics     Alcohol use: Yes     Alcohol/week: 0.0 standard drinks     Comment: Once a week.       OBJECTIVE  BP (!) 162/89   Pulse 97   Temp 98.8  F (37.1  C) (Tympanic)   Resp 16   Wt (!) 157.7 kg (347 lb 9.6 oz)   SpO2 97%   BMI 45.57 kg/m      Physical Exam  Vitals signs reviewed.   HENT:      Head: Normocephalic.      Right Ear: External ear normal.      Left Ear: External ear normal.   Eyes:      Conjunctiva/sclera: Conjunctivae normal.   Cardiovascular:      Rate and Rhythm: Normal rate.   Pulmonary:      Effort: Pulmonary effort is normal.      Breath sounds: Normal breath sounds.   Abdominal:      General: Bowel sounds are normal.   Musculoskeletal:      Comments:  Lumbosacral spine area reveals focal tenderness  On the right mid back. No external deformity noted. Straight leg raise is equivocal at minimal degrees flexion on both sides.   NEURO: DTR's, motor strength and sensation normal.     Skin:     General: Skin is warm and dry.   Neurological:      General: No focal deficit present.      Mental Status: He is alert.   Psychiatric:         Mood and Affect: Mood normal.         X-Ray was done, my findings are: normal xray of back    ASSESSMENT:      ICD-10-CM    1. Mid back pain on right side  M54.9 XR Lumbar Spine 2/3 Views     ketorolac (TORADOL) injection 60 mg     cyclobenzaprine (FLEXERIL) 10 MG tablet     ketorolac (TORADOL) 10 MG tablet   2. Elevated blood pressure reading in office with diagnosis of hypertension  I10    3. Closed fracture of first lumbar vertebra, unspecified fracture morphology, initial encounter (H)  S32.019[facility]  Referral      Results for orders placed or performed in visit on 12/23/20   XR Lumbar Spine 2/3 Views     Status: None    Narrative    LUMBAR SPINE TWO TO THREE VIEWS   12/23/2020 11:21 AM     HISTORY: Mid back pain on right  side.    COMPARISON: None.      Impression    IMPRESSION: L1 compression fracture is present with mild height loss,  probably new compared to 4/23/2019. Possible slight vertebral body  height loss at T11 and T12 (additional fractures cannot be excluded).  Mild lower lumbar spine degenerative disc disease and facet  arthropathy are present. Paraspinal soft tissues are unremarkable.  Sacrum is partially obscured by bowel gas.    NONI OTOOLE MD         Differential Diagnosis:  Back Pain: myofascial low back strain, lumbosacral strain, degenerative disc disease, possible herniated disc , acute sciatica and compression fracture    PLAN:  Referral to orthopedics is made  Rest the affected painful area as much as possible.  Apply ice for 15-20 minutes intermittently as needed and especially after any offending activity. Pain medication and muscle relaxants.   Blood pressure monitoring at home encouraged. Remember to take your blood pressure medication as instructed.       Patient Instructions     Patient Education     Back Care Tips     Caring for your back  These are things you can do to prevent a recurrence of acute back pain and to reduce symptoms from chronic back pain:    Stay at a healthy weight. If you are overweight, losing weight will help most types of back pain.    Exercise is an important part of recovery from most types of back pain. The muscles behind and in front of the spine support the back. This means strengthening both the back muscles and the abdominal muscles will provide better support for your spine.     Swimming and brisk walking are good overall exercises to improve your fitness level.    Practice safe lifting methods (see below).    Practice good posture when sitting, standing, and walking. Don't sit for a long time. This puts more stress on the lower back than standing or walking.    Wear quality shoes with good arch support. Foot and ankle alignment can affect back symptoms. Don't wear high  heels.    Therapeutic massage can help relax the back muscles without stretching them.    During the first 24 to 72 hours after an acute injury or flare-up of chronic back pain, put an ice pack on the painful area for 20 minutes and then remove it for 20 minutes. Do thisover a period of 60 to 90 minutes, or several times a day. As a safety precaution, don't use a heating pad at bedtime. Sleeping on a heating pad can lead to skin burns or tissue damage.    You can alternate using ice and heat.  Medicines  Talk with your healthcare provider before using medicines, especially if you have other health problems or are taking other medicines.    You may use over-the-counter medicines, such as acetaminophen, ibuprofen, or naprosyn to control pain, unless your healthcare provider prescribed other pain medicine. Talk with your healthcare provider before taking any medicines if you have a chronic condition such as diabetes, liver or kidney disease, stomach ulcers, or digestive bleeding, or are taking blood thinners.    Be careful if you are given prescription pain medicines, opioids, or medicine for muscle spasm. They can cause drowsiness, and affect your coordination, reflexes, and judgment. Don't drive or operate heavy machinery while taking these types of medicines. Take prescription pain medicine only as prescribed by your healthcare provider.  Lumbar stretch  This simple stretch will help relax muscle spasm and keep your back more limber. If exercise makes your back pain worse, don t do it.    Lie on your back with your knees bent and both feet on the ground.    Slowly raise your left knee to your chest as you flatten your lower back against the floor. Hold for 5 seconds.    Relax and repeat the exercise with your right knee.    Do 10 of these exercises for each leg.  Safe lifting method    Don t bend over at the waist to lift an object off the floor.  Instead, bend your knees and hips in a squat.     Keep your back and  head upright    Hold the object close to your body, directly in front of you.    Straighten your legs to lift the object.     Lower the object to the floor in the reverse fashion.    If you must slide something across the floor, push it.    Posture tips  Sitting  Sit in chairs with straight backs or low-back support. Keep your knees lower than your hips, with your feet flat on the floor.  When driving, sit up straight. Adjust the seat forward so you are not leaning toward the steering wheel.  A small pillow or rolled towel behind your lower back may help if you are driving long distances.   Standing  When standing for long periods, shift most of your weight to one leg at a time. Switch legs every few minutes.   Sleeping  The best way to sleep is on your side with your knees bent. Put a low pillow under your head to support your neck in a neutral spine position. Don't use thick pillows that bend your neck to one side. Put a pillow between your legs to further relax your lower back. If you sleep on your back, put pillows under your knees to support your legs in a slightly flexed position. Use a firm mattress. If your mattress sags, replace it, or use a 1/2-inch plywood board under the mattress to add support.  Follow-up care  Follow up with your healthcare provider, or as advised.  If X-rays, a CT scan or an MRI scan were taken, they may be reviewed by a radiologist. You will be told of any new findings that may affect your care.  Call 911  Call 911 if any of the following occur:    Trouble breathing    Confusion    Very drowsy    Fainting or loss of consciousness    Rapid or very slow heart rate    Loss of  bowel or bladder control  When to seek medical advice  Call your healthcare provider right away if any of the following occur:    Pain becomes worse or spreads to your arms or legs    Weakness or numbness in one or both arms or legs    Numbness in the groin area  Deanna last reviewed this educational content on  11/1/2019 2000-2020 [employer]. [address] [place] [address], [place] [address]. All rights reserved. This information is not intended as a substitute for professional medical care. Always follow your healthcare professional's instructions.

## 2020-12-23 NOTE — PATIENT INSTRUCTIONS
Patient Education     Back Care Tips     Caring for your back  These are things you can do to prevent a recurrence of acute back pain and to reduce symptoms from chronic back pain:    Stay at a healthy weight. If you are overweight, losing weight will help most types of back pain.    Exercise is an important part of recovery from most types of back pain. The muscles behind and in front of the spine support the back. This means strengthening both the back muscles and the abdominal muscles will provide better support for your spine.     Swimming and brisk walking are good overall exercises to improve your fitness level.    Practice safe lifting methods (see below).    Practice good posture when sitting, standing, and walking. Don't sit for a long time. This puts more stress on the lower back than standing or walking.    Wear quality shoes with good arch support. Foot and ankle alignment can affect back symptoms. Don't wear high heels.    Therapeutic massage can help relax the back muscles without stretching them.    During the first 24 to 72 hours after an acute injury or flare-up of chronic back pain, put an ice pack on the painful area for 20 minutes and then remove it for 20 minutes. Do thisover a period of 60 to 90 minutes, or several times a day. As a safety precaution, don't use a heating pad at bedtime. Sleeping on a heating pad can lead to skin burns or tissue damage.    You can alternate using ice and heat.  Medicines  Talk with your healthcare provider before using medicines, especially if you have other health problems or are taking other medicines.    You may use over-the-counter medicines, such as acetaminophen, ibuprofen, or naprosyn to control pain, unless your healthcare provider prescribed other pain medicine. Talk with your healthcare provider before taking any medicines if you have a chronic condition such as diabetes, liver or kidney disease, stomach ulcers, or digestive bleeding, or are taking  blood thinners.    Be careful if you are given prescription pain medicines, opioids, or medicine for muscle spasm. They can cause drowsiness, and affect your coordination, reflexes, and judgment. Don't drive or operate heavy machinery while taking these types of medicines. Take prescription pain medicine only as prescribed by your healthcare provider.  Lumbar stretch  This simple stretch will help relax muscle spasm and keep your back more limber. If exercise makes your back pain worse, don t do it.    Lie on your back with your knees bent and both feet on the ground.    Slowly raise your left knee to your chest as you flatten your lower back against the floor. Hold for 5 seconds.    Relax and repeat the exercise with your right knee.    Do 10 of these exercises for each leg.  Safe lifting method    Don t bend over at the waist to lift an object off the floor.  Instead, bend your knees and hips in a squat.     Keep your back and head upright    Hold the object close to your body, directly in front of you.    Straighten your legs to lift the object.     Lower the object to the floor in the reverse fashion.    If you must slide something across the floor, push it.    Posture tips  Sitting  Sit in chairs with straight backs or low-back support. Keep your knees lower than your hips, with your feet flat on the floor.  When driving, sit up straight. Adjust the seat forward so you are not leaning toward the steering wheel.  A small pillow or rolled towel behind your lower back may help if you are driving long distances.   Standing  When standing for long periods, shift most of your weight to one leg at a time. Switch legs every few minutes.   Sleeping  The best way to sleep is on your side with your knees bent. Put a low pillow under your head to support your neck in a neutral spine position. Don't use thick pillows that bend your neck to one side. Put a pillow between your legs to further relax your lower back. If you  sleep on your back, put pillows under your knees to support your legs in a slightly flexed position. Use a firm mattress. If your mattress sags, replace it, or use a 1/2-inch plywood board under the mattress to add support.  Follow-up care  Follow up with your healthcare provider, or as advised.  If X-rays, a CT scan or an MRI scan were taken, they may be reviewed by a radiologist. You will be told of any new findings that may affect your care.  Call 911  Call 911 if any of the following occur:    Trouble breathing    Confusion    Very drowsy    Fainting or loss of consciousness    Rapid or very slow heart rate    Loss of  bowel or bladder control  When to seek medical advice  Call your healthcare provider right away if any of the following occur:    Pain becomes worse or spreads to your arms or legs    Weakness or numbness in one or both arms or legs    Numbness in the groin area  Deanna last reviewed this educational content on 11/1/2019 2000-2020 [employer]. [address] [place] [address], [place] [address]. All rights reserved. This information is not intended as a substitute for professional medical care. Always follow your healthcare professional's instructions.

## 2020-12-29 ENCOUNTER — MYC MEDICAL ADVICE (OUTPATIENT)
Dept: FAMILY MEDICINE | Facility: CLINIC | Age: 55
End: 2020-12-29

## 2020-12-31 ENCOUNTER — OFFICE VISIT (OUTPATIENT)
Dept: ORTHOPEDICS | Facility: CLINIC | Age: 55
End: 2020-12-31
Payer: COMMERCIAL

## 2020-12-31 VITALS
HEIGHT: 73 IN | WEIGHT: 315 LBS | DIASTOLIC BLOOD PRESSURE: 92 MMHG | SYSTOLIC BLOOD PRESSURE: 163 MMHG | BODY MASS INDEX: 41.75 KG/M2

## 2020-12-31 DIAGNOSIS — S32.010A COMPRESSION FRACTURE OF L1 VERTEBRA, INITIAL ENCOUNTER (H): Primary | ICD-10-CM

## 2020-12-31 PROCEDURE — 99244 OFF/OP CNSLTJ NEW/EST MOD 40: CPT | Performed by: PHYSICAL MEDICINE & REHABILITATION

## 2020-12-31 RX ORDER — CYCLOBENZAPRINE HCL 10 MG
5-10 TABLET ORAL 3 TIMES DAILY PRN
Qty: 30 TABLET | Refills: 1 | Status: SHIPPED | OUTPATIENT
Start: 2020-12-31 | End: 2021-01-11 | Stop reason: ALTCHOICE

## 2020-12-31 ASSESSMENT — MIFFLIN-ST. JEOR: SCORE: 2452.86

## 2020-12-31 ASSESSMENT — PAIN SCALES - GENERAL: PAINLEVEL: MODERATE PAIN (4)

## 2020-12-31 NOTE — PATIENT INSTRUCTIONS
-Physical therapy ordered at the [facility].  Please call [phone] to schedule.  Please do 5-6 days of exercises per week between formal sessions and the home exercises they provide.  -Flexeril refilled. Can cause sedation.  Do not take prior to driving.  -Ice or heat 15-20 minutes as needed (Avoid sleeping on a heating pad or ice).  -Patient's preferred over the counter medication as directed on packaging as needed for pain or soreness.  -Over the counter lidocaine cream or Voltaren gel as needed.     -Bracing as desired for comfort and support.      -Also discussed referral to spine team to discuss vertebroplasty    -Geovany to follow up with Primary Care provider regarding elevated blood pressure.    -Follow up as needed if symptoms fail to improve or worsen.  Please call with questions or concerns.

## 2020-12-31 NOTE — PROGRESS NOTES
Sports Medicine Clinic Visit    PCP: Mona Lin    CC: Patient presents with:  Spine - Lumbar/SI, Pain  Lower Back - Lumbar/SI, Pain      HPI:  Geovany Marte is a 55 year old male who is seen in consultation at the request of Daly Castillo CNP.   He notes right low back pain due to an injury on 11/28/20 when he fell from a ladder at home. He notes pain over the right lower back. He rates the pain at a  10/10 at its worst and a 4/10 currently with meds.  Symptoms are relieved with ice, heat, other medications: Toradol and Cyclobenzaprine and rest, back brace. Symptoms are worsened by getting out of bed, transitioning positions, prolonged sitting. He endorses sharp pain.   He denies swelling, bruising, popping, grinding, catching, locking, instability, numbness, tingling, weakness, pain in other joints and fever, chills.  Other treatment has included nothing. He notes difficulty with getting out of bed, transitioning positions, prolonged sitting. Almost out of medications. Feels overall he is improving.    He is currently not working    Review of Systems:  Musculoskeletal: as above  Remainder of review of systems is negative including constitutional, eyes, ENT, CV, pulmonary, GI, , endocrine, skin, hematologic, and neurologic except as noted in HPI or medical history.    History reviewed. No pertinent past surgical/medical/family/social history other than as mentioned in HPI.    Patient Active Problem List   Diagnosis     Hyperlipidemia LDL goal <100     Type 2 diabetes, HbA1c goal < 7% (H)     Disturbance of skin sensation     Intermittent asthma     Hypertension goal BP (blood pressure) < 140/90     Dry eye syndrome     Nuclear sclerosis     Erectile dysfunction     Glaucoma suspect     Gout     Seasonal allergic rhinitis     Morbid obesity (H)     Retrograde ejaculation     Bariatric surgery status     Plantar warts     Allergic rhinitis due to animals     Allergic rhinitis due to mold     Past  Medical History:   Diagnosis Date     Anemia      Asthma      DM (diabetes mellitus) (H)      GERD (gastroesophageal reflux disease)      HTN      Hyperlipidemia      Morbid obesity (H)      Nonsenile cataract      Nuclear sclerosis 5/17/2013     Past Surgical History:   Procedure Laterality Date     ENT SURGERY      tonsils removed at 19 years old     ESOPHAGOSCOPY, GASTROSCOPY, DUODENOSCOPY (EGD), COMBINED N/A 3/18/2015    Procedure: COMBINED ESOPHAGOSCOPY, GASTROSCOPY, DUODENOSCOPY (EGD);  Surgeon: Jae Robles MD;  Location:  GI     LAPAROSCOPIC BYPASS GASTRIC  6/22/2011    Procedure:LAPAROSCOPIC BYPASS GASTRIC; Surgeon:HANNAH SHEPARD; Location: OR     Family History   Problem Relation Age of Onset     Cerebrovascular Disease Mother      Hypertension Mother      Diabetes Father      Hypertension Father      Heart Disease Brother      Breast Cancer Sister      Cancer Sister      Cerebrovascular Disease Brother      Breast Cancer Sister      Asthma Son      Thyroid Disease No family hx of      Glaucoma No family hx of      Macular Degeneration No family hx of      Social History     Socioeconomic History     Marital status:      Spouse name: Not on file     Number of children: 7     Years of education: 14     Highest education level: Not on file   Occupational History     Occupation:      Employer: [employer]   Social Needs     Financial resource strain: Not on file     Food insecurity     Worry: Not on file     Inability: Not on file     Transportation needs     Medical: Not on file     Non-medical: Not on file   Tobacco Use     Smoking status: Never Smoker     Smokeless tobacco: Never Used     Tobacco comment: 1994   Substance and Sexual Activity     Alcohol use: Yes     Alcohol/week: 0.0 standard drinks     Comment: Once a week.     Drug use: No     Sexual activity: Yes     Partners: Female   Lifestyle     Physical activity     Days per week: Not on file     Minutes per session:  Not on file     Stress: Not on file   Relationships     Social connections     Talks on phone: Not on file     Gets together: Not on file     Attends [employer] service: Not on file     Active member of club or organization: Not on file     Attends meetings of clubs or organizations: Not on file     Relationship status: Not on file     Intimate partner violence     Fear of current or ex partner: Not on file     Emotionally abused: Not on file     Physically abused: Not on file     Forced sexual activity: Not on file   Other Topics Concern     Parent/sibling w/ CABG, MI or angioplasty before 65F 55M? No      Service No     Blood Transfusions No     Caffeine Concern No     Occupational Exposure No     Hobby Hazards No     Sleep Concern No     Stress Concern No     Weight Concern No     Special Diet No     Back Care No     Exercise Yes     Bike Helmet No     Seat Belt Yes     Self-Exams No   Social History Narrative     Not on file           Current Outpatient Medications   Medication     ACCU-CHEK GUIDE test strip     ACE/ARB NOT PRESCRIBED, INTENTIONAL,     albuterol (PROAIR HFA) 108 (90 Base) MCG/ACT inhaler     amLODIPine (NORVASC) 10 MG tablet     ASPIRIN 81 MG PO TABS     azelastine (OPTIVAR) 0.05 % ophthalmic solution     bisacodyl (DULCOLAX) 5 MG EC tablet     blood glucose (NO BRAND SPECIFIED) lancets standard     blood glucose (NO BRAND SPECIFIED) test strip     Blood Glucose Monitoring Suppl (ACCU-CHEK GUIDE ME) w/Device KIT     Blood Pressure Monitoring (BLOOD PRESSURE CUFF) MISC     Calcium-Magnesium-Vitamin D (CITRACAL CALCIUM+D PO)     carvedilol (COREG) 25 MG tablet     cyclobenzaprine (FLEXERIL) 10 MG tablet     EPINEPHrine (AUVI-Q) 0.3 MG/0.3ML injection 2-pack     fluticasone (FLONASE) 50 MCG/ACT nasal spray     glucose blood VI test strips (ONE TOUCH ULTRA TEST) strip     hydrOXYzine (ATARAX) 50 MG tablet     Na Sulfate-K Sulfate-Mg Sulf (SUPREP BOWEL PREP KIT) solution     omeprazole  "(PRILOSEC) 20 MG DR capsule     ORDER FOR DME     ORDER FOR DME     polyethylene glycol (GOLYTELY) 236 g suspension     pravastatin (PRAVACHOL) 80 MG tablet     Pseudoephedrine-APAP-DM (DAYQUIL OR)     Simethicone 125 MG TABS     Simethicone 125 MG TABS     No current facility-administered medications for this visit.      Allergies   Allergen Reactions     Lisinopril Anaphylaxis     Shellfish Allergy Difficulty breathing     Throat closing up     Shellfish-Derived Products      Throat swells up, hard time breathing         Objective:  BP (!) 163/92 (BP Location: Right arm, Patient Position: Sitting, Cuff Size: Adult Large)   Ht 1.854 m (6' 1\")   Wt (!) 157.4 kg (347 lb)   BMI 45.78 kg/m      General: Alert and in no distress    Head: Normocephalic, atraumatic  Eyes: no scleral icterus or conjunctival erythema   Skin: no erythema, petechiae, or jaundice  CV: regular rhythm by palpation, 2+ distal pulses  Resp: normal respiratory effort without conversational dyspnea   Psych: normal mood and affect    Gait: Non-antalgic, appropriate coordination and balance   Neuro: Motor strength and sensation as noted below    Musculoskeletal:  -No tenderness to palpation over the lumbar spine  -Lumbar ROM is full.  Lumbar extension is mildly painful.  -Sensation intact to light touch over the lower extremities.    Radiology:  Independent visualization of images performed and reviewed with Geovany.    LUMBAR SPINE TWO TO THREE VIEWS   12/23/2020 11:21 AM      HISTORY: Mid back pain on right side.     COMPARISON: None.                                                           IMPRESSION: L1 compression fracture is present with mild height loss,  probably new compared to 4/23/2019. Possible slight vertebral body  height loss at T11 and T12 (additional fractures cannot be excluded).  Mild lower lumbar spine degenerative disc disease and facet  arthropathy are present. Paraspinal soft tissues are unremarkable.  Sacrum is partially " obscured by bowel gas.     NONI OTOOLE MD    Assessment:  1. Compression fracture of L1 vertebra, initial encounter (H)    2. Mid back pain on right side        Plan:  Discussed the assessment with the patient and developed a plan together:  -Physical therapy ordered at the [facility].  Please call [phone] to schedule.  Please do 5-6 days of exercises per week between formal sessions and the home exercises they provide.  -Flexeril refilled. Can cause sedation.  Do not take prior to driving.  -Ice or heat 15-20 minutes as needed (Avoid sleeping on a heating pad or ice).  -After finishing ketorolac, he can transition to NSAIDs.  Can also use Tylenol as needed.    -Over the counter lidocaine cream or Voltaren gel as needed.     -Lumbar support bracing as desired for comfort and support.      -Also discussed referral to spine team to discuss vertebroplasty    -Geovany to follow up with primary care provider regarding elevated blood pressure.    -Follow up as needed if symptoms fail to improve or worsen.  Please call with questions or concerns.        Edwige Berg MD, [facility]  [facility]

## 2020-12-31 NOTE — LETTER
12/31/2020         RE: Geovany Marte  [address]  [place] [address]        Dear Colleague,    Thank you for referring your patient, Geovany Marte, to the [facility]. Please see a copy of my visit note below.    Sports Medicine Clinic Visit    PCP: Mona Lin    CC: Patient presents with:  Spine - Lumbar/SI, Pain  Lower Back - Lumbar/SI, Pain      HPI:  Geovany Marte is a 55 year old male who is seen in consultation at the request of Daly Castillo CNP.   He notes right low back pain due to an injury on 11/28/20 when he fell from a ladder at home. He notes pain over the right lower back. He rates the pain at a  10/10 at its worst and a 4/10 currently with meds.  Symptoms are relieved with ice, heat, other medications: Toradol and Cyclobenzaprine and rest, back brace. Symptoms are worsened by getting out of bed, transitioning positions, prolonged sitting. He endorses sharp pain.   He denies swelling, bruising, popping, grinding, catching, locking, instability, numbness, tingling, weakness, pain in other joints and fever, chills.  Other treatment has included nothing. He notes difficulty with getting out of bed, transitioning positions, prolonged sitting. Almost out of medications. Feels overall he is improving.    He is currently not working    Review of Systems:  Musculoskeletal: as above  Remainder of review of systems is negative including constitutional, eyes, ENT, CV, pulmonary, GI, , endocrine, skin, hematologic, and neurologic except as noted in HPI or medical history.    History reviewed. No pertinent past surgical/medical/family/social history other than as mentioned in HPI.    Patient Active Problem List   Diagnosis     Hyperlipidemia LDL goal <100     Type 2 diabetes, HbA1c goal < 7% (H)     Disturbance of skin sensation     Intermittent asthma     Hypertension goal BP (blood pressure) < 140/90     Dry eye syndrome     Nuclear sclerosis      Erectile dysfunction     Glaucoma suspect     Gout     Seasonal allergic rhinitis     Morbid obesity (H)     Retrograde ejaculation     Bariatric surgery status     Plantar warts     Allergic rhinitis due to animals     Allergic rhinitis due to mold     Past Medical History:   Diagnosis Date     Anemia      Asthma      DM (diabetes mellitus) (H)      GERD (gastroesophageal reflux disease)      HTN      Hyperlipidemia      Morbid obesity (H)      Nonsenile cataract      Nuclear sclerosis 5/17/2013     Past Surgical History:   Procedure Laterality Date     ENT SURGERY      tonsils removed at 19 years old     ESOPHAGOSCOPY, GASTROSCOPY, DUODENOSCOPY (EGD), COMBINED N/A 3/18/2015    Procedure: COMBINED ESOPHAGOSCOPY, GASTROSCOPY, DUODENOSCOPY (EGD);  Surgeon: Jae Robles MD;  [facility]     LAPAROSCOPIC BYPASS GASTRIC  6/22/2011    Procedure:LAPAROSCOPIC BYPASS GASTRIC; Surgeon:HANNAH SHEPARD; Location: OR     Family History   Problem Relation Age of Onset     Cerebrovascular Disease Mother      Hypertension Mother      Diabetes Father      Hypertension Father      Heart Disease Brother      Breast Cancer Sister      Cancer Sister      Cerebrovascular Disease Brother      Breast Cancer Sister      Asthma Son      Thyroid Disease No family hx of      Glaucoma No family hx of      Macular Degeneration No family hx of      Social History     Socioeconomic History     Marital status:      Spouse name: Not on file     Number of children: 7     Years of education: 14     Highest education level: Not on file   Occupational History     Occupation:      Employer: [employer]   Social Needs     Financial resource strain: Not on file     Food insecurity     Worry: Not on file     Inability: Not on file     Transportation needs     Medical: Not on file     Non-medical: Not on file   Tobacco Use     Smoking status: Never Smoker     Smokeless tobacco: Never Used     Tobacco comment: 1994    Substance and Sexual Activity     Alcohol use: Yes     Alcohol/week: 0.0 standard drinks     Comment: Once a week.     Drug use: No     Sexual activity: Yes     Partners: Female   Lifestyle     Physical activity     Days per week: Not on file     Minutes per session: Not on file     Stress: Not on file   Relationships     Social connections     Talks on phone: Not on file     Gets together: Not on file     Attends [employer] service: Not on file     Active member of club or organization: Not on file     Attends meetings of clubs or organizations: Not on file     Relationship status: Not on file     Intimate partner violence     Fear of current or ex partner: Not on file     Emotionally abused: Not on file     Physically abused: Not on file     Forced sexual activity: Not on file   Other Topics Concern     Parent/sibling w/ CABG, MI or angioplasty before 65F 55M? No      Service No     Blood Transfusions No     Caffeine Concern No     Occupational Exposure No     Hobby Hazards No     Sleep Concern No     Stress Concern No     Weight Concern No     Special Diet No     Back Care No     Exercise Yes     Bike Helmet No     Seat Belt Yes     Self-Exams No   Social History Narrative     Not on file           Current Outpatient Medications   Medication     ACCU-CHEK GUIDE test strip     ACE/ARB NOT PRESCRIBED, INTENTIONAL,     albuterol (PROAIR HFA) 108 (90 Base) MCG/ACT inhaler     amLODIPine (NORVASC) 10 MG tablet     ASPIRIN 81 MG PO TABS     azelastine (OPTIVAR) 0.05 % ophthalmic solution     bisacodyl (DULCOLAX) 5 MG EC tablet     blood glucose (NO BRAND SPECIFIED) lancets standard     blood glucose (NO BRAND SPECIFIED) test strip     Blood Glucose Monitoring Suppl (ACCU-CHEK GUIDE ME) w/Device KIT     Blood Pressure Monitoring (BLOOD PRESSURE CUFF) MISC     Calcium-Magnesium-Vitamin D (CITRACAL CALCIUM+D PO)     carvedilol (COREG) 25 MG tablet     cyclobenzaprine (FLEXERIL) 10 MG tablet     EPINEPHrine  "(AUVI-Q) 0.3 MG/0.3ML injection 2-pack     fluticasone (FLONASE) 50 MCG/ACT nasal spray     glucose blood VI test strips (ONE TOUCH ULTRA TEST) strip     hydrOXYzine (ATARAX) 50 MG tablet     Na Sulfate-K Sulfate-Mg Sulf (SUPREP BOWEL PREP KIT) solution     omeprazole (PRILOSEC) 20 MG DR capsule     ORDER FOR DME     ORDER FOR DME     polyethylene glycol (GOLYTELY) 236 g suspension     pravastatin (PRAVACHOL) 80 MG tablet     Pseudoephedrine-APAP-DM (DAYQUIL OR)     Simethicone 125 MG TABS     Simethicone 125 MG TABS     No current facility-administered medications for this visit.      Allergies   Allergen Reactions     Lisinopril Anaphylaxis     Shellfish Allergy Difficulty breathing     Throat closing up     Shellfish-Derived Products      Throat swells up, hard time breathing         Objective:  BP (!) 163/92 (BP Location: Right arm, Patient Position: Sitting, Cuff Size: Adult Large)   Ht 1.854 m (6' 1\")   Wt (!) 157.4 kg (347 lb)   BMI 45.78 kg/m      General: Alert and in no distress    Head: Normocephalic, atraumatic  Eyes: no scleral icterus or conjunctival erythema   Skin: no erythema, petechiae, or jaundice  CV: regular rhythm by palpation, 2+ distal pulses  Resp: normal respiratory effort without conversational dyspnea   Psych: normal mood and affect    Gait: Non-antalgic, appropriate coordination and balance   Neuro: Motor strength and sensation as noted below    Musculoskeletal:  -No tenderness to palpation over the lumbar spine  -Lumbar ROM is full.  Lumbar extension is mildly painful.  -Sensation intact to light touch over the lower extremities.    Radiology:  Independent visualization of images performed and reviewed with Geovany.    LUMBAR SPINE TWO TO THREE VIEWS   12/23/2020 11:21 AM      HISTORY: Mid back pain on right side.     COMPARISON: None.                                                           IMPRESSION: L1 compression fracture is present with mild height loss,  probably new compared " to 4/23/2019. Possible slight vertebral body  height loss at T11 and T12 (additional fractures cannot be excluded).  Mild lower lumbar spine degenerative disc disease and facet  arthropathy are present. Paraspinal soft tissues are unremarkable.  Sacrum is partially obscured by bowel gas.     NONI OTOOLE MD    Assessment:  1. Compression fracture of L1 vertebra, initial encounter (H)    2. Mid back pain on right side        Plan:  Discussed the assessment with the patient and developed a plan together:  -Physical therapy ordered at the [facility].  Please call [phone] to schedule.  Please do 5-6 days of exercises per week between formal sessions and the home exercises they provide.  -Flexeril refilled. Can cause sedation.  Do not take prior to driving.  -Ice or heat 15-20 minutes as needed (Avoid sleeping on a heating pad or ice).  -After finishing ketorolac, he can transition to NSAIDs.  Can also use Tylenol as needed.    -Over the counter lidocaine cream or Voltaren gel as needed.     -Lumbar support bracing as desired for comfort and support.      -Also discussed referral to spine team to discuss vertebroplasty    -Geovany to follow up with primary care provider regarding elevated blood pressure.    -Follow up as needed if symptoms fail to improve or worsen.  Please call with questions or concerns.        Edwige Berg MD, [facility]  [facility]        Again, thank you for allowing me to participate in the care of your patient.        Sincerely,        Bárbara Berg MD

## 2021-01-01 ENCOUNTER — OFFICE VISIT (OUTPATIENT)
Dept: URGENT CARE | Facility: URGENT CARE | Age: 56
End: 2021-01-01
Payer: COMMERCIAL

## 2021-01-01 VITALS
BODY MASS INDEX: 45.78 KG/M2 | OXYGEN SATURATION: 95 % | SYSTOLIC BLOOD PRESSURE: 130 MMHG | DIASTOLIC BLOOD PRESSURE: 80 MMHG | HEART RATE: 97 BPM | TEMPERATURE: 101 F | WEIGHT: 315 LBS

## 2021-01-01 DIAGNOSIS — Z86.16 HISTORY OF COVID-19: ICD-10-CM

## 2021-01-01 DIAGNOSIS — E11.9 TYPE 2 DIABETES MELLITUS WITHOUT COMPLICATION, UNSPECIFIED WHETHER LONG TERM INSULIN USE (H): ICD-10-CM

## 2021-01-01 DIAGNOSIS — S32.010A COMPRESSION FRACTURE OF L1 VERTEBRA, INITIAL ENCOUNTER (H): ICD-10-CM

## 2021-01-01 DIAGNOSIS — R50.9 FEVER AND CHILLS: Primary | ICD-10-CM

## 2021-01-01 PROCEDURE — 99214 OFFICE O/P EST MOD 30 MIN: CPT | Performed by: PHYSICIAN ASSISTANT

## 2021-01-01 ASSESSMENT — PAIN SCALES - GENERAL: PAINLEVEL: NO PAIN (0)

## 2021-01-01 NOTE — PROGRESS NOTES
S: 55-year-old diabetic male presents for fevers and chills that started last night.  Temp up to 101.  No sore throat, cough, congestion, nausea, vomiting, nasal discharge, dysuria, frequency or urgency.  He did test positive for Covid November 8 or 9.  On November 28 he fell and sustained a compression fracture of L1.  This is in the process of healing.  He does not feel that his back pain is worse than usual.  No muscle aches.  He did have a influenza vaccination.        Allergies   Allergen Reactions     Lisinopril Anaphylaxis     Shellfish Allergy Difficulty breathing     Throat closing up     Shellfish-Derived Products      Throat swells up, hard time breathing       Past Medical History:   Diagnosis Date     Anemia      Asthma      DM (diabetes mellitus) (H)      GERD (gastroesophageal reflux disease)      HTN      Hyperlipidemia      Morbid obesity (H)      Nonsenile cataract      Nuclear sclerosis 5/17/2013            ACCU-CHEK GUIDE test strip, USE TO TEST BLOOD SUGAR 1 TIME DAILY OR AS DIRECTED       ACE/ARB NOT PRESCRIBED, INTENTIONAL,, continuous prn for other Reported on 4/3/2017       albuterol (PROAIR HFA) 108 (90 Base) MCG/ACT inhaler, Inhale 2 puffs into the lungs every 4 hours as needed (for asthma symptoms)       amLODIPine (NORVASC) 10 MG tablet, TAKE 1 TABLET BY MOUTH EVERY DAY       ASPIRIN 81 MG PO TABS, 1 TABLET DAILY (*)       azelastine (OPTIVAR) 0.05 % ophthalmic solution, Apply 1 drop to eye 2 times daily       bisacodyl (DULCOLAX) 5 MG EC tablet, Take 3 tablets (15 mg) by mouth See Admin Instructions --Take at 5 PM day prior to procedure       blood glucose (NO BRAND SPECIFIED) lancets standard, Use to test blood sugar 1 times daily or as directed.       blood glucose (NO BRAND SPECIFIED) test strip, Use to test blood sugar 1 times daily or as directed.       Blood Glucose Monitoring Suppl (ACCU-CHEK GUIDE ME) w/Device KIT, USE TO TEST BLOOD SUGAR 1 TIMES DAILY OR AS DIRECTED       Blood  Pressure Monitoring (BLOOD PRESSURE CUFF) MISC, Use to check blood pressure 3 - 4 times per week.       Calcium-Magnesium-Vitamin D (CITRACAL CALCIUM+D PO), 3 times daily. Take one tablet twice daily.       carvedilol (COREG) 25 MG tablet, TAKE 1 TABLET BY MOUTH TWICE A DAY WITH MEALS       cyclobenzaprine (FLEXERIL) 10 MG tablet, Take 0.5-1 tablets (5-10 mg) by mouth 3 times daily as needed (back pain)       EPINEPHrine (AUVI-Q) 0.3 MG/0.3ML injection 2-pack, Inject 0.3 mLs (0.3 mg) into the muscle as needed for anaphylaxis       fluticasone (FLONASE) 50 MCG/ACT nasal spray, SPRAY 1-2 SPRAYS INTO BOTH NOSTRILS DAILY       glucose blood VI test strips (ONE TOUCH ULTRA TEST) strip, by In Vitro route 2 times daily.       hydrOXYzine (ATARAX) 50 MG tablet, Take 1 tablet (50 mg) by mouth every 8 hours as needed for anxiety       KETOROLAC TROMETHAMINE PO,        Na Sulfate-K Sulfate-Mg Sulf (SUPREP BOWEL PREP KIT) solution, Take 177 mLs (1 Bottle) by mouth See Admin Instructions -Split dose 2 day Regimen: The evening before your procedure: dilute one bottle with water to a total volume of 16oz (up to the fill line).  At 6pm,  drink the entire amount.  Drink 32 oz of water over the next hour. The morning of your procedure repeat both steps above using the second bottle.  Start 5 hours before your procedure and complete the prep at least 3 hours before you arrive.       omeprazole (PRILOSEC) 20 MG DR capsule, TAKE 1 CAPSULE BY MOUTH EVERY DAY       ORDER FOR DME, Injection Supplies for Vitamin B12: 3cc syringes w/ 27 gauge needles, 1/2 inch length       ORDER FOR DME, One touch glucometer with supplies to replace old meter for testing twice daily       polyethylene glycol (GOLYTELY) 236 g suspension, Take 4,000 mLs (4 L) by mouth See Admin Instructions --Drink half gallon (64oz) at 6pm on evening prior to procedure and second half on the morning of procedure (4 hours prior to procedure time)       pravastatin (PRAVACHOL)  80 MG tablet, Take 1 tablet (80 mg) by mouth every evening       Pseudoephedrine-APAP-DM (DAYQUIL OR),        Simethicone 125 MG TABS, Take 125 mg by mouth See Admin Instructions --Take tablet after finishing second half of Golytely with half a glass of water.       Simethicone 125 MG TABS, Take 125 mg by mouth See Admin Instructions --Take tablet after finishing second half of Suprep with half a glass of water.    No current facility-administered medications on file prior to visit.       Social History     Tobacco Use     Smoking status: Never Smoker     Smokeless tobacco: Never Used     Tobacco comment: 1994   Substance Use Topics     Alcohol use: Yes     Alcohol/week: 0.0 standard drinks     Comment: Once a week.       ROS:  CONSTITUTIONAL: Negative for fatigue.  EYES: Negative for eye problems.  ENT: neg for ST.  RESP: neg for cough.  CV: Negative for chest pains.  GI: Negative for vomiting.  MUSCULOSKELETAL:  Negative for significant muscle or joint pains.  NEUROLOGIC: Negative for headaches.  SKIN: Negative for rash.  PSYCH: Normal mentation for age.    OBJECTIVE:  /80 (BP Location: Right arm, Patient Position: Sitting, Cuff Size: Adult Large)   Pulse 97   Temp 101  F (38.3  C) (Oral)   Wt (!) 157.4 kg (347 lb)   SpO2 95%   BMI 45.78 kg/m    GENERAL APPEARANCE: Healthy, alert and no distress.  EYES:Conjunctiva/sclera clear.  EARS: No cerumen.   Ear canals w/o erythema.  TM's intact w/o erythema.    NOSE/MOUTH: Nose without ulcers, erythema or lesions.  SINUSES: No maxillary sinus tenderness.  THROAT: No erythema w/o tonsillar enlargement . No exudates.  NECK: Supple, nontender, no lymphadenopathy.  RESP: Lungs clear to auscultation - no rales, rhonchi or wheezes  CV: Regular rate and rhythm, normal S1 S2, no murmur noted.  NEURO: Awake, alert    SKIN: No rashes  Abd- soft NT  Back- tender L1      ASSESSMENT:     ICD-10-CM    1. Fever and chills  R50.9    2. Compression fracture of L1 vertebra,  initial encounter (H)  S32.010A    3. History of COVID-19  Z86.16    4. Type 2 diabetes mellitus without complication, unspecified whether long term insulin use (H)  E11.9              PLAN: Fever and chills in diabetic male with known compression fracture of L1.  No other signs or symptoms to suggest respiratory illness. Positive covid test 11/8 or 11/9. Recommended with comorbidities that he go to the ER for further evaluation as we are limited on labs and imaging.  I wonder if there could be possibility of discitis with the compression fracture.  I have discussed clinical findings with patient.  All questions are answered, patient indicates understanding of these issues and is in agreement with plan.   Patient care instructions are discussed/given at the end of visit.   Thalia Summers PA-C

## 2021-01-01 NOTE — PATIENT INSTRUCTIONS
Fever and chills x 2 days. Compression fracture 11/28 of L1. Had covid 11/8. Has had influenza vaccination. No respiratory symptoms. No urinary symptoms. Diabetic. To ER for evaluation. Limited on labs and xrays.

## 2021-01-11 ENCOUNTER — OFFICE VISIT (OUTPATIENT)
Dept: FAMILY MEDICINE | Facility: CLINIC | Age: 56
End: 2021-01-11
Payer: COMMERCIAL

## 2021-01-11 VITALS
HEIGHT: 73 IN | SYSTOLIC BLOOD PRESSURE: 134 MMHG | HEART RATE: 83 BPM | OXYGEN SATURATION: 98 % | DIASTOLIC BLOOD PRESSURE: 82 MMHG | TEMPERATURE: 97.3 F | BODY MASS INDEX: 41.75 KG/M2 | WEIGHT: 315 LBS

## 2021-01-11 DIAGNOSIS — Z00.00 ROUTINE GENERAL MEDICAL EXAMINATION AT A HEALTH CARE FACILITY: Primary | ICD-10-CM

## 2021-01-11 DIAGNOSIS — E66.01 MORBID OBESITY (H): ICD-10-CM

## 2021-01-11 DIAGNOSIS — Z12.5 SCREENING PSA (PROSTATE SPECIFIC ANTIGEN): ICD-10-CM

## 2021-01-11 DIAGNOSIS — S32.010A COMPRESSION FRACTURE OF L1 VERTEBRA, INITIAL ENCOUNTER (H): ICD-10-CM

## 2021-01-11 DIAGNOSIS — R97.20 ELEVATED PROSTATE SPECIFIC ANTIGEN (PSA): ICD-10-CM

## 2021-01-11 DIAGNOSIS — I10 HYPERTENSION GOAL BP (BLOOD PRESSURE) < 140/90: ICD-10-CM

## 2021-01-11 DIAGNOSIS — E11.9 TYPE 2 DIABETES, HBA1C GOAL < 7% (H): ICD-10-CM

## 2021-01-11 LAB
ANION GAP SERPL CALCULATED.3IONS-SCNC: 7 MMOL/L (ref 3–14)
BUN SERPL-MCNC: 9 MG/DL (ref 7–30)
CALCIUM SERPL-MCNC: 9.1 MG/DL (ref 8.5–10.1)
CHLORIDE SERPL-SCNC: 102 MMOL/L (ref 94–109)
CHOLEST SERPL-MCNC: 109 MG/DL
CO2 SERPL-SCNC: 27 MMOL/L (ref 20–32)
CREAT SERPL-MCNC: 0.78 MG/DL (ref 0.66–1.25)
CREAT UR-MCNC: 264 MG/DL
GFR SERPL CREATININE-BSD FRML MDRD: >90 ML/MIN/{1.73_M2}
GLUCOSE SERPL-MCNC: 186 MG/DL (ref 70–99)
HBA1C MFR BLD: 8.1 % (ref 0–5.6)
HDLC SERPL-MCNC: 25 MG/DL
LDLC SERPL CALC-MCNC: 63 MG/DL
MICROALBUMIN UR-MCNC: 26 MG/L
MICROALBUMIN/CREAT UR: 10.04 MG/G CR (ref 0–17)
NONHDLC SERPL-MCNC: 84 MG/DL
POTASSIUM SERPL-SCNC: 4 MMOL/L (ref 3.4–5.3)
PSA SERPL-ACNC: 5.14 UG/L (ref 0–4)
SODIUM SERPL-SCNC: 136 MMOL/L (ref 133–144)
TRIGL SERPL-MCNC: 105 MG/DL

## 2021-01-11 PROCEDURE — 82043 UR ALBUMIN QUANTITATIVE: CPT | Performed by: FAMILY MEDICINE

## 2021-01-11 PROCEDURE — 99396 PREV VISIT EST AGE 40-64: CPT | Performed by: FAMILY MEDICINE

## 2021-01-11 PROCEDURE — 80048 BASIC METABOLIC PNL TOTAL CA: CPT | Performed by: FAMILY MEDICINE

## 2021-01-11 PROCEDURE — 83036 HEMOGLOBIN GLYCOSYLATED A1C: CPT | Performed by: FAMILY MEDICINE

## 2021-01-11 PROCEDURE — G0103 PSA SCREENING: HCPCS | Performed by: FAMILY MEDICINE

## 2021-01-11 PROCEDURE — 36415 COLL VENOUS BLD VENIPUNCTURE: CPT | Performed by: FAMILY MEDICINE

## 2021-01-11 PROCEDURE — 80061 LIPID PANEL: CPT | Performed by: FAMILY MEDICINE

## 2021-01-11 PROCEDURE — 99213 OFFICE O/P EST LOW 20 MIN: CPT | Mod: 25 | Performed by: FAMILY MEDICINE

## 2021-01-11 ASSESSMENT — PAIN SCALES - GENERAL: PAINLEVEL: NO PAIN (0)

## 2021-01-11 ASSESSMENT — MIFFLIN-ST. JEOR: SCORE: 2421.11

## 2021-01-11 NOTE — PROGRESS NOTES
3  SUBJECTIVE:   CC: Geovany Marte is an 55 year old male who presents for preventive health visit.       Patient has been advised of split billing requirements and indicates understanding:   Healthy Habits:    Do you get at least three servings of calcium containing foods daily (dairy, green leafy vegetables, etc.)? yes    Amount of exercise or daily activities, outside of work: none    Problems taking medications regularly No    Medication side effects: Yes dry mouth talk about changing med     Have you had an eye exam in the past two years? yes    Do you see a dentist twice per year? yes    Do you have sleep apnea, excessive snoring or daytime drowsiness?no      Diabetes Follow-up    How often are you checking your blood sugar? Two times daily  Blood sugar testing frequency justification:    What time of day are you checking your blood sugars (select all that apply)?  Random times  Have you had any blood sugars above 200?  Yes related to food  Have you had any blood sugars below 70?  No    What symptoms do you notice when your blood sugar is low?  None    What concerns do you have today about your diabetes? None     Do you have any of these symptoms? (Select all that apply)  Excessive thirst      BP Readings from Last 2 Encounters:   01/11/21 134/82   01/01/21 130/80     Hemoglobin A1C (%)   Date Value   01/11/2021 8.1 (H)   09/28/2020 7.1 (H)     LDL Cholesterol Calculated (mg/dL)   Date Value   04/16/2020 119 (H)   03/14/2019 99         Compression fracture of L1 related to fall from ladder at home.  Improved now and see by ortho.  Using cyclobenzaprine and advil but having significant dry mouth.    HTN - taking medications as directed.  Nsaids over the last few weeks due to back.    Today's PHQ-2 Score:   PHQ-2 ( 1999 Pfizer) 1/11/2021 4/16/2020   Q1: Little interest or pleasure in doing things 0 0   Q2: Feeling down, depressed or hopeless 0 0   PHQ-2 Score 0 0       Abuse: Current or Past(Physical, Sexual or  "Emotional)- No  Do you feel safe in your environment? Yes    Have you ever done Advance Care Planning? (For example, a Health Directive, POLST, or a discussion with a medical provider or your loved ones about your wishes): No, advance care planning information given to patient to review.  Patient declined advance care planning discussion at this time.    Social History     Tobacco Use     Smoking status: Never Smoker     Smokeless tobacco: Never Used     Tobacco comment: 1994   Substance Use Topics     Alcohol use: Yes     Alcohol/week: 0.0 standard drinks     Comment: Once a week.     If you drink alcohol do you typically have >3 drinks per day or >7 drinks per week? No                      Last PSA:   PSA   Date Value Ref Range Status   10/30/2019 3.35 0 - 4 ug/L Final     Comment:     Assay Method:  Chemiluminescence using Siemens Vista analyzer       Reviewed orders with patient. Reviewed health maintenance and updated orders accordingly - Yes      Reviewed and updated as needed this visit by clinical staff  Tobacco  Allergies  Meds  Problems  Med Hx  Surg Hx  Fam Hx  Soc Hx          Reviewed and updated as needed this visit by Provider  Tobacco  Allergies  Meds  Problems  Med Hx  Surg Hx  Fam Hx             ROS:  10 point ROS of systems including Constitutional, Eyes, Respiratory, Cardiovascular, Gastroenterology, Genitourinary, Integumentary, Muscularskeletal, Psychiatric were all negative except for pertinent positives noted in my HPI.     OBJECTIVE:   /82 (BP Location: Left arm)   Pulse 83   Temp 97.3  F (36.3  C) (Tympanic)   Ht 1.854 m (6' 1\")   Wt (!) 154.2 kg (340 lb)   SpO2 98%   BMI 44.86 kg/m    EXAM:  GENERAL: healthy, alert, no distress and obese  EYES: Eyes grossly normal to inspection, PERRL and conjunctivae and sclerae normal  HENT: ear canals and TM's normal, nose and mouth without ulcers or lesions  NECK: no adenopathy, no asymmetry, masses, or scars and thyroid normal " to palpation  RESP: lungs clear to auscultation - no rales, rhonchi or wheezes  CV: regular rate and rhythm, normal S1 S2, no S3 or S4, no murmur, click or rub, no peripheral edema and peripheral pulses strong  ABDOMEN: soft, nontender, no hepatosplenomegaly, no masses and bowel sounds normal  MS: no gross musculoskeletal defects noted, no edema  SKIN: no suspicious lesions or rashes  NEURO: Normal strength and tone, mentation intact and speech normal  PSYCH: mentation appears normal, affect normal/bright  Diabetic foot exam: normal DP and PT pulses, no trophic changes or ulcerative lesions, normal sensory exam and normal monofilament exam    Diagnostic Test Results:  Labs reviewed in Epic    ASSESSMENT/PLAN:   1. Routine general medical examination at a health care facility  Routine preventive discussed    2. Morbid obesity (H)  Low carb diet    3. Compression fracture of L1 vertebra, initial encounter (H)  Change cyclobenzaprine to tizanidine. Continue vitamin D supplement  - tiZANidine (ZANAFLEX) 4 MG tablet; Take 1 tablet (4 mg) by mouth 3 times daily as needed for muscle spasms  Dispense: 30 tablet; Refill: 1    4. Type 2 diabetes, HbA1c goal < 7% (H)  Uncertain control - check labs, low carb diet and medication adjustments as needed.  - Albumin Random Urine Quantitative with Creat Ratio  - Lipid panel reflex to direct LDL Fasting  - Hemoglobin A1c    5. Hypertension goal BP (blood pressure) < 140/90  Controlled check labs and start NSAIDS as soon as able  - Basic metabolic panel    6. Screening PSA (prostate specific antigen)  screening  - PSA, screen    The uses and side effects, including black box warnings as appropriate, were discussed in detail.  All patient questions were answered.  The patient was instructed to call immediately if any side effects developed.     Patient has been advised of split billing requirements and indicates understanding: Yes  COUNSELING:  Reviewed preventive health counseling, as  "reflected in patient instructions    Estimated body mass index is 44.86 kg/m  as calculated from the following:    Height as of this encounter: 1.854 m (6' 1\").    Weight as of this encounter: 154.2 kg (340 lb).    Weight management plan: Discussed healthy diet and exercise guidelines    He reports that he has never smoked. He has never used smokeless tobacco.      Counseling Resources:  ATP IV Guidelines  Pooled Cohorts Equation Calculator  FRAX Risk Assessment  ICSI Preventive Guidelines  Dietary Guidelines for Americans, 2010  USDA's MyPlate  ASA Prophylaxis  Lung CA Screening    Mona Scales MD  [facility]  "

## 2021-01-11 NOTE — PATIENT INSTRUCTIONS
At [facility], we strive to deliver an exceptional experience to you, every time we see you. If you receive a survey, please complete it as we do value your feedback.  If you have MyChart, you can expect to receive results automatically within 24 hours of their completion.  Your provider will send a note interpreting your results as well.   If you do not have MyChart, you should receive your results in about a week by mail.    Your care team:                            Family Medicine Internal Medicine   MD Gurpreet Howard, MD Adonis Ramirez MD Katya Georgiev PA-C  Lilian Live, APRN CNP    Lee Yin, MD Pediatrics   Dmitry Chavez, PARicardaC  Lani Portillo, CNP MD Mouna Turk APRN CNP   MD Marge Willson MD Deborah Mielke, MD Candelaria Denton, APRN CNP  Barbara Chand, PARicardaC  Dasha Page, CNP  MD Inocencia Montoya MD Angela Wermerskirchen, MD      Clinic hours: Monday - Thursday 7 am-7 pm; Fridays 7 am-5 pm.   Urgent care: Monday - Friday 11 am-9 pm; Saturday and Sunday 9 am-5 pm.    Clinic: [phone]       [facility]: Monday - Thursday 8 am - 7 pm; Friday 8 am - 6 pm  [facility]: [phone]     Use www.[email or web address] for 24/7 diagnosis and treatment of dozens of conditions.  Preventive Health Recommendations  Male Ages 50 - 64    Yearly exam:             See your health care provider every year in order to  o   Review health changes.   o   Discuss preventive care.    o   Review your medicines if your doctor has prescribed any.     Have a cholesterol test every 5 years, or more frequently if you are at risk for high cholesterol/heart disease.     Have a diabetes test (fasting glucose) every three years. If you are at risk for diabetes, you should have this test more often.     Have a colonoscopy at age 50, or have a yearly FIT  test (stool test). These exams will check for colon cancer.      Talk with your health care provider about whether or not a prostate cancer screening test (PSA) is right for you.    You should be tested each year for STDs (sexually transmitted diseases), if you re at risk.     Shots: Get a flu shot each year. Get a tetanus shot every 10 years.     Nutrition:    Eat at least 5 servings of fruits and vegetables daily.     Eat whole-grain bread, whole-wheat pasta and brown rice instead of white grains and rice.     Get adequate Calcium and Vitamin D.     Lifestyle    Exercise for at least 150 minutes a week (30 minutes a day, 5 days a week). This will help you control your weight and prevent disease.     Limit alcohol to one drink per day.     No smoking.     Wear sunscreen to prevent skin cancer.     See your dentist every six months for an exam and cleaning.     See your eye doctor every 1 to 2 years.

## 2021-01-12 ENCOUNTER — THERAPY VISIT (OUTPATIENT)
Dept: PHYSICAL THERAPY | Facility: CLINIC | Age: 56
End: 2021-01-12
Attending: PHYSICAL MEDICINE & REHABILITATION
Payer: COMMERCIAL

## 2021-01-12 DIAGNOSIS — M54.50 BILATERAL LOW BACK PAIN WITHOUT SCIATICA: ICD-10-CM

## 2021-01-12 DIAGNOSIS — S32.010A COMPRESSION FRACTURE OF L1 VERTEBRA, INITIAL ENCOUNTER (H): ICD-10-CM

## 2021-01-12 PROBLEM — R97.20 ELEVATED PROSTATE SPECIFIC ANTIGEN (PSA): Status: ACTIVE | Noted: 2021-01-12

## 2021-01-12 PROCEDURE — 97161 PT EVAL LOW COMPLEX 20 MIN: CPT | Mod: GP | Performed by: PHYSICAL THERAPIST

## 2021-01-12 PROCEDURE — 97110 THERAPEUTIC EXERCISES: CPT | Mod: GP | Performed by: PHYSICAL THERAPIST

## 2021-01-12 NOTE — RESULT ENCOUNTER NOTE
Mr. Marte,    Your labs are good except your diabetes has worsened.  Try decreasing your carbohydrates and sugar intake.  Your PSA (prostate) test is slightly elevated. This does not mean you have prostate cancer but we should do more evaluation. I would suggest your see urology for further evaluation. I have placed a referral and they should reach out to you in the next 2 - 4 days.    Please contact the clinic if you have additional questions.  Thank you.    Sincerely,    Mona Scales MD

## 2021-01-12 NOTE — PROGRESS NOTES
"[facility] Initial Evaluation  Subjective:  The history is provided by the patient.   Patient Health History  Geovany Marte being seen for lower back pain.     Problem began: 11/28/2020.   Problem occurred: fall   Pain is reported as 3/10 on pain scale.  General health as reported by patient is fair.  Pertinent medical history includes: diabetes, asthma, high blood pressure and overweight.   Red flags:  None as reported by patient.  Medical allergies: other. Other medical allergies details: shell fish.   Surgeries include:  None.    Current medications:  Anti-inflammatory, muscle relaxants and high blood pressure medication.    Current occupation is .   Primary job tasks include:  Computer work and prolonged sitting.                  Therapist Generated HPI Evaluation  Problem details: Patient reports that on 11-28-20, he fell off a ladder in his garage and landed on his back. He sustained a L 1 compression fracture. He has noted that he is much better now than when the fall occurred. Md order from 12-31-20. .         Type of problem:  Lumbar.    This is a new condition.  Condition occurred with:  A fall/slip.  Where condition occurred: at home.  Patient reports pain:  Lumbar spine right, central lumbar spine and lumbar spine left.  Pain is described as aching and is intermittent.  Pain radiates to:  No radiation. Pain is the same all the time.  Since onset symptoms are gradually improving.  Symptoms are exacerbated by sitting and lifting (sit henny 30' with 4/10 pain, not lifting)  and relieved by rest, NSAID's and muscle relaxants.  Special tests included:  X-ray.    Restrictions due to condition include:  Working in normal job without restrictions (working from home due to COVID).  Barriers include:  None as reported by patient.                        Objective:  System         Lumbar/SI Evaluation  ROM:    AROM Lumbar:   Flexion:        Fingertips to ankles and \"tightness\"  Ext:         "            Mod loss and slight ache   Side Bend:        Left:     Right:   Rotation:           Left:     Right:   Side Glide:        Left:     Right:           Lumbar Myotomes:  normal (weak abdominals/trunk stabililzers )            Lumbar DTR's:  normal      Cord Signs:  normal    Lumbar Dermtomes:  normal                Neural Tension/Mobility:  Lumbar:  Normal        Lumbar Palpation:    Tenderness present at Left:    Quadratus Lumborum and Erector Spinae  Tenderness present at Right: Quadratus Lumborum and Erector Spinae                                                       Pankaj Lumbar Evaluation    Posture:  Sitting: poor  Standing: fair  Lordosis: WNL  Lateral Shift: no  Correction of Posture: better                                                   ROS    Assessment/Plan:    Patient is a 55 year old male with lumbar complaints.    Patient has the following significant findings with corresponding treatment plan.                Diagnosis 1:  L 1 compression fracture  Pain -  US, manual therapy, self management, education, directional preference exercise and home program  Decreased ROM/flexibility - manual therapy, therapeutic exercise and home program  Decreased strength - therapeutic exercise, therapeutic activities and home program  Impaired muscle performance - neuro re-education and home program  Decreased function - therapeutic activities and home program  Impaired posture - neuro re-education and home program    Therapy Evaluation Codes:   1) History comprised of:   Personal factors that impact the plan of care:      None.    Comorbidity factors that impact the plan of care are:      None.     Medications impacting care: None.  2) Examination of Body Systems comprised of:   Body structures and functions that impact the plan of care:      Lumbar spine.   Activity limitations that impact the plan of care are:      Lifting, Sitting, Standing and Walking.  3) Clinical presentation characteristics  are:   Stable/Uncomplicated.  4) Decision-Making    Low complexity using standardized patient assessment instrument and/or measureable assessment of functional outcome.  Cumulative Therapy Evaluation is: Low complexity.    Previous and current functional limitations:  (See Goal Flow Sheet for this information)    Short term and Long term goals: (See Goal Flow Sheet for this information)     Communication ability:  Patient appears to be able to clearly communicate and understand verbal and written communication and follow directions correctly.  Treatment Explanation - The following has been discussed with the patient:   RX ordered/plan of care  Anticipated outcomes  Possible risks and side effects  This patient would benefit from PT intervention to resume normal activities.   Rehab potential is good.    Frequency:  1 X week, once daily  Duration:  for 8 weeks  Discharge Plan:  Achieve all LTG.  Independent in home treatment program.  Reach maximal therapeutic benefit.    Please refer to the daily flowsheet for treatment today, total treatment time and time spent performing 1:1 timed codes.

## 2021-01-16 DIAGNOSIS — F41.9 ANXIETY: ICD-10-CM

## 2021-01-18 RX ORDER — HYDROXYZINE HYDROCHLORIDE 50 MG/1
50 TABLET, FILM COATED ORAL EVERY 8 HOURS PRN
Qty: 60 TABLET | Refills: 3 | Status: SHIPPED | OUTPATIENT
Start: 2021-01-18 | End: 2022-04-25

## 2021-01-18 NOTE — TELEPHONE ENCOUNTER
Prescription approved per [facility] Refill Protocol.      Ruby Locke RN, BSN, PHN

## 2021-01-26 ENCOUNTER — THERAPY VISIT (OUTPATIENT)
Dept: PHYSICAL THERAPY | Facility: CLINIC | Age: 56
End: 2021-01-26
Payer: COMMERCIAL

## 2021-01-26 DIAGNOSIS — M54.50 BILATERAL LOW BACK PAIN WITHOUT SCIATICA: ICD-10-CM

## 2021-01-26 PROCEDURE — 97110 THERAPEUTIC EXERCISES: CPT | Mod: GP | Performed by: PHYSICAL THERAPIST

## 2021-01-26 NOTE — LETTER
"[facility]  [address]  [facility] [address]  [phone]    2021    Re: Geovany Marte   :   1965  MRN:  [id number]   REFERRING PHYSICIAN:   Bárbara ORTA [facility]    Date of Initial Evaluation:  2021  Visits:  Rxs Used: 2  Reason for Referral:  Bilateral low back pain without sciatica    Discharge Note  Progress reporting period is from initial evaluation date (please see noted date below) to 2021.  Geovany failed to follow up and current status is unknown. Please see information below for last relevant information on current status.  Patient seen for 2 visits.    SUBJECTIVE  Subjective changes noted by patient: Patient reports that shortly after his first appointment, he re-injured his lower back when blowing snow and sort of lifted the blower which flared his pain. He recovered from that pain in about 3 days or so. He does feel better now than when he started therapy 2 weeks ago.    Current pain level is 1/10.     Previous pain level was (N/A).   Changes in function: Yes (See Goal flowsheet attached for changes in current functional level)  Adverse reaction to treatment or activity: None    OBJECTIVE  Changes noted in objective findings: Standing LROM: FB with fingertips to ankles and no change and BB mod loss and \"feels good\" and CÉSAR causes slight aching.     ASSESSMENT/PLAN  Diagnosis: L1 compression FX   Updated problem list and treatment plan:     Pain - HEP  Decreased ROM/flexibility - HEP  Decreased function - HEP  Decreased strength - HEP  Impaired muscle performance - HEP  Impaired posture - HEP  STG/LTGs have been met or progress has been made towards goals: Yes, please see goal flowsheet for most current information  Assessment of Progress: current status is unknown.    Last current status: Pt is progressing as expected   Self Management Plans: HEP  Re: " Geovany Marte   :   1965    I have re-evaluated this patient and find that the nature, scope, duration and intensity of the therapy is appropriate for the medical condition of the patient.  Geovany continues to require the following intervention to meet STG and LTG's: HEP.    Recommendations:  Discharge with current home program. Patient to follow up with MD as needed.      Thank you for your referral.    INQUIRIES  Therapist: Brie Mak PT   [facility]  [address]  [facility] [address]  Phone: [phone]  Fax: [phone]

## 2021-02-08 ENCOUNTER — OFFICE VISIT (OUTPATIENT)
Dept: UROLOGY | Facility: CLINIC | Age: 56
End: 2021-02-08
Attending: FAMILY MEDICINE
Payer: COMMERCIAL

## 2021-02-08 VITALS — DIASTOLIC BLOOD PRESSURE: 98 MMHG | HEART RATE: 78 BPM | OXYGEN SATURATION: 97 % | SYSTOLIC BLOOD PRESSURE: 180 MMHG

## 2021-02-08 DIAGNOSIS — R97.20 ELEVATED PROSTATE SPECIFIC ANTIGEN (PSA): ICD-10-CM

## 2021-02-08 DIAGNOSIS — I10 HYPERTENSION GOAL BP (BLOOD PRESSURE) < 140/90: Primary | ICD-10-CM

## 2021-02-08 PROCEDURE — 99243 OFF/OP CNSLTJ NEW/EST LOW 30: CPT | Performed by: UROLOGY

## 2021-02-08 RX ORDER — CIPROFLOXACIN 500 MG/1
500 TABLET, FILM COATED ORAL 2 TIMES DAILY
Qty: 6 TABLET | Refills: 0 | Status: SHIPPED | OUTPATIENT
Start: 2021-02-08 | End: 2021-02-11

## 2021-02-08 NOTE — PROGRESS NOTES
S: Geovany Marte is a pleasant  55 year old male who was requested to be seen by  Mona Lin for a consult with regard to patient's elevated PSA.  His recent PSA was found to be   PSA   Date Value Ref Range Status   01/11/2021 5.14 (H) 0 - 4 ug/L Final     Comment:     Assay Method:  Chemiluminescence using Siemens Vista analyzer   .  His previous PSA was normal but high for age in 2019.  Patient complains of no LUTS.  He has no history of elevated PSA.  His AUA Symptom Score:  low.  Current Outpatient Medications   Medication Sig Dispense Refill     ACCU-CHEK GUIDE test strip USE TO TEST BLOOD SUGAR 1 TIME DAILY OR AS DIRECTED 100 strip 2     ACE/ARB NOT PRESCRIBED, INTENTIONAL, continuous prn for other Reported on 4/3/2017       amLODIPine (NORVASC) 10 MG tablet TAKE 1 TABLET BY MOUTH EVERY DAY 90 tablet 1     ASPIRIN 81 MG PO TABS 1 TABLET DAILY (*)       azelastine (OPTIVAR) 0.05 % ophthalmic solution Apply 1 drop to eye 2 times daily 1 Bottle 11     bisacodyl (DULCOLAX) 5 MG EC tablet Take 3 tablets (15 mg) by mouth See Admin Instructions --Take at 5 PM day prior to procedure 3 tablet 0     blood glucose (NO BRAND SPECIFIED) lancets standard Use to test blood sugar 1 times daily or as directed. 100 each 3     blood glucose (NO BRAND SPECIFIED) test strip Use to test blood sugar 1 times daily or as directed. 100 each 3     Blood Glucose Monitoring Suppl (ACCU-CHEK GUIDE ME) w/Device KIT USE TO TEST BLOOD SUGAR 1 TIMES DAILY OR AS DIRECTED 1 kit 0     Blood Pressure Monitoring (BLOOD PRESSURE CUFF) MISC Use to check blood pressure 3 - 4 times per week. 1 each 0     Calcium-Magnesium-Vitamin D (CITRACAL CALCIUM+D PO) 3 times daily. Take one tablet twice daily.       carvedilol (COREG) 25 MG tablet TAKE 1 TABLET BY MOUTH TWICE A DAY WITH MEALS 180 tablet 2     EPINEPHrine (AUVI-Q) 0.3 MG/0.3ML injection 2-pack Inject 0.3 mLs (0.3 mg) into the muscle as needed for anaphylaxis 4 mL 1     fluticasone  (FLONASE) 50 MCG/ACT nasal spray SPRAY 1-2 SPRAYS INTO BOTH NOSTRILS DAILY 48 mL 2     glucose blood VI test strips (ONE TOUCH ULTRA TEST) strip by In Vitro route 2 times daily. 1 Box 12     hydrOXYzine (ATARAX) 50 MG tablet TAKE 1 TABLET (50 MG) BY MOUTH EVERY 8 HOURS AS NEEDED FOR ANXIETY 60 tablet 3     KETOROLAC TROMETHAMINE PO        omeprazole (PRILOSEC) 20 MG DR capsule TAKE 1 CAPSULE BY MOUTH EVERY DAY 90 capsule 1     ORDER FOR DME Injection Supplies for Vitamin B12: 3cc syringes w/ 27 gauge needles, 1/2 inch length 12 each 0     ORDER FOR DME One touch glucometer with supplies to replace old meter for testing twice daily 1 kit prn     polyethylene glycol (GOLYTELY) 236 g suspension Take 4,000 mLs (4 L) by mouth See Admin Instructions --Drink half gallon (64oz) at 6pm on evening prior to procedure and second half on the morning of procedure (4 hours prior to procedure time) 4 L 0     pravastatin (PRAVACHOL) 80 MG tablet Take 1 tablet (80 mg) by mouth every evening 90 tablet 3     Pseudoephedrine-APAP-DM (DAYQUIL OR)        Simethicone 125 MG TABS Take 125 mg by mouth See Admin Instructions --Take tablet after finishing second half of Golytely with half a glass of water. 1 tablet 0     Simethicone 125 MG TABS Take 125 mg by mouth See Admin Instructions --Take tablet after finishing second half of Suprep with half a glass of water. 1 tablet 0     tiZANidine (ZANAFLEX) 4 MG tablet Take 1 tablet (4 mg) by mouth 3 times daily as needed for muscle spasms 30 tablet 1     albuterol (PROAIR HFA) 108 (90 Base) MCG/ACT inhaler Inhale 2 puffs into the lungs every 4 hours as needed (for asthma symptoms) (Patient not taking: Reported on 1/11/2021) 8.5 g 1      Allergies   Allergen Reactions     Lisinopril Anaphylaxis     Shellfish Allergy Difficulty breathing     Throat closing up     Shellfish-Derived Products      Throat swells up, hard time breathing      Past Medical History:   Diagnosis Date     Anemia      Asthma       DM (diabetes mellitus) (H)      GERD (gastroesophageal reflux disease)      HTN      Hyperlipidemia      Morbid obesity (H)      Nonsenile cataract      Nuclear sclerosis 5/17/2013     Past Surgical History:   Procedure Laterality Date     ENT SURGERY      tonsils removed at 19 years old     ESOPHAGOSCOPY, GASTROSCOPY, DUODENOSCOPY (EGD), COMBINED N/A 3/18/2015    Procedure: COMBINED ESOPHAGOSCOPY, GASTROSCOPY, DUODENOSCOPY (EGD);  Surgeon: Jae Robles MD;  Location:  GI     LAPAROSCOPIC BYPASS GASTRIC  6/22/2011    Procedure:LAPAROSCOPIC BYPASS GASTRIC; Surgeon:HANNAH SHEPARD; Location: OR      Family History   Problem Relation Age of Onset     Cerebrovascular Disease Mother      Hypertension Mother      Diabetes Father      Hypertension Father      Heart Disease Brother      Breast Cancer Sister      Cancer Sister      Cerebrovascular Disease Brother      Breast Cancer Sister      Asthma Son      Thyroid Disease No family hx of      Glaucoma No family hx of      Macular Degeneration No family hx of      He does not have a family history of prostate cancer.  Social History     Socioeconomic History     Marital status:      Spouse name: Not on file     Number of children: 7     Years of education: 14     Highest education level: Not on file   Occupational History     Occupation:      Employer: [employer]   Social Needs     Financial resource strain: Not on file     Food insecurity     Worry: Not on file     Inability: Not on file     Transportation needs     Medical: Not on file     Non-medical: Not on file   Tobacco Use     Smoking status: Former Smoker     Types: Cigarettes     Smokeless tobacco: Never Used     Tobacco comment: occasional   Substance and Sexual Activity     Alcohol use: Yes     Alcohol/week: 0.0 standard drinks     Comment: Once a week.     Drug use: No     Sexual activity: Yes     Partners: Female   Lifestyle     Physical activity     Days per week: Not on file      Minutes per session: Not on file     Stress: Not on file   Relationships     Social connections     Talks on phone: Not on file     Gets together: Not on file     Attends Zoroastrianism service: Not on file     Active member of club or organization: Not on file     Attends meetings of clubs or organizations: Not on file     Relationship status: Not on file     Intimate partner violence     Fear of current or ex partner: Not on file     Emotionally abused: Not on file     Physically abused: Not on file     Forced sexual activity: Not on file   Other Topics Concern     Parent/sibling w/ CABG, MI or angioplasty before 65F 55M? No      Service No     Blood Transfusions No     Caffeine Concern No     Occupational Exposure No     Hobby Hazards No     Sleep Concern No     Stress Concern No     Weight Concern No     Special Diet No     Back Care No     Exercise Yes     Bike Helmet No     Seat Belt Yes     Self-Exams No   Social History Narrative     Not on file        REVIEW OF SYSTEMS  =================  C: NEGATIVE for fever, chills, change in weight  I: NEGATIVE for worrisome rashes, moles or lesions  E/M: NEGATIVE for ear, mouth and throat problems  R: NEGATIVE for significant cough or SHORTNESS OF BREATH  CV:  NEGATIVE for chest pain, palpitations or peripheral edema  GI: NEGATIVE for nausea, abdominal pain, heartburn, or change in bowel habits  NEURO: NEGATIVE  PSYCH: NEGATIVE    Physical Exam:  BP (!) 197/105 (BP Location: Other (Comment), Patient Position: Chair, Cuff Size: Adult Regular)   Pulse 78   SpO2 97%    Patient is pleasant, in no acute distress, good general condition.  Lung: no evidence of respiratory distress    Abdomen: Soft, nondistended, non tender. No masses. No rebound or guarding.   Exam: penis no discharge.  Testis no masses.  No scrotal skin lesion.  Prostate 30 gm smooth no nodule.  Skin: Warm and dry.  No redness.  Musculaskeletal: moving all extremities.  No weakness.  Neuro non  focal  Psych normal mood and affect    Assessment/Plan:   (I10) Hypertension goal BP (blood pressure) < 140/90  (primary encounter diagnosis)  Comment:    Plan: Geovany to follow up with Primary Care provider regarding elevated blood pressure.    (R97.20) Elevated prostate specific antigen (PSA)  Comment:    Plan: psa elevation/prostate cancer discussed           Schedule for trus and biopsy            Risks of bleeding/infection discussed           cipro sent

## 2021-02-17 ENCOUNTER — MYC MEDICAL ADVICE (OUTPATIENT)
Dept: FAMILY MEDICINE | Facility: CLINIC | Age: 56
End: 2021-02-17

## 2021-03-01 ENCOUNTER — TRANSFERRED RECORDS (OUTPATIENT)
Dept: HEALTH INFORMATION MANAGEMENT | Facility: CLINIC | Age: 56
End: 2021-03-01
Payer: COMMERCIAL

## 2021-03-01 LAB — RETINOPATHY: NORMAL

## 2021-03-02 ENCOUNTER — OFFICE VISIT (OUTPATIENT)
Dept: UROLOGY | Facility: CLINIC | Age: 56
End: 2021-03-02
Payer: COMMERCIAL

## 2021-03-02 ENCOUNTER — ANCILLARY PROCEDURE (OUTPATIENT)
Dept: ULTRASOUND IMAGING | Facility: CLINIC | Age: 56
End: 2021-03-02
Payer: COMMERCIAL

## 2021-03-02 DIAGNOSIS — R97.20 ELEVATED PROSTATE SPECIFIC ANTIGEN (PSA): Primary | ICD-10-CM

## 2021-03-02 DIAGNOSIS — R97.20 ELEVATED PROSTATE SPECIFIC ANTIGEN (PSA): ICD-10-CM

## 2021-03-02 PROCEDURE — 76942 ECHO GUIDE FOR BIOPSY: CPT | Performed by: UROLOGY

## 2021-03-02 PROCEDURE — 96372 THER/PROPH/DIAG INJ SC/IM: CPT | Mod: 59 | Performed by: UROLOGY

## 2021-03-02 PROCEDURE — 88342 IMHCHEM/IMCYTCHM 1ST ANTB: CPT | Performed by: PATHOLOGY

## 2021-03-02 PROCEDURE — 88305 TISSUE EXAM BY PATHOLOGIST: CPT | Performed by: PATHOLOGY

## 2021-03-02 PROCEDURE — 76872 US TRANSRECTAL: CPT | Performed by: UROLOGY

## 2021-03-02 PROCEDURE — 55700 PR BIOPSY OF PROSTATE,NEEDLE/PUNCH: CPT | Performed by: UROLOGY

## 2021-03-02 RX ORDER — GENTAMICIN 40 MG/ML
80 INJECTION, SOLUTION INTRAMUSCULAR; INTRAVENOUS ONCE
Status: COMPLETED | OUTPATIENT
Start: 2021-03-02 | End: 2021-03-02

## 2021-03-02 RX ADMIN — GENTAMICIN 80 MG: 40 INJECTION, SOLUTION INTRAMUSCULAR; INTRAVENOUS at 10:33

## 2021-03-02 NOTE — PROGRESS NOTES
Patient is here for prostate biopsy.  He was placed in the dorsal lithotomy position.  Prostate ultrasound placed transrectally.  The neurovascular bundle was anesthetized using 1% lidocaine.  Prostate measurements obtained.  Prostate size is 52 cc.  12 biopsies obtained under guidance of prostate ultrasound using biopsy needle.  Patient tolerated procedure.  Return to clinic in one week for biopsy result.

## 2021-03-02 NOTE — PROGRESS NOTES
Injectable Medication Documentation     Patient was given Gentamicin . Prior to medication administration, verified patients identity using patient s name and date of birth. Please see MAR and medication order for additional information. Patient instructed to remain in clinic for 15 minutes and report any adverse reaction to staff immediately .        Was entire vial of medication used? Yes  Vial/Syringe: Single dose vial  Expiration Date:  11/21  Was this medication supplied by the patient? No        The following medication was given:      MEDICATION:Gentamicin   ROUTE: IM  SITE: LUQ - Gluteus  DOSE: 80 mg   LOT #: [id number]  :  [employer]mL[employer] Pro   EXPIRATION DATE:  11/21  NDC#: 83924-993-26  Alicia Mc RN

## 2021-03-05 LAB — COPATH REPORT: NORMAL

## 2021-03-08 NOTE — PROGRESS NOTES
Narendra is a 55 year old who is being evaluated via a billable video visit.      How would you like to obtain your AVS? MyChart  If the video visit is dropped, the invitation should be resent by: [phone]  Will anyone else be joining your video visit? No      Video Start Time: 1015    Assessment & Plan   Problem List Items Addressed This Visit     Morbid obesity (H)      Other Visit Diagnoses     Prostate cancer (H)    -  Primary    Relevant Orders    PSA tumor marker           Review of the result(s) of each unique test - path report  25 minutes spent on the date of the encounter doing chart review, history and exam, documentation and further activities as noted above       Work on weight loss  Regular exercise    No follow-ups on file.    Zac Ivy MD  [facility]    Khris Armenta is a 55 year old who presents for the following health issues path report    HPI     55-year-old male follow-up after recent prostate biopsy due to elevated PSA.  He is without any complaints except for hematospermia.    Review of Systems   Constitutional, HEENT, cardiovascular, pulmonary, gi and gu systems are negative, except as otherwise noted.      Objective           Vitals:  No vitals were obtained today due to virtual visit.    Physical Exam   GENERAL: Healthy, alert and no distress  EYES: Eyes grossly normal to inspection.  No discharge or erythema, or obvious scleral/conjunctival abnormalities.  RESP: No audible wheeze, cough, or visible cyanosis.  No visible retractions or increased work of breathing.    SKIN: Visible skin clear. No significant rash, abnormal pigmentation or lesions.  NEURO: Cranial nerves grossly intact.  Mentation and speech appropriate for age.  PSYCH: Mentation appears normal, affect normal/bright, judgement and insight intact, normal speech and appearance well-groomed.    Copath Report Patient Name: NARENDRA BLACKMON   MR#: [id number]   Specimen #: [id number]   Collected:  3/2/2021   Received: 3/3/2021   Reported: 3/5/2021 12:51   Ordering Phy(s): CHANTELLE AMOS     For improved result formatting, select 'View Enhanced Report Format' under    Linked Documents section.     SPECIMEN(S):   A: Prostate needle biopsy, left   B: Prostate needle biopsy, right     FINAL DIAGNOSIS:   A: Prostate, left, needle core biopsy:   - Adenocarcinoma, acinar type, grade group 1 (Caney score 3 + 3 = 6 of   10).   - Tumor involves 2 of 6 tissue fragment(s) and 3 mm of 75 mm of biopsy   tissue (<5% of biopsy tissue).     B: Prostate, right, needle core biopsy:   - Negative for malignancy.     Electronically signed out by:      Patient is a 55  year old MALE with history of newly diagnosed low risk prostate cancer (group 1 stage T1c). This is a patient with newly diagnosed prostate cancer.  I discussed several management options for prostate cancer with the patient.  The approach is discussed with her active surveillance, radical prostatectomy, radiation therapy in the form of external beam as well as brachytherapy.  We also talked about local therapy options such as cryotherapy and HLFU.  The risks and benefits of each of these approaches were explained in detail.  The risks discussed included risk of incontinence and impotence with surgical therapy.  There is also the immediate perioperative risk of wound infection blood transfusion and rectal injury.  With radiation therapy was discussed where cystitis, proctitis, hematuria, hematochezia.  There is also similar risks of erectile dysfunction with radiation therapy which reduce risk of incontinence.  There is a risk of urinary retention with brachytherapy.  I also explained that cryotherapy and HIFU are still considered less than standard of care as long-term data are lacking.    I recommended observation which patient agrees.  See me in the office in 6 months for reexamination PSA.    Discussed weight loss.              Video-Visit Details    Type of  service:  Video Visit    Video End Time:10:30 AM    Originating Location (pt. Location): Home    Distant Location (provider location):  [facility]     Platform used for Video Visit: Caterina

## 2021-03-09 ENCOUNTER — VIRTUAL VISIT (OUTPATIENT)
Dept: UROLOGY | Facility: CLINIC | Age: 56
End: 2021-03-09
Payer: COMMERCIAL

## 2021-03-09 DIAGNOSIS — E66.01 MORBID OBESITY (H): ICD-10-CM

## 2021-03-09 DIAGNOSIS — C61 PROSTATE CANCER (H): Primary | ICD-10-CM

## 2021-03-09 PROCEDURE — 99213 OFFICE O/P EST LOW 20 MIN: CPT | Mod: 95 | Performed by: UROLOGY

## 2021-03-18 PROBLEM — M54.50 BILATERAL LOW BACK PAIN WITHOUT SCIATICA: Status: RESOLVED | Noted: 2021-01-12 | Resolved: 2021-03-18

## 2021-03-18 NOTE — PROGRESS NOTES
"Discharge Note    Progress reporting period is from initial evaluation date (please see noted date below) to Jan 26, 2021.  No linked episodes      Geovany failed to follow up and current status is unknown.  Please see information below for last relevant information on current status.  Patient seen for 2 visits.    SUBJECTIVE  Subjective changes noted by patient:  Patient reports that shortly after his first appointment, he re-injured his lower back when blowing snow and sort of lifted the blower which flared his pain. He recovered from that pain in about 3 days or so. He does feel better now than when he started therapy 2 weeks ago.     .  Current pain level is 1/10.     Previous pain level was   .   Changes in function:  Yes (See Goal flowsheet attached for changes in current functional level)  Adverse reaction to treatment or activity: None    OBJECTIVE  Changes noted in objective findings: Standing LROM: FB with fingertips to ankles and no change and BB mod loss and \"feels good\" and CÉSAR causes slight aching.     ASSESSMENT/PLAN  Diagnosis: L1 compression FX   Updated problem list and treatment plan:   Pain - HEP  Decreased ROM/flexibility - HEP  Decreased function - HEP  Decreased strength - HEP  Impaired muscle performance - HEP  Impaired posture - HEP  STG/LTGs have been met or progress has been made towards goals:  Yes, please see goal flowsheet for most current information  Assessment of Progress: current status is unknown.    Last current status: Pt is progressing as expected   Self Management Plans:  HEP  I have re-evaluated this patient and find that the nature, scope, duration and intensity of the therapy is appropriate for the medical condition of the patient.  Geovany continues to require the following intervention to meet STG and LTG's:  HEP.    Recommendations:  Discharge with current home program.  Patient to follow up with MD as needed.    Please refer to the daily flowsheet for treatment today, " total treatment time and time spent performing 1:1 timed codes.

## 2021-03-29 ENCOUNTER — IMMUNIZATION (OUTPATIENT)
Dept: NURSING | Facility: CLINIC | Age: 56
End: 2021-03-29
Payer: COMMERCIAL

## 2021-03-29 PROCEDURE — 91300 PR COVID VAC PFIZER DIL RECON 30 MCG/0.3 ML IM: CPT

## 2021-03-29 PROCEDURE — 0001A PR COVID VAC PFIZER DIL RECON 30 MCG/0.3 ML IM: CPT

## 2021-03-31 DIAGNOSIS — J45.20 INTERMITTENT ASTHMA, UNCOMPLICATED: ICD-10-CM

## 2021-04-01 RX ORDER — ALBUTEROL SULFATE 90 UG/1
2 AEROSOL, METERED RESPIRATORY (INHALATION) EVERY 4 HOURS PRN
Qty: 18 G | Refills: 0 | Status: SHIPPED | OUTPATIENT
Start: 2021-04-01 | End: 2021-04-27

## 2021-04-01 NOTE — TELEPHONE ENCOUNTER
"Requested Prescriptions   Pending Prescriptions Disp Refills    albuterol (PROAIR HFA/PROVENTIL HFA/VENTOLIN HFA) 108 (90 Base) MCG/ACT inhaler [Pharmacy Med Name: ALBUTEROL HFA (PROAIR) INHALER]  1     Sig: Inhale 2 puffs into the lungs every 4 hours as needed (for asthma symptoms)       Asthma Maintenance Inhalers - Anticholinergics Failed - 3/31/2021 10:19 AM        Failed - Asthma control assessment score within normal limits in last 6 months     Please review ACT score.           Passed - Patient is age 12 years or older        Passed - Medication is active on med list        Passed - Recent (6 mo) or future (30 days) visit within the authorizing provider's specialty     Patient had office visit in the last 6 months or has a visit in the next 30 days with authorizing provider or within the authorizing provider's specialty.  See \"Patient Info\" tab in inbasket, or \"Choose Columns\" in Meds & Orders section of the refill encounter.           Short-Acting Beta Agonist Inhalers Protocol  Failed - 3/31/2021 10:19 AM        Failed - Asthma control assessment score within normal limits in last 6 months     Please review ACT score.           Passed - Patient is age 12 or older        Passed - Medication is active on med list        Passed - Recent (6 mo) or future (30 days) visit within the authorizing provider's specialty     Patient had office visit in the last 6 months or has a visit in the next 30 days with authorizing provider or within the authorizing provider's specialty.  See \"Patient Info\" tab in inbasket, or \"Choose Columns\" in Meds & Orders section of the refill encounter.                 "

## 2021-04-05 NOTE — TELEPHONE ENCOUNTER
This writer attempted to contact patient on 04/05/21      Reason for call update ACT and left message.      If patient calls back:   2nd floor [place] Care Team (MA/TC) called. Inform patient that someone from the team will contact them, document that pt called and route to care team.         Nemo Chopra MA

## 2021-04-06 NOTE — TELEPHONE ENCOUNTER
Patient contacted, ACT update score 22. Patient states he only had to use rescue inhaler once in the last month. He is refill inhaler because medication almost done and just wants to make sure able to have more pumps in case needed in the future.    Sudheer Lam CMA

## 2021-04-06 NOTE — TELEPHONE ENCOUNTER
This writer attempted to contact patient on 04/06/21      Reason for call update ACT and left message.      If patient calls back:   2nd floor [place] Care Team (MA/TC) called. Inform patient that someone from the team will contact them, document that pt called and route to care team.     ACT mailed to patient's home address with stamped envelope.    Nemo Chopra MA

## 2021-04-07 DIAGNOSIS — I10 HYPERTENSION GOAL BP (BLOOD PRESSURE) < 140/90: ICD-10-CM

## 2021-04-07 RX ORDER — AMLODIPINE BESYLATE 10 MG/1
TABLET ORAL
Qty: 90 TABLET | Refills: 1 | Status: SHIPPED | OUTPATIENT
Start: 2021-04-07 | End: 2021-09-13

## 2021-04-07 ASSESSMENT — ASTHMA QUESTIONNAIRES: ACT_TOTALSCORE: 22

## 2021-04-07 NOTE — TELEPHONE ENCOUNTER
BP Readings from Last 3 Encounters:   02/08/21 (!) 180/98   01/11/21 134/82   01/01/21 130/80     Stacy Paul BSN, RN

## 2021-04-14 ENCOUNTER — TELEPHONE (OUTPATIENT)
Dept: FAMILY MEDICINE | Facility: CLINIC | Age: 56
End: 2021-04-14

## 2021-04-14 NOTE — TELEPHONE ENCOUNTER
Patient Quality Outreach      Summary:    Patient has the following on his problem list/HM:   Hypertension   Last three blood pressure readings:  BP Readings from Last 3 Encounters:   02/08/21 (!) 180/98   01/11/21 134/82   01/01/21 130/80     Blood pressure: Failed    HTN Guidelines:  ? 139/89     Patient is due/failing the following:   Hypertension follow-up visit    Type of outreach:    na    Questions for provider review:    None                                                                                                                                          Chart routed to Care Team.

## 2021-04-19 ENCOUNTER — IMMUNIZATION (OUTPATIENT)
Dept: NURSING | Facility: CLINIC | Age: 56
End: 2021-04-19
Attending: INTERNAL MEDICINE
Payer: COMMERCIAL

## 2021-04-19 PROCEDURE — 91300 PR COVID VAC PFIZER DIL RECON 30 MCG/0.3 ML IM: CPT

## 2021-04-19 PROCEDURE — 0002A PR COVID VAC PFIZER DIL RECON 30 MCG/0.3 ML IM: CPT

## 2021-04-25 DIAGNOSIS — E78.5 HYPERLIPIDEMIA LDL GOAL <100: ICD-10-CM

## 2021-04-27 RX ORDER — PRAVASTATIN SODIUM 80 MG/1
TABLET ORAL
Qty: 90 TABLET | Refills: 2 | Status: SHIPPED | OUTPATIENT
Start: 2021-04-27 | End: 2021-12-09

## 2021-05-10 NOTE — TELEPHONE ENCOUNTER
Scheduled Geovany for upcoming visit regarding blood pressure with [facility] on Wednesday, June 9 at 5:00pm.    Thank you,  Mariaa Pillai, Patient Representative

## 2021-06-09 ENCOUNTER — OFFICE VISIT (OUTPATIENT)
Dept: FAMILY MEDICINE | Facility: CLINIC | Age: 56
End: 2021-06-09
Payer: COMMERCIAL

## 2021-06-09 VITALS
HEIGHT: 73 IN | SYSTOLIC BLOOD PRESSURE: 154 MMHG | RESPIRATION RATE: 18 BRPM | OXYGEN SATURATION: 99 % | BODY MASS INDEX: 41.75 KG/M2 | TEMPERATURE: 99.3 F | DIASTOLIC BLOOD PRESSURE: 82 MMHG | HEART RATE: 76 BPM | WEIGHT: 315 LBS

## 2021-06-09 DIAGNOSIS — K21.00 GASTROESOPHAGEAL REFLUX DISEASE WITH ESOPHAGITIS, UNSPECIFIED WHETHER HEMORRHAGE: ICD-10-CM

## 2021-06-09 DIAGNOSIS — I10 HYPERTENSION GOAL BP (BLOOD PRESSURE) < 140/90: Primary | ICD-10-CM

## 2021-06-09 DIAGNOSIS — E11.65 TYPE 2 DIABETES MELLITUS WITH HYPERGLYCEMIA, WITHOUT LONG-TERM CURRENT USE OF INSULIN (H): ICD-10-CM

## 2021-06-09 DIAGNOSIS — N52.1 ERECTILE DYSFUNCTION DUE TO DISEASES CLASSIFIED ELSEWHERE: ICD-10-CM

## 2021-06-09 DIAGNOSIS — Z23 NEED FOR SHINGLES VACCINE: ICD-10-CM

## 2021-06-09 PROCEDURE — 99214 OFFICE O/P EST MOD 30 MIN: CPT | Mod: 25 | Performed by: FAMILY MEDICINE

## 2021-06-09 PROCEDURE — 90750 HZV VACC RECOMBINANT IM: CPT | Performed by: FAMILY MEDICINE

## 2021-06-09 PROCEDURE — 90471 IMMUNIZATION ADMIN: CPT | Performed by: FAMILY MEDICINE

## 2021-06-09 RX ORDER — PANTOPRAZOLE SODIUM 40 MG/1
40 TABLET, DELAYED RELEASE ORAL DAILY
Qty: 90 TABLET | Refills: 1 | Status: SHIPPED | OUTPATIENT
Start: 2021-06-09 | End: 2021-09-13

## 2021-06-09 RX ORDER — SILDENAFIL 100 MG/1
TABLET, FILM COATED ORAL
Qty: 36 TABLET | Refills: 3 | Status: SHIPPED | OUTPATIENT
Start: 2021-06-09 | End: 2022-01-31

## 2021-06-09 RX ORDER — SPIRONOLACTONE 50 MG/1
50 TABLET, FILM COATED ORAL DAILY
Qty: 90 TABLET | Refills: 0 | Status: SHIPPED | OUTPATIENT
Start: 2021-06-09 | End: 2021-09-02

## 2021-06-09 ASSESSMENT — MIFFLIN-ST. JEOR: SCORE: 2357.61

## 2021-06-09 ASSESSMENT — PAIN SCALES - GENERAL: PAINLEVEL: NO PAIN (0)

## 2021-06-09 NOTE — PROGRESS NOTES
Assessment & Plan     Hypertension goal BP (blood pressure) < 140/90  Not controlled - add spironolactone and continue other medications. Recheck BP and labs in 1 month.  - spironolactone (ALDACTONE) 50 MG tablet; Take 1 tablet (50 mg) by mouth daily  - Basic metabolic panel; Future    Type 2 diabetes mellitus with hyperglycemia, without long-term current use of insulin (H)  Uncertain control - recommended low carb diet, check labs in 1 month.  - Hemoglobin A1c; Future  - Basic metabolic panel; Future    Erectile dysfunction due to diseases classified elsewhere  Recurrent issue - add Viagra as this worked in the past.  - sildenafil (VIAGRA) 100 MG tablet; Take 0.5-1 tablets ( mg) by mouth daily as needed for erectile dysfunction Take 30 min to 4 hours before intercourse.  Never use with nitroglycerin, terazosin or doxazosin.    Gastroesophageal reflux disease with esophagitis, unspecified whether hemorrhage  New - avoid trigger foods and trial of protonix.  Notify me if cough and heartburn are not improved in 3- 5 days.  - pantoprazole (PROTONIX) 40 MG EC tablet; Take 1 tablet (40 mg) by mouth daily 30-60 minutes before first meal of the day.    Need for shingles vaccine    - ZOSTER VACCINE RECOMBINANT ADJUVANTED IM NJX    The uses and side effects, including black box warnings as appropriate, were discussed in detail.  All patient questions were answered.  The patient was instructed to call immediately if any side effects developed.     Return in about 4 weeks (around 7/7/2021) for ancillary, BP check, lab.    Mona Scales MD  [facility]    Khris Armenta is a 55 year old who presents for the following health issues     HPI     Hypertension Follow-up      Do you check your blood pressure regularly outside of the clinic? Yes     Are you following a low salt diet? No    Are your blood pressures ever more than 140 on the top number (systolic) OR more   than 90  "on the bottom number (diastolic), for example 140/90? Yes      How many servings of fruits and vegetables do you eat daily?  0-1    On average, how many sweetened beverages do you drink each day (Examples: soda, juice, sweet tea, etc.  Do NOT count diet or artificially sweetened beverages)?   2    How many days per week do you exercise enough to make your heart beat faster? 7    How many minutes a day do you exercise enough to make your heart beat faster? 20 - 29    How many days per week do you miss taking your medication? 0    Heartburn and cough for months. Better with tums. Diet has been poor with more fast foods. More stress as unable to fine a good job.    Review of Systems   Constitutional, HEENT, cardiovascular, pulmonary, gi and gu systems are negative, except as otherwise noted.      Objective    BP (!) 154/82 (BP Location: Left arm)   Pulse 76   Temp 99.3  F (37.4  C) (Tympanic)   Resp 18   Ht 1.854 m (6' 1\")   Wt 147.9 kg (326 lb)   SpO2 99%   BMI 43.01 kg/m    Body mass index is 43.01 kg/m .  Physical Exam   GENERAL: healthy, alert, no distress and obese  NECK: no adenopathy, no asymmetry, masses, or scars and thyroid normal to palpation  RESP: lungs clear to auscultation - no rales, rhonchi or wheezes  CV: regular rate and rhythm, normal S1 S2, no S3 or S4, no murmur, click or rub, no peripheral edema and peripheral pulses strong  ABDOMEN: soft, nontender, no hepatosplenomegaly, no masses and bowel sounds normal  MS: no gross musculoskeletal defects noted, no edema            "

## 2021-06-10 NOTE — PATIENT INSTRUCTIONS
Patient Education     Tips to Control Acid Reflux    To control acid reflux, you ll need to make some basic diet and lifestyle changes. The simple steps outlined below may be all you ll need to ease discomfort.   Watch what you eat    Don't have fatty foods or spicy foods.    Eat fewer acidic foods, such as citrus and tomato-based foods. These can increase symptoms.    Limit drinking alcohol, caffeine, and fizzy beverages. All increase acid reflux.    Try limiting chocolate, peppermint, and spearmint. These can make acid reflux worse in some people.    Watch when you eat    Don't lie down for 3 hours after eating.    Don't snack before going to bed.    Raise your head  Raising your head and upper body by 4 to 6 inches helps limit reflux when you re lying down. Put blocks under the head of your bed frame or a wedge under your mattress to raise it.   Other changes    Lose weight, if you need to    Don t exercise near bedtime    Don't wear tight-fitting clothes    Limit aspirin and ibuprofen    Stop smoking    [employer] last reviewed this educational content on 6/1/2019 2000-2021 [employer]. All rights reserved. This information is not intended as a substitute for professional medical care. Always follow your healthcare professional's instructions.

## 2021-06-30 DIAGNOSIS — J30.2 SEASONAL ALLERGIC RHINITIS, UNSPECIFIED TRIGGER: ICD-10-CM

## 2021-06-30 DIAGNOSIS — E11.9 TYPE 2 DIABETES, HBA1C GOAL < 7% (H): ICD-10-CM

## 2021-06-30 RX ORDER — FLUTICASONE PROPIONATE 50 MCG
1-2 SPRAY, SUSPENSION (ML) NASAL DAILY
Qty: 48 ML | Refills: 2 | Status: SHIPPED | OUTPATIENT
Start: 2021-06-30

## 2021-06-30 RX ORDER — BLOOD SUGAR DIAGNOSTIC
STRIP MISCELLANEOUS
Qty: 100 STRIP | Refills: 2 | Status: SHIPPED | OUTPATIENT
Start: 2021-06-30

## 2021-07-09 DIAGNOSIS — I10 HYPERTENSION GOAL BP (BLOOD PRESSURE) < 140/90: ICD-10-CM

## 2021-07-09 DIAGNOSIS — E11.65 TYPE 2 DIABETES MELLITUS WITH HYPERGLYCEMIA, WITHOUT LONG-TERM CURRENT USE OF INSULIN (H): ICD-10-CM

## 2021-07-09 DIAGNOSIS — C61 PROSTATE CANCER (H): ICD-10-CM

## 2021-07-09 LAB
ANION GAP SERPL CALCULATED.3IONS-SCNC: 5 MMOL/L (ref 3–14)
BUN SERPL-MCNC: 12 MG/DL (ref 7–30)
CALCIUM SERPL-MCNC: 9.3 MG/DL (ref 8.5–10.1)
CHLORIDE SERPL-SCNC: 105 MMOL/L (ref 94–109)
CO2 SERPL-SCNC: 27 MMOL/L (ref 20–32)
CREAT SERPL-MCNC: 0.86 MG/DL (ref 0.66–1.25)
GFR SERPL CREATININE-BSD FRML MDRD: >90 ML/MIN/{1.73_M2}
GLUCOSE SERPL-MCNC: 169 MG/DL (ref 70–99)
HBA1C MFR BLD: 7 % (ref 0–5.6)
POTASSIUM SERPL-SCNC: 4.1 MMOL/L (ref 3.4–5.3)
SODIUM SERPL-SCNC: 137 MMOL/L (ref 133–144)

## 2021-07-09 PROCEDURE — 36415 COLL VENOUS BLD VENIPUNCTURE: CPT | Performed by: FAMILY MEDICINE

## 2021-07-09 PROCEDURE — 80048 BASIC METABOLIC PNL TOTAL CA: CPT | Performed by: FAMILY MEDICINE

## 2021-07-09 PROCEDURE — 83036 HEMOGLOBIN GLYCOSYLATED A1C: CPT | Performed by: FAMILY MEDICINE

## 2021-07-13 NOTE — RESULT ENCOUNTER NOTE
Mr. Marte,    Your A1c was within the goal range for you. The goal for diabetics without other complications is less than 7. You should recheck your A1c in 3 months. In the meantime, a healthy diet high in lean protein, whole grain carbohydrates and healthy fats such as olive oil or canola will help mainain a healthy blood sugar  Your kidney function was normal.    Please contact the clinic if you have additional questions.  Thank you.    Sincerely,    Mona Scales MD

## 2021-09-01 DIAGNOSIS — I10 HYPERTENSION GOAL BP (BLOOD PRESSURE) < 140/90: ICD-10-CM

## 2021-09-01 NOTE — TELEPHONE ENCOUNTER
"Next 5 appointments (look out 90 days)      Sep 08, 2021 12:00 PM  Nurse Only with CHINA MCMANUS/LPN  [facility] ([facility] ) [address] [place] [id number] [phone]     Sep 27, 2021  9:45 AM  Return Visit with Zac Ivy MD  [facility] ([facility] ) [address] [place] [address]  [place] [id number] [phone]          Requested Prescriptions   Pending Prescriptions Disp Refills    spironolactone (ALDACTONE) 50 MG tablet [Pharmacy Med Name: SPIRONOLACTONE 50 MG TABLET] 90 tablet 0     Sig: TAKE 1 TABLET BY MOUTH EVERY DAY        Diuretics (Including Combos) Protocol Failed - 9/1/2021 12:28 AM        Failed - Blood pressure under 140/90 in past 12 months       BP Readings from Last 3 Encounters:   06/09/21 (!) 154/82   02/08/21 (!) 180/98   01/11/21 134/82                 Passed - Recent (12 mo) or future (30 days) visit within the authorizing provider's specialty     Patient has had an office visit with the authorizing provider or a provider within the authorizing providers department within the previous 12 mos or has a future within next 30 days. See \"Patient Info\" tab in inbasket, or \"Choose Columns\" in Meds & Orders section of the refill encounter.              Passed - Medication is active on med list        Passed - Patient is age 18 or older        Passed - Normal serum creatinine on file in past 12 months       Recent Labs   Lab Test 07/09/21 0913   CR 0.86              Passed - Normal serum potassium on file in past 12 months       Recent Labs   Lab Test 07/09/21 0913   POTASSIUM 4.1                    Passed - Normal serum sodium on file in past 12 months       Recent Labs   Lab Test 07/09/21 0913                       "

## 2021-09-02 RX ORDER — SPIRONOLACTONE 50 MG/1
TABLET, FILM COATED ORAL
Qty: 90 TABLET | Refills: 0 | Status: SHIPPED | OUTPATIENT
Start: 2021-09-02 | End: 2021-11-24

## 2021-09-08 ENCOUNTER — ALLIED HEALTH/NURSE VISIT (OUTPATIENT)
Dept: FAMILY MEDICINE | Facility: CLINIC | Age: 56
End: 2021-09-08
Payer: COMMERCIAL

## 2021-09-08 VITALS — SYSTOLIC BLOOD PRESSURE: 183 MMHG | DIASTOLIC BLOOD PRESSURE: 99 MMHG | HEART RATE: 71 BPM

## 2021-09-08 DIAGNOSIS — Z01.30 BP CHECK: Primary | ICD-10-CM

## 2021-09-08 PROCEDURE — 99207 PR NO CHARGE NURSE ONLY: CPT

## 2021-09-08 ASSESSMENT — PAIN SCALES - GENERAL: PAINLEVEL: NO PAIN (0)

## 2021-09-08 NOTE — PROGRESS NOTES
I met with Geovany Marte at the request of Ester to recheck his blood pressure.  Blood pressure medications on the med list were reviewed with patient.    Patient has taken all medications as per usual regimen: Yes  Patient reports tolerating them without any issues or concerns: Yes    There were no vitals filed for this visit.    After 5 minutes, the patient's blood pressure remained greater than or equal to 140/90.    Is the patient currently having any chest pain? No  Does the patient currently have a headache? No  Does the patient currently have any vision changes? No  Does the patient currently have any nausea? No  Does the patient currently have any abdominal pain? No    Patient was instructed to Make f/u appt with [facility] next week. .     Brittney Mg CMA

## 2021-09-13 ENCOUNTER — OFFICE VISIT (OUTPATIENT)
Dept: FAMILY MEDICINE | Facility: CLINIC | Age: 56
End: 2021-09-13
Payer: COMMERCIAL

## 2021-09-13 ENCOUNTER — LAB (OUTPATIENT)
Dept: LAB | Facility: CLINIC | Age: 56
End: 2021-09-13
Payer: COMMERCIAL

## 2021-09-13 VITALS
WEIGHT: 315 LBS | TEMPERATURE: 98.4 F | OXYGEN SATURATION: 97 % | HEART RATE: 65 BPM | BODY MASS INDEX: 43.67 KG/M2 | SYSTOLIC BLOOD PRESSURE: 130 MMHG | DIASTOLIC BLOOD PRESSURE: 80 MMHG

## 2021-09-13 DIAGNOSIS — J30.1 NON-SEASONAL ALLERGIC RHINITIS DUE TO POLLEN: Primary | ICD-10-CM

## 2021-09-13 DIAGNOSIS — I10 HYPERTENSION GOAL BP (BLOOD PRESSURE) < 140/90: ICD-10-CM

## 2021-09-13 DIAGNOSIS — H10.13 ALLERGIC CONJUNCTIVITIS OF BOTH EYES: ICD-10-CM

## 2021-09-13 DIAGNOSIS — C61 PROSTATE CANCER (H): ICD-10-CM

## 2021-09-13 PROCEDURE — 84153 ASSAY OF PSA TOTAL: CPT

## 2021-09-13 PROCEDURE — 99214 OFFICE O/P EST MOD 30 MIN: CPT | Performed by: FAMILY MEDICINE

## 2021-09-13 PROCEDURE — 36415 COLL VENOUS BLD VENIPUNCTURE: CPT

## 2021-09-13 RX ORDER — AZELASTINE HYDROCHLORIDE 0.5 MG/ML
1 SOLUTION/ DROPS OPHTHALMIC 2 TIMES DAILY
Qty: 6 ML | Refills: 11 | Status: SHIPPED | OUTPATIENT
Start: 2021-09-13 | End: 2021-09-22

## 2021-09-13 RX ORDER — AMLODIPINE BESYLATE 10 MG/1
10 TABLET ORAL DAILY
Qty: 90 TABLET | Refills: 1 | Status: SHIPPED | OUTPATIENT
Start: 2021-09-13 | End: 2022-01-31

## 2021-09-13 RX ORDER — FEXOFENADINE HCL AND PSEUDOEPHEDRINE HCL 180; 240 MG/1; MG/1
1 TABLET, EXTENDED RELEASE ORAL DAILY
Qty: 90 TABLET | Refills: 1 | Status: SHIPPED | OUTPATIENT
Start: 2021-09-13

## 2021-09-13 NOTE — PROGRESS NOTES
"    Assessment & Plan     Non-seasonal allergic rhinitis due to pollen  Not controlled - increase flonase to 2 sprays to each nostril and add allegra d.  Consider allergy referral if not improving  - fexofenadine-pseudoePHEDrine (ALLEGRA-D 24) 180-240 MG 24 hr tablet; Take 1 tablet by mouth daily    Allergic conjunctivitis of both eyes  Restart eye drop  - azelastine (OPTIVAR) 0.05 % ophthalmic solution; Apply 1 drop to eye 2 times daily    Hypertension goal BP (blood pressure) < 140/90  Controlled - continue current treatment  - amLODIPine (NORVASC) 10 MG tablet; Take 1 tablet (10 mg) by mouth daily       BMI:   Estimated body mass index is 43.67 kg/m  as calculated from the following:    Height as of 6/9/21: 1.854 m (6' 1\").    Weight as of this encounter: 150.1 kg (331 lb).   Weight management plan: Discussed healthy diet and exercise guidelines    The uses and side effects, including black box warnings as appropriate, were discussed in detail.  All patient questions were answered.  The patient was instructed to call immediately if any side effects developed.     Return in about 16 weeks (around 1/3/2022).    Mona Scales MD  [facility]    Khris Armenta is a 55 year old who presents for the following health issues     HPI     Allergies not well controlled. Adding Nyquil to help with symptoms. Having some itching eyes as well and not using drops.    Taking bp medications as directed.    Asthma doing well      Review of Systems   Constitutional, HEENT, cardiovascular, pulmonary, gi and gu systems are negative, except as otherwise noted.      Objective    /80 (BP Location: Right arm)   Pulse 65   Temp 98.4  F (36.9  C) (Tympanic)   Wt (!) 150.1 kg (331 lb)   SpO2 97%   BMI 43.67 kg/m    Body mass index is 43.67 kg/m .  Physical Exam   GENERAL: healthy, alert, no distress and obese  HENT: normal cephalic/atraumatic, ear canals and TM's normal, nasal mucosa " edematous , oropharynx clear and oral mucous membranes moist  NECK: no adenopathy, no asymmetry, masses, or scars and thyroid normal to palpation  RESP: lungs clear to auscultation - no rales, rhonchi or wheezes  CV: regular rate and rhythm, normal S1 S2, no S3 or S4, no murmur, click or rub, no peripheral edema and peripheral pulses strong  ABDOMEN: soft, nontender, no hepatosplenomegaly, no masses and bowel sounds normal  MS: no gross musculoskeletal defects noted, no edema  PSYCH: mentation appears normal, affect normal/bright

## 2021-09-13 NOTE — PATIENT INSTRUCTIONS
At [facility], we strive to deliver an exceptional experience to you, every time we see you. If you receive a survey, please complete it as we do value your feedback.  If you have MyChart, you can expect to receive results automatically within 24 hours of their completion.  Your provider will send a note interpreting your results as well.   If you do not have MyChart, you should receive your results in about a week by mail.    Your care team:                            Family Medicine Internal Medicine   MD Gurpreet Howard MD Shantel Branch-Fleming, MD Srinivasa Vaka, MD Katya Belousova, PAVICENTE Live, APRN CNP    Lee Yin, MD Pediatrics   Dmitry Chavez, PAVICENTE Portillo, CNP MD Mouna Turk APRN CNP   MD Marge Willson MD Deborah Mielke, MD Candelaria Denton, APRN [facility]      Clinic hours: Monday - Thursday 7 am-6 pm; Fridays 7 am-5 pm.   Urgent care: Monday - Friday 10 am- 8 pm; Saturday and Sunday 9 am-5 pm.    Clinic: [phone]       [facility]: Monday - Thursday 8 am - 7 pm; Friday 8 am - 6 pm  [facility]: [phone]     Use [email or web address] for 24/7 diagnosis and treatment of dozens of conditions.

## 2021-09-14 LAB — PSA SERPL-MCNC: 4.2 UG/L (ref 0–4)

## 2021-09-14 ASSESSMENT — ASTHMA QUESTIONNAIRES: ACT_TOTALSCORE: 20

## 2021-09-22 ENCOUNTER — TELEPHONE (OUTPATIENT)
Dept: FAMILY MEDICINE | Facility: CLINIC | Age: 56
End: 2021-09-22

## 2021-09-22 DIAGNOSIS — H10.13 ALLERGIC CONJUNCTIVITIS OF BOTH EYES: ICD-10-CM

## 2021-09-22 RX ORDER — AZELASTINE HYDROCHLORIDE 0.5 MG/ML
1 SOLUTION/ DROPS OPHTHALMIC 2 TIMES DAILY
Qty: 18 ML | Refills: 1 | Status: SHIPPED | OUTPATIENT
Start: 2021-09-22 | End: 2022-07-06

## 2021-09-26 ENCOUNTER — HEALTH MAINTENANCE LETTER (OUTPATIENT)
Age: 56
End: 2021-09-26

## 2021-09-27 ENCOUNTER — OFFICE VISIT (OUTPATIENT)
Dept: UROLOGY | Facility: CLINIC | Age: 56
End: 2021-09-27
Payer: COMMERCIAL

## 2021-09-27 VITALS — DIASTOLIC BLOOD PRESSURE: 95 MMHG | SYSTOLIC BLOOD PRESSURE: 171 MMHG | HEART RATE: 73 BPM | OXYGEN SATURATION: 97 %

## 2021-09-27 DIAGNOSIS — C61 PROSTATE CANCER (H): Primary | ICD-10-CM

## 2021-09-27 DIAGNOSIS — I10 HYPERTENSION GOAL BP (BLOOD PRESSURE) < 140/90: ICD-10-CM

## 2021-09-27 PROCEDURE — 99213 OFFICE O/P EST LOW 20 MIN: CPT | Performed by: UROLOGY

## 2021-09-27 NOTE — PROGRESS NOTES
Chief Complaint   Patient presents with     RECHECK       Geovany Marte is a 55 year old male who presents today for follow up of   Chief Complaint   Patient presents with     RECHECK    f/u for prostate cancer group 1 under active observation.  He is without any complaints.  Recent psa was 4.2 down from 5.14.    Current Outpatient Medications   Medication Sig Dispense Refill     ACCU-CHEK GUIDE test strip USE TO TEST BLOOD SUGAR 1 TIME DAILY OR AS DIRECTED 100 strip 2     ACE/ARB NOT PRESCRIBED, INTENTIONAL, continuous prn for other Reported on 4/3/2017       albuterol (PROAIR HFA/PROVENTIL HFA/VENTOLIN HFA) 108 (90 Base) MCG/ACT inhaler INHALE 2 PUFFS INTO THE LUNGS EVERY 4 HOURS AS NEEDED (FOR ASTHMA SYMPTOMS) 18 g 5     amLODIPine (NORVASC) 10 MG tablet Take 1 tablet (10 mg) by mouth daily 90 tablet 1     ASPIRIN 81 MG PO TABS 1 TABLET DAILY (*)       azelastine (OPTIVAR) 0.05 % ophthalmic solution Apply 1 drop to eye 2 times daily 18 mL 1     bisacodyl (DULCOLAX) 5 MG EC tablet Take 3 tablets (15 mg) by mouth See Admin Instructions --Take at 5 PM day prior to procedure 3 tablet 0     blood glucose (NO BRAND SPECIFIED) lancets standard Use to test blood sugar 1 times daily or as directed. 100 each 3     blood glucose (NO BRAND SPECIFIED) test strip Use to test blood sugar 1 times daily or as directed. 100 each 3     Blood Glucose Monitoring Suppl (ACCU-CHEK GUIDE ME) w/Device KIT USE TO TEST BLOOD SUGAR 1 TIMES DAILY OR AS DIRECTED 1 kit 0     Blood Pressure Monitoring (BLOOD PRESSURE CUFF) MISC Use to check blood pressure 3 - 4 times per week. 1 each 0     Calcium-Magnesium-Vitamin D (CITRACAL CALCIUM+D PO) 3 times daily. Take one tablet twice daily.       carvedilol (COREG) 25 MG tablet TAKE 1 TABLET BY MOUTH TWICE A DAY WITH MEALS 180 tablet 3     EPINEPHrine (AUVI-Q) 0.3 MG/0.3ML injection 2-pack Inject 0.3 mLs (0.3 mg) into the muscle as needed for anaphylaxis 4 mL 1     fexofenadine-pseudoePHEDrine  (ALLEGRA-D 24) 180-240 MG 24 hr tablet Take 1 tablet by mouth daily 90 tablet 1     fluticasone (FLONASE) 50 MCG/ACT nasal spray SPRAY 1-2 SPRAYS INTO BOTH NOSTRILS DAILY 48 mL 2     hydrOXYzine (ATARAX) 50 MG tablet TAKE 1 TABLET (50 MG) BY MOUTH EVERY 8 HOURS AS NEEDED FOR ANXIETY 60 tablet 3     KETOROLAC TROMETHAMINE PO        ORDER FOR DME Injection Supplies for Vitamin B12: 3cc syringes w/ 27 gauge needles, 1/2 inch length 12 each 0     ORDER FOR DME One touch glucometer with supplies to replace old meter for testing twice daily 1 kit prn     polyethylene glycol (GOLYTELY) 236 g suspension Take 4,000 mLs (4 L) by mouth See Admin Instructions --Drink half gallon (64oz) at 6pm on evening prior to procedure and second half on the morning of procedure (4 hours prior to procedure time) 4 L 0     pravastatin (PRAVACHOL) 80 MG tablet TAKE 1 TABLET BY MOUTH EVERY EVENING 90 tablet 2     Pseudoephedrine-APAP-DM (DAYQUIL OR)        sildenafil (VIAGRA) 100 MG tablet Take 0.5-1 tablets ( mg) by mouth daily as needed for erectile dysfunction Take 30 min to 4 hours before intercourse.  Never use with nitroglycerin, terazosin or doxazosin. 36 tablet 3     spironolactone (ALDACTONE) 50 MG tablet TAKE 1 TABLET BY MOUTH EVERY DAY 90 tablet 0     tiZANidine (ZANAFLEX) 4 MG tablet Take 1 tablet (4 mg) by mouth 3 times daily as needed for muscle spasms 30 tablet 1     Allergies   Allergen Reactions     Lisinopril Anaphylaxis     Shellfish Allergy Difficulty breathing     Throat closing up     Shellfish-Derived Products      Throat swells up, hard time breathing      Past Medical History:   Diagnosis Date     Anemia      Asthma      DM (diabetes mellitus) (H)      GERD (gastroesophageal reflux disease)      HTN      Hyperlipidemia      Morbid obesity (H)      Nonsenile cataract      Nuclear sclerosis 5/17/2013     Past Surgical History:   Procedure Laterality Date     ENT SURGERY      tonsils removed at 19 years old      ESOPHAGOSCOPY, GASTROSCOPY, DUODENOSCOPY (EGD), COMBINED N/A 3/18/2015    Procedure: COMBINED ESOPHAGOSCOPY, GASTROSCOPY, DUODENOSCOPY (EGD);  Surgeon: Jae Robles MD;  Location: [facility]     LAPAROSCOPIC BYPASS GASTRIC  6/22/2011    Procedure:LAPAROSCOPIC BYPASS GASTRIC; Surgeon:HANNAH SHEPARD; Location: OR     Family History   Problem Relation Age of Onset     Cerebrovascular Disease Mother      Hypertension Mother      Diabetes Father      Hypertension Father      Heart Disease Brother      Breast Cancer Sister      Cancer Sister      Cerebrovascular Disease Brother      Breast Cancer Sister      Asthma Son      Thyroid Disease No family hx of      Glaucoma No family hx of      Macular Degeneration No family hx of      Social History     Socioeconomic History     Marital status:      Spouse name: None     Number of children: 7     Years of education: 14     Highest education level: None   Occupational History     Occupation:      Employer: [employer]   Tobacco Use     Smoking status: Former Smoker     Types: Cigarettes     Smokeless tobacco: Never Used     Tobacco comment: occasional   Substance and Sexual Activity     Alcohol use: Yes     Alcohol/week: 0.0 standard drinks     Comment: Once a week.     Drug use: No     Sexual activity: Yes     Partners: Female     Birth control/protection: None   Other Topics Concern     Parent/sibling w/ CABG, MI or angioplasty before 65F 55M? No      Service No     Blood Transfusions No     Caffeine Concern No     Occupational Exposure No     Hobby Hazards No     Sleep Concern No     Stress Concern No     Weight Concern No     Special Diet No     Back Care No     Exercise Yes     Bike Helmet No     Seat Belt Yes     Self-Exams No   Social History Narrative     None     Social Determinants of Health     Financial Resource Strain:      Difficulty of Paying Living Expenses:    Food Insecurity:      Worried About Running Out of Food in the Last  Year:      Ran Out of Food in the Last Year:    Transportation Needs:      Lack of Transportation (Medical):      Lack of Transportation (Non-Medical):    Physical Activity:      Days of Exercise per Week:      Minutes of Exercise per Session:    Stress:      Feeling of Stress :    Social Connections:      Frequency of Communication with Friends and Family:      Frequency of Social Gatherings with Friends and Family:      Attends Jewish Services:      Active Member of Clubs or Organizations:      Attends Club or Organization Meetings:      Marital Status:    Intimate Partner Violence:      Fear of Current or Ex-Partner:      Emotionally Abused:      Physically Abused:      Sexually Abused:        REVIEW OF SYSTEMS  =================  C: NEGATIVE for fever, chills, change in weight  I: NEGATIVE for worrisome rashes, moles or lesions  E/M: NEGATIVE for ear, mouth and throat problems  R: NEGATIVE for significant cough or SHORTNESS OF BREATH  CV:  NEGATIVE for chest pain, palpitations or peripheral edema  GI: NEGATIVE for nausea, abdominal pain, heartburn, or change in bowel habits  NEURO: NEGATIVE numbness/weakness  : see HPI  PSYCH: NEGATIVE depression/anxiety  LYmph: no new enlarged lymph nodes  Ortho: no new trauma/movements    Physical Exam:  BP (!) 171/95 (BP Location: Right arm, Patient Position: Chair, Cuff Size: Adult Large)   Pulse 73   SpO2 97%    Patient is pleasant, in no acute distress, good general condition.  Lung: no evidence of respiratory distress    Abdomen: Soft, nondistended, non tender. No masses. No rebound or guarding.   Exam: penis no discharge. Testis no masses.  No scrotal skin lesion.  Prostate apex only.  Skin: Warm and dry.  No redness.  Psych: normal mood and affect  Neuro: alert and oriented  Musculaskeletal: moving all extremities    Assessment/Plan:   (C61) Prostate cancer (H)  (primary encounter diagnosis)  Comment:    Plan: PSA tumor marker, MR Prostate wo & w Contrast           In six months    (I10) Hypertension goal BP (blood pressure) < 140/90  Comment:    Plan: Geovany to follow up with Primary Care provider regarding elevated blood pressure.

## 2021-09-27 NOTE — PATIENT INSTRUCTIONS
Please call the [facility] radiology to schedule an MRI of the prostate in 6 months at [phone].  Please call our office to schedule your follow up once you have the test scheduled, [phone].  Please contact your insurance company to make sure the MRI scan is covered under your insurance plan.

## 2021-11-24 DIAGNOSIS — I10 HYPERTENSION GOAL BP (BLOOD PRESSURE) < 140/90: ICD-10-CM

## 2021-11-24 RX ORDER — SPIRONOLACTONE 50 MG/1
TABLET, FILM COATED ORAL
Qty: 90 TABLET | Refills: 0 | Status: SHIPPED | OUTPATIENT
Start: 2021-11-24 | End: 2022-01-31

## 2021-11-24 NOTE — TELEPHONE ENCOUNTER
Routing refill request to provider for review/approval because:  BP Readings from Last 3 Encounters:   09/27/21 (!) 171/95   09/13/21 130/80   09/08/21 (!) 183/99     Lisset ARROYON, RN

## 2022-01-16 ENCOUNTER — HEALTH MAINTENANCE LETTER (OUTPATIENT)
Age: 57
End: 2022-01-16

## 2022-01-31 ENCOUNTER — OFFICE VISIT (OUTPATIENT)
Dept: FAMILY MEDICINE | Facility: CLINIC | Age: 57
End: 2022-01-31
Payer: MEDICAID

## 2022-01-31 VITALS
OXYGEN SATURATION: 97 % | DIASTOLIC BLOOD PRESSURE: 78 MMHG | TEMPERATURE: 99 F | RESPIRATION RATE: 18 BRPM | HEART RATE: 77 BPM | SYSTOLIC BLOOD PRESSURE: 120 MMHG | WEIGHT: 315 LBS | BODY MASS INDEX: 41.75 KG/M2 | HEIGHT: 73 IN

## 2022-01-31 DIAGNOSIS — Z23 NEED FOR HEPATITIS B BOOSTER VACCINATION: ICD-10-CM

## 2022-01-31 DIAGNOSIS — E78.5 HYPERLIPIDEMIA LDL GOAL <100: ICD-10-CM

## 2022-01-31 DIAGNOSIS — L20.84 INTRINSIC ATOPIC DERMATITIS: ICD-10-CM

## 2022-01-31 DIAGNOSIS — I10 HYPERTENSION GOAL BP (BLOOD PRESSURE) < 140/90: ICD-10-CM

## 2022-01-31 DIAGNOSIS — N52.1 ERECTILE DYSFUNCTION DUE TO DISEASES CLASSIFIED ELSEWHERE: ICD-10-CM

## 2022-01-31 DIAGNOSIS — E11.65 TYPE 2 DIABETES MELLITUS WITH HYPERGLYCEMIA, WITHOUT LONG-TERM CURRENT USE OF INSULIN (H): Primary | ICD-10-CM

## 2022-01-31 LAB
ANION GAP SERPL CALCULATED.3IONS-SCNC: 6 MMOL/L (ref 3–14)
BUN SERPL-MCNC: 9 MG/DL (ref 7–30)
CALCIUM SERPL-MCNC: 9.5 MG/DL (ref 8.5–10.1)
CHLORIDE BLD-SCNC: 101 MMOL/L (ref 94–109)
CHOLEST SERPL-MCNC: 188 MG/DL
CO2 SERPL-SCNC: 28 MMOL/L (ref 20–32)
CREAT SERPL-MCNC: 0.8 MG/DL (ref 0.66–1.25)
CREAT UR-MCNC: 165 MG/DL
FASTING STATUS PATIENT QL REPORTED: YES
GFR SERPL CREATININE-BSD FRML MDRD: >90 ML/MIN/1.73M2
GLUCOSE BLD-MCNC: 166 MG/DL (ref 70–99)
HBA1C MFR BLD: 7.5 % (ref 0–5.6)
HDLC SERPL-MCNC: 43 MG/DL
LDLC SERPL CALC-MCNC: 110 MG/DL
MICROALBUMIN UR-MCNC: 6 MG/L
MICROALBUMIN/CREAT UR: 3.64 MG/G CR (ref 0–17)
NONHDLC SERPL-MCNC: 145 MG/DL
POTASSIUM BLD-SCNC: 4.1 MMOL/L (ref 3.4–5.3)
SODIUM SERPL-SCNC: 135 MMOL/L (ref 133–144)
TRIGL SERPL-MCNC: 175 MG/DL

## 2022-01-31 PROCEDURE — 36415 COLL VENOUS BLD VENIPUNCTURE: CPT | Performed by: FAMILY MEDICINE

## 2022-01-31 PROCEDURE — 80061 LIPID PANEL: CPT | Performed by: FAMILY MEDICINE

## 2022-01-31 PROCEDURE — 82043 UR ALBUMIN QUANTITATIVE: CPT | Performed by: FAMILY MEDICINE

## 2022-01-31 PROCEDURE — 99214 OFFICE O/P EST MOD 30 MIN: CPT | Mod: 25 | Performed by: FAMILY MEDICINE

## 2022-01-31 PROCEDURE — 80048 BASIC METABOLIC PNL TOTAL CA: CPT | Performed by: FAMILY MEDICINE

## 2022-01-31 PROCEDURE — 83036 HEMOGLOBIN GLYCOSYLATED A1C: CPT | Performed by: FAMILY MEDICINE

## 2022-01-31 PROCEDURE — 90471 IMMUNIZATION ADMIN: CPT | Performed by: FAMILY MEDICINE

## 2022-01-31 PROCEDURE — 90746 HEPB VACCINE 3 DOSE ADULT IM: CPT | Performed by: FAMILY MEDICINE

## 2022-01-31 RX ORDER — PRAVASTATIN SODIUM 80 MG/1
80 TABLET ORAL EVERY EVENING
Qty: 90 TABLET | Refills: 3 | Status: SHIPPED | OUTPATIENT
Start: 2022-01-31 | End: 2022-07-29

## 2022-01-31 RX ORDER — AMLODIPINE BESYLATE 10 MG/1
10 TABLET ORAL DAILY
Qty: 90 TABLET | Refills: 1 | Status: SHIPPED | OUTPATIENT
Start: 2022-01-31 | End: 2022-06-28

## 2022-01-31 RX ORDER — SPIRONOLACTONE 50 MG/1
50 TABLET, FILM COATED ORAL DAILY
Qty: 90 TABLET | Refills: 1 | Status: SHIPPED | OUTPATIENT
Start: 2022-01-31 | End: 2022-04-25

## 2022-01-31 RX ORDER — CLOTRIMAZOLE AND BETAMETHASONE DIPROPIONATE 10; .64 MG/G; MG/G
CREAM TOPICAL 2 TIMES DAILY
Qty: 45 G | Refills: 0 | Status: SHIPPED | OUTPATIENT
Start: 2022-01-31 | End: 2022-02-14

## 2022-01-31 RX ORDER — SILDENAFIL 100 MG/1
TABLET, FILM COATED ORAL
Qty: 36 TABLET | Refills: 3 | Status: SHIPPED | OUTPATIENT
Start: 2022-01-31 | End: 2022-10-04

## 2022-01-31 RX ORDER — CARVEDILOL 25 MG/1
12.5 TABLET ORAL 2 TIMES DAILY WITH MEALS
Qty: 180 TABLET | Refills: 3 | Status: SHIPPED | OUTPATIENT
Start: 2022-01-31 | End: 2022-04-19

## 2022-01-31 ASSESSMENT — MIFFLIN-ST. JEOR: SCORE: 2366.21

## 2022-01-31 ASSESSMENT — PAIN SCALES - GENERAL: PAINLEVEL: NO PAIN (1)

## 2022-01-31 NOTE — PROGRESS NOTES
Assessment & Plan     Type 2 diabetes mellitus with hyperglycemia, without long-term current use of insulin (H)  Uncertain control - check albs and medication adjustments as indicated  - HEMOGLOBIN A1C; Future  - BASIC METABOLIC PANEL; Future  - Lipid panel reflex to direct LDL Fasting; Future  - Albumin Random Urine Quantitative with Creat Ratio; Future  - HEMOGLOBIN A1C  - BASIC METABOLIC PANEL  - Lipid panel reflex to direct LDL Fasting  - Albumin Random Urine Quantitative with Creat Ratio    Hypertension goal BP (blood pressure) < 140/90  Controlled - continue current treatment and check labs.  - Lipid panel reflex to direct LDL Fasting; Future  - amLODIPine (NORVASC) 10 MG tablet; Take 1 tablet (10 mg) by mouth daily  - carvedilol (COREG) 25 MG tablet; Take 0.5 tablets (12.5 mg) by mouth 2 times daily (with meals)  - spironolactone (ALDACTONE) 50 MG tablet; Take 1 tablet (50 mg) by mouth daily  - Lipid panel reflex to direct LDL Fasting    Need for hepatitis B booster vaccination      Hyperlipidemia LDL goal <100  Stable - continue current medication.  - pravastatin (PRAVACHOL) 80 MG tablet; Take 1 tablet (80 mg) by mouth every evening    Erectile dysfunction due to diseases classified elsewhere  Unable to fill before due to insurance - reordered with new insurance.  - sildenafil (VIAGRA) 100 MG tablet; Take 0.5-1 tablets ( mg) by mouth daily as needed for erectile dysfunction Take 30 min to 4 hours before intercourse.  Never use with nitroglycerin, terazosin or doxazosin.    Intrinsic atopic dermatitis  Topical treatment and remove any new products  - clotrimazole-betamethasone (LOTRISONE) 1-0.05 % external cream; Apply topically 2 times daily      The uses and side effects, including black box warnings as appropriate, were discussed in detail.  All patient questions were answered.  The patient was instructed to call immediately if any side effects developed.     Return in about 6 months (around  "7/31/2022).    Mona Scales MD  [facility] KRYSTYNA Armenta is a 56 year old who presents for the following health issues   History of Present Illness       Diabetes:   He presents for follow up of diabetes.  He is checking home blood glucose a few times a month. He checks blood glucose before meals.  Blood glucose is never over 200 and never under 70. He is aware of hypoglycemia symptoms including shakiness. He has no concerns regarding his diabetes at this time.  He is not experiencing numbness or burning in feet, excessive thirst, blurry vision, weight changes or redness, sores or blisters on feet. The patient has had a diabetic eye exam in the last 12 months. Eye exam performed on March 2021. Location of last eye exam March 2021.          Rash back which comes and goes over the last 10 days. Maybe worse pop  Nipple sensitivity for about 3 - 4 days that also comes and goes.  Started Neuro modulator for fatigue about 7 days ago.    Review of Systems   Constitutional, HEENT, cardiovascular, pulmonary, gi and gu systems are negative, except as otherwise noted.      Objective    /78 (BP Location: Left arm)   Pulse 77   Temp 99  F (37.2  C) (Tympanic)   Resp 18   Ht 1.854 m (6' 1\")   Wt 149.2 kg (329 lb)   SpO2 97%   BMI 43.41 kg/m    Body mass index is 43.41 kg/m .  Physical Exam   GENERAL: healthy, alert, no distress and obese  NECK: no adenopathy, no asymmetry, masses, or scars and thyroid normal to palpation  RESP: lungs clear to auscultation - no rales, rhonchi or wheezes  CV: regular rate and rhythm, normal S1 S2, no S3 or S4, no murmur, click or rub, no peripheral edema and peripheral pulses strong  ABDOMEN: soft, nontender, no hepatosplenomegaly, no masses and bowel sounds normal  MS: no gross musculoskeletal defects noted, no edema  SKIN: raised hyperpigmented excoriated rash on low back  PSYCH: mentation appears normal, affect " normal/bright  Diabetic foot exam: normal DP and PT pulses, no trophic changes or ulcerative lesions, normal sensory exam and normal monofilament exam

## 2022-01-31 NOTE — PATIENT INSTRUCTIONS
At [facility], we strive to deliver an exceptional experience to you, every time we see you. If you receive a survey, please complete it as we do value your feedback.  If you have MyChart, you can expect to receive results automatically within 24 hours of their completion.  Your provider will send a note interpreting your results as well.   If you do not have MyChart, you should receive your results in about a week by mail.    Your care team:                            Family Medicine Internal Medicine   MD Gurpreet Howard MD Shantel Branch-Fleming, MD Srinivasa Vaka, MD Katya Belousova, PAVICENTE Live, APRN CNP    Lee Yin, MD Pediatrics   Dmitry Chavez, PAVICENTE Portillo, CNP MD Mouna Turk APRN CNP   MD Marge Willson MD Deborah Mielke, MD Candelaria Denton, APRN [facility]      Clinic hours: Monday - Thursday 7 am-6 pm; Fridays 7 am-5 pm.   Urgent care: Monday - Friday 10 am- 8 pm; Saturday and Sunday 9 am-5 pm.    Clinic: [phone]       [facility]: Monday - Thursday 8 am - 7 pm; Friday 8 am - 6 pm  [facility]: [phone]     Use [email or web address] for 24/7 diagnosis and treatment of dozens of conditions.

## 2022-01-31 NOTE — NURSING NOTE
Prior to immunization administration, verified patients identity using patient s name and date of birth. Please see Immunization Activity for additional information.     Screening Questionnaire for Adult Immunization    Are you sick today?   No   Do you have allergies to medications, food, a vaccine component or latex?   No   Have you ever had a serious reaction after receiving a vaccination?   No   Do you have a long-term health problem with heart, lung, kidney, or metabolic disease (e.g., diabetes), asthma, a blood disorder, no spleen, complement component deficiency, a cochlear implant, or a spinal fluid leak?  Are you on long-term aspirin therapy?   No   Do you have cancer, leukemia, HIV/AIDS, or any other immune system problem?   No   Do you have a parent, brother, or sister with an immune system problem?   No   In the past 3 months, have you taken medications that affect  your immune system, such as prednisone, other steroids, or anticancer drugs; drugs for the treatment of rheumatoid arthritis, Crohn s disease, or psoriasis; or have you had radiation treatments?   No   Have you had a seizure, or a brain or other nervous system problem?   No   During the past year, have you received a transfusion of blood or blood    products, or been given immune (gamma) globulin or antiviral drug?   No   For women: Are you pregnant or is there a chance you could become       pregnant during the next month?   No   Have you received any vaccinations in the past 4 weeks?   No     Immunization questionnaire answers were all negative.        Per orders of Dr. Tiffanie Scales, injection of Hepatitis B given by Migdalia Lozano RN. Patient instructed to remain in clinic for 15 minutes afterwards, and to report any adverse reaction to me immediately.       Screening performed by Migdalia Lozano RN on 1/31/2022 at 12:22 PM.

## 2022-02-03 NOTE — RESULT ENCOUNTER NOTE
Mr. Marte,    All of your labs were normal for you expect your diabetes is worse. Please reduce your sugar, bread, rice, potatoes and pasta intake while increasing your exercise.  Continue your current medications. Follow up in 6 months for a recheck.    Please contact the clinic if you have additional questions.  Thank you.    Sincerely,    Mona Scales MD

## 2022-02-11 DIAGNOSIS — L20.84 INTRINSIC ATOPIC DERMATITIS: ICD-10-CM

## 2022-02-14 RX ORDER — CLOTRIMAZOLE AND BETAMETHASONE DIPROPIONATE 10; .64 MG/G; MG/G
CREAM TOPICAL
Qty: 45 G | Refills: 0 | Status: SHIPPED | OUTPATIENT
Start: 2022-02-14 | End: 2022-07-06

## 2022-02-14 NOTE — TELEPHONE ENCOUNTER
Prescription approved per [facility] Refill Protocol.  Denise Kong RN, BSN   [facility]

## 2022-03-13 ENCOUNTER — HEALTH MAINTENANCE LETTER (OUTPATIENT)
Age: 57
End: 2022-03-13

## 2022-03-28 ENCOUNTER — LAB (OUTPATIENT)
Dept: LAB | Facility: CLINIC | Age: 57
End: 2022-03-28
Payer: COMMERCIAL

## 2022-03-28 ENCOUNTER — ANCILLARY PROCEDURE (OUTPATIENT)
Dept: MRI IMAGING | Facility: CLINIC | Age: 57
End: 2022-03-28
Attending: UROLOGY
Payer: COMMERCIAL

## 2022-03-28 DIAGNOSIS — C61 PROSTATE CANCER (H): ICD-10-CM

## 2022-03-28 LAB — PSA SERPL-MCNC: 3.56 UG/L (ref 0–4)

## 2022-03-28 PROCEDURE — A9585 GADOBUTROL INJECTION: HCPCS | Performed by: RADIOLOGY

## 2022-03-28 PROCEDURE — 72197 MRI PELVIS W/O & W/DYE: CPT | Performed by: RADIOLOGY

## 2022-03-28 PROCEDURE — 36415 COLL VENOUS BLD VENIPUNCTURE: CPT | Performed by: PATHOLOGY

## 2022-03-28 PROCEDURE — 84153 ASSAY OF PSA TOTAL: CPT | Performed by: PATHOLOGY

## 2022-03-28 RX ORDER — GADOBUTROL 604.72 MG/ML
15 INJECTION INTRAVENOUS ONCE
Status: COMPLETED | OUTPATIENT
Start: 2022-03-28 | End: 2022-03-28

## 2022-03-28 RX ADMIN — GADOBUTROL 15 ML: 604.72 INJECTION INTRAVENOUS at 12:18

## 2022-04-04 ENCOUNTER — OFFICE VISIT (OUTPATIENT)
Dept: UROLOGY | Facility: CLINIC | Age: 57
End: 2022-04-04
Payer: COMMERCIAL

## 2022-04-04 VITALS
RESPIRATION RATE: 14 BRPM | OXYGEN SATURATION: 100 % | SYSTOLIC BLOOD PRESSURE: 173 MMHG | DIASTOLIC BLOOD PRESSURE: 102 MMHG | HEART RATE: 72 BPM

## 2022-04-04 DIAGNOSIS — I10 HYPERTENSION GOAL BP (BLOOD PRESSURE) < 140/90: ICD-10-CM

## 2022-04-04 DIAGNOSIS — C61 PROSTATE CANCER (H): Primary | ICD-10-CM

## 2022-04-04 PROCEDURE — 99213 OFFICE O/P EST LOW 20 MIN: CPT | Performed by: UROLOGY

## 2022-04-04 NOTE — PROGRESS NOTES
Chief Complaint   Patient presents with     RECHECK       Geovany Marte is a 56 year old male who presents today for follow up of   Chief Complaint   Patient presents with     RECHECK    f/u for prostate cancer group 1 under active observation.  He is without any complaints.  Recent psa was 3.56.  Recent prostate MRI showed PIRADS 2 nodule only.    Current Outpatient Medications   Medication Sig Dispense Refill     ACCU-CHEK GUIDE test strip USE TO TEST BLOOD SUGAR 1 TIME DAILY OR AS DIRECTED 100 strip 2     ACE/ARB NOT PRESCRIBED, INTENTIONAL, continuous prn for other Reported on 4/3/2017       albuterol (PROAIR HFA/PROVENTIL HFA/VENTOLIN HFA) 108 (90 Base) MCG/ACT inhaler INHALE 2 PUFFS INTO THE LUNGS EVERY 4 HOURS AS NEEDED (FOR ASTHMA SYMPTOMS) 18 g 5     amLODIPine (NORVASC) 10 MG tablet Take 1 tablet (10 mg) by mouth daily 90 tablet 1     ASPIRIN 81 MG PO TABS 1 TABLET DAILY (*)       azelastine (OPTIVAR) 0.05 % ophthalmic solution Apply 1 drop to eye 2 times daily 18 mL 1     bisacodyl (DULCOLAX) 5 MG EC tablet Take 3 tablets (15 mg) by mouth See Admin Instructions --Take at 5 PM day prior to procedure 3 tablet 0     blood glucose (NO BRAND SPECIFIED) lancets standard Use to test blood sugar 1 times daily or as directed. 100 each 3     blood glucose (NO BRAND SPECIFIED) test strip Use to test blood sugar 1 times daily or as directed. 100 each 3     Blood Glucose Monitoring Suppl (ACCU-CHEK GUIDE ME) w/Device KIT USE TO TEST BLOOD SUGAR 1 TIMES DAILY OR AS DIRECTED 1 kit 0     Blood Pressure Monitoring (BLOOD PRESSURE CUFF) MISC Use to check blood pressure 3 - 4 times per week. 1 each 0     Calcium-Magnesium-Vitamin D (CITRACAL CALCIUM+D PO) 3 times daily. Take one tablet twice daily.       carvedilol (COREG) 25 MG tablet Take 0.5 tablets (12.5 mg) by mouth 2 times daily (with meals) 180 tablet 3     clotrimazole-betamethasone (LOTRISONE) 1-0.05 % external cream APPLY TO AFFECTED AREA TWICE A DAY 45 g 0      EPINEPHrine (AUVI-Q) 0.3 MG/0.3ML injection 2-pack Inject 0.3 mLs (0.3 mg) into the muscle as needed for anaphylaxis 4 mL 1     fexofenadine-pseudoePHEDrine (ALLEGRA-D 24) 180-240 MG 24 hr tablet Take 1 tablet by mouth daily 90 tablet 1     fluticasone (FLONASE) 50 MCG/ACT nasal spray SPRAY 1-2 SPRAYS INTO BOTH NOSTRILS DAILY 48 mL 2     hydrOXYzine (ATARAX) 50 MG tablet TAKE 1 TABLET (50 MG) BY MOUTH EVERY 8 HOURS AS NEEDED FOR ANXIETY 60 tablet 3     KETOROLAC TROMETHAMINE PO        ORDER FOR DME Injection Supplies for Vitamin B12: 3cc syringes w/ 27 gauge needles, 1/2 inch length 12 each 0     ORDER FOR DME One touch glucometer with supplies to replace old meter for testing twice daily 1 kit prn     polyethylene glycol (GOLYTELY) 236 g suspension Take 4,000 mLs (4 L) by mouth See Admin Instructions --Drink half gallon (64oz) at 6pm on evening prior to procedure and second half on the morning of procedure (4 hours prior to procedure time) 4 L 0     pravastatin (PRAVACHOL) 80 MG tablet Take 1 tablet (80 mg) by mouth every evening 90 tablet 3     Pseudoephedrine-APAP-DM (DAYQUIL OR)        sildenafil (VIAGRA) 100 MG tablet Take 0.5-1 tablets ( mg) by mouth daily as needed for erectile dysfunction Take 30 min to 4 hours before intercourse.  Never use with nitroglycerin, terazosin or doxazosin. 36 tablet 3     spironolactone (ALDACTONE) 50 MG tablet Take 1 tablet (50 mg) by mouth daily 90 tablet 1     tiZANidine (ZANAFLEX) 4 MG tablet Take 1 tablet (4 mg) by mouth 3 times daily as needed for muscle spasms 30 tablet 1     Allergies   Allergen Reactions     Lisinopril Anaphylaxis     Shellfish Allergy Difficulty breathing     Throat closing up     Shellfish-Derived Products      Throat swells up, hard time breathing      Past Medical History:   Diagnosis Date     Anemia      Asthma      DM (diabetes mellitus) (H)      GERD (gastroesophageal reflux disease)      HTN      Hyperlipidemia      Morbid obesity (H)       Nonsenile cataract      Nuclear sclerosis 5/17/2013     Past Surgical History:   Procedure Laterality Date     ENT SURGERY      tonsils removed at 19 years old     ESOPHAGOSCOPY, GASTROSCOPY, DUODENOSCOPY (EGD), COMBINED N/A 3/18/2015    Procedure: COMBINED ESOPHAGOSCOPY, GASTROSCOPY, DUODENOSCOPY (EGD);  Surgeon: Jae Robles MD;  Location: [facility]     LAPAROSCOPIC BYPASS GASTRIC  6/22/2011    Procedure:LAPAROSCOPIC BYPASS GASTRIC; Surgeon:HANNAH SHEPARD; Location: OR     Family History   Problem Relation Age of Onset     Cerebrovascular Disease Mother      Hypertension Mother      Diabetes Father      Hypertension Father      Heart Disease Brother      Breast Cancer Sister      Cancer Sister      Cerebrovascular Disease Brother      Breast Cancer Sister      Asthma Son      Thyroid Disease No family hx of      Glaucoma No family hx of      Macular Degeneration No family hx of      Social History     Socioeconomic History     Marital status:      Spouse name: None     Number of children: 7     Years of education: 14     Highest education level: None   Occupational History     Occupation:      Employer: [employer] (formerly [employer])   Tobacco Use     Smoking status: Former Smoker     Types: Cigarettes     Smokeless tobacco: Never Used     Tobacco comment: occasional   Substance and Sexual Activity     Alcohol use: Yes     Alcohol/week: 0.0 standard drinks     Comment: Once a week.     Drug use: No     Sexual activity: Yes     Partners: Female     Birth control/protection: None   Other Topics Concern     Parent/sibling w/ CABG, MI or angioplasty before 65F 55M? No      Service No     Blood Transfusions No     Caffeine Concern No     Occupational Exposure No     Hobby Hazards No     Sleep Concern No     Stress Concern No     Weight Concern No     Special Diet No     Back Care No     Exercise Yes     Bike Helmet No     Seat Belt Yes     Self-Exams No   Social History Narrative     None     Social  Determinants of Health     Financial Resource Strain:      Difficulty of Paying Living Expenses:    Food Insecurity:      Worried About Running Out of Food in the Last Year:      Ran Out of Food in the Last Year:    Transportation Needs:      Lack of Transportation (Medical):      Lack of Transportation (Non-Medical):    Physical Activity:      Days of Exercise per Week:      Minutes of Exercise per Session:    Stress:      Feeling of Stress :    Social Connections:      Frequency of Communication with Friends and Family:      Frequency of Social Gatherings with Friends and Family:      Attends Sikhism Services:      Active Member of Clubs or Organizations:      Attends Club or Organization Meetings:      Marital Status:    Intimate Partner Violence:      Fear of Current or Ex-Partner:      Emotionally Abused:      Physically Abused:      Sexually Abused:        REVIEW OF SYSTEMS  =================  C: NEGATIVE for fever, chills, change in weight  I: NEGATIVE for worrisome rashes, moles or lesions  E/M: NEGATIVE for ear, mouth and throat problems  R: NEGATIVE for significant cough or SHORTNESS OF BREATH  CV:  NEGATIVE for chest pain, palpitations or peripheral edema  GI: NEGATIVE for nausea, abdominal pain, heartburn, or change in bowel habits  NEURO: NEGATIVE numbness/weakness  : see HPI  PSYCH: NEGATIVE depression/anxiety  LYmph: no new enlarged lymph nodes  Ortho: no new trauma/movements    Physical Exam:  BP (!) 173/102 (BP Location: Right arm, Patient Position: Sitting, Cuff Size: Adult Large)   Pulse 72   Resp 14   SpO2 100%    Patient is pleasant, in no acute distress, good general condition.  Lung: no evidence of respiratory distress    Abdomen: Soft, nondistended, non tender. No masses. No rebound or guarding.   Exam: normal male  Skin: Warm and dry.  No redness.  Psych: normal mood and affect  Neuro: alert and oriented  Musculaskeletal: moving all extremities    MRI PROSTATE: 3/28/2022 12:31  PM     CLINICAL HISTORY: Prostate cancer, active surveillance; Prostate  cancer (H)     Most Recent PSA: 3.56 ug/L on 3/28/2022     Comparison: None.     TECHNIQUE:  The following sequences were obtained: High-resolution axial  T2-weighted, coronal T2-weighted, 3D volumetric T2-weighted, axial  pre-contrast T1, axial diffusion-weighted, axial apparent diffusion  coefficient and axial dynamic contrast-enhanced T1. Postcontrast  images were evaluated on a separate workstation to evaluate dynamic  contrast enhancement. The technique of this exam is PI-RADS v2.1  compliant. Contrast dose: 15 mL Gadavist     FINDINGS:  Size: 62 grams  Hemorrhage: Absent  Peripheral zone: Heterogeneous on T2-weighted images. Regions of  mildly decreased signal on ADC or DWI which are best characterized as  PI-RADS 2 without highly suspicious lesion.  Transition zone: Enlarged with BPH changes. Transition zone nodules  which are circumscribed or mostly encapsulated without diffusion  restriction.  PI-RADS 2.  No highly suspicious nodules.     Neurovascular bundles: No neurovascular bundle involvement by  malignancy.  Seminal vesicles: No seminal vesicle involvement by malignancy.  Lymph nodes: No lymph node involvement  Bones: No suspicious lesions  Other pelvic organs: No additional findings.                                                                         IMPRESSION:  1. Based on the most suspicious abnormality, this exam is  characterized as PIRADS 2 - Clinically significant cancer is unlikely  to be present.    2. No suspicious adenopathy or evidence of pelvic metastases.        PIRADS? v2.1 Assessment Categories   PIRADS 1: Very low (clinically significant cancer is highly unlikely  to be present)   PIRADS 2: Low (clinically significant cancer is unlikely to be  present)   PIRADS 3: Intermediate (the presence of clinically significant cancer  is equivocal)   PIRADS 4: High (clinically significant cancer is likely to be  present)     PIRADS 5: Very high (clinically significant cancer is highly likely to  be present)    Assessment/Plan:   (C61) Prostate cancer (H)  (primary encounter diagnosis)  Comment:    Plan: psa in six months.    (I10) Hypertension goal BP (blood pressure) < 140/90  Comment:    Plan: Geovany to follow up with Primary Care provider regarding elevated blood pressure.

## 2022-04-16 DIAGNOSIS — I10 HYPERTENSION GOAL BP (BLOOD PRESSURE) < 140/90: ICD-10-CM

## 2022-04-19 RX ORDER — CARVEDILOL 25 MG/1
TABLET ORAL
Qty: 180 TABLET | Refills: 3 | Status: SHIPPED | OUTPATIENT
Start: 2022-04-19 | End: 2022-07-29

## 2022-04-19 NOTE — TELEPHONE ENCOUNTER
Routing refill request to provider for review/approval because:  BP out of range.      Gwen Pruett RN  [facility]

## 2022-04-25 ENCOUNTER — VIRTUAL VISIT (OUTPATIENT)
Dept: FAMILY MEDICINE | Facility: CLINIC | Age: 57
End: 2022-04-25
Payer: COMMERCIAL

## 2022-04-25 DIAGNOSIS — I10 HYPERTENSION GOAL BP (BLOOD PRESSURE) < 140/90: ICD-10-CM

## 2022-04-25 PROCEDURE — 99214 OFFICE O/P EST MOD 30 MIN: CPT | Mod: 95 | Performed by: FAMILY MEDICINE

## 2022-04-25 RX ORDER — SPIRONOLACTONE 100 MG/1
100 TABLET, FILM COATED ORAL DAILY
Qty: 90 TABLET | Refills: 0 | Status: SHIPPED | OUTPATIENT
Start: 2022-04-25 | End: 2022-06-28

## 2022-04-25 NOTE — PROGRESS NOTES
"Geovany is a 56 year old who is being evaluated via a billable video visit.      How would you like to obtain your AVS? MyChart  If the video visit is dropped, the invitation should be resent by: Text to cell phone: [phone]   Will anyone else be joining your video visit? No    Video Start Time: 8:31 AM    Assessment & Plan     Hypertension goal BP (blood pressure) < 140/90  Not controlled - increase spironolactone and increase hydration  - spironolactone (ALDACTONE) 100 MG tablet; Take 1 tablet (100 mg) by mouth daily       BMI:   Estimated body mass index is 43.41 kg/m  as calculated from the following:    Height as of 1/31/22: 1.854 m (6' 1\").    Weight as of 1/31/22: 149.2 kg (329 lb).   Weight management plan: Discussed healthy diet and exercise guidelines    The uses and side effects, including black box warnings as appropriate, were discussed in detail.  All patient questions were answered.  The patient was instructed to call immediately if any side effects developed.     Return in about 2 months (around 7/6/2022) for medication recheck.    Mona Scales MD  [facility] BRENNA Armenta is a 56 year old who presents for the following health issues     HPI     Hypertension Follow-up      Do you check your blood pressure regularly outside of the clinic? Yes     Are you following a low salt diet? Yes    Are your blood pressures ever more than 140 on the top number (systolic) OR more   than 90 on the bottom number (diastolic), for example 140/90? Yes      How many servings of fruits and vegetables do you eat daily?  0-1    On average, how many sweetened beverages do you drink each day (Examples: soda, juice, sweet tea, etc.  Do NOT count diet or artificially sweetened beverages)?   0    How many days per week do you exercise enough to make your heart beat faster? 3 or less    How many minutes a day do you exercise enough to make your heart beat faster? 30 - " 60    How many days per week do you miss taking your medication? 0        Review of Systems   Constitutional, HEENT, cardiovascular, pulmonary, gi and gu systems are negative, except as otherwise noted.      Objective    Vitals - Patient Reported  Systolic (Patient Reported): (!) 140  Diastolic (Patient Reported): 83  Blood pressure taken with manual cuff (will exclude from quality measure): Yes  Pain Score: No Pain (0)        Physical Exam   GENERAL: healthy, alert, no distress and obese  EYES: Eyes grossly normal to inspection.  No discharge or erythema, or obvious scleral/conjunctival abnormalities.  RESP: No audible wheeze, cough, or visible cyanosis.  No visible retractions or increased work of breathing.    SKIN: Visible skin clear. No significant rash, abnormal pigmentation or lesions.  NEURO: Cranial nerves grossly intact.  Mentation and speech appropriate for age.  PSYCH: Mentation appears normal, affect normal/bright, judgement and insight intact, normal speech and appearance well-groomed.                Video-Visit Details    Type of service:  Video Visit    Video End Time:8:41 AM    Originating Location (pt. Location): Home    Distant Location (provider location):  [facility]     Platform used for Video Visit: [employer]

## 2022-06-25 DIAGNOSIS — I10 HYPERTENSION GOAL BP (BLOOD PRESSURE) < 140/90: ICD-10-CM

## 2022-06-25 DIAGNOSIS — E78.5 HYPERLIPIDEMIA LDL GOAL <100: ICD-10-CM

## 2022-06-28 RX ORDER — SPIRONOLACTONE 100 MG/1
100 TABLET, FILM COATED ORAL DAILY
Qty: 90 TABLET | Refills: 0 | Status: SHIPPED | OUTPATIENT
Start: 2022-06-28 | End: 2022-09-06

## 2022-06-28 RX ORDER — CARVEDILOL 25 MG/1
TABLET ORAL
Qty: 180 TABLET | Refills: 3 | OUTPATIENT
Start: 2022-06-28

## 2022-06-28 RX ORDER — AMLODIPINE BESYLATE 10 MG/1
10 TABLET ORAL DAILY
Qty: 90 TABLET | Refills: 1 | Status: SHIPPED | OUTPATIENT
Start: 2022-06-28 | End: 2022-12-08

## 2022-06-28 RX ORDER — PRAVASTATIN SODIUM 80 MG/1
80 TABLET ORAL EVERY EVENING
Qty: 90 TABLET | Refills: 3 | OUTPATIENT
Start: 2022-06-28

## 2022-06-28 NOTE — TELEPHONE ENCOUNTER
BP Readings from Last 3 Encounters:   04/04/22 (!) 173/102   01/31/22 120/78   09/27/21 (!) 171/95

## 2022-07-06 ENCOUNTER — OFFICE VISIT (OUTPATIENT)
Dept: FAMILY MEDICINE | Facility: CLINIC | Age: 57
End: 2022-07-06
Payer: COMMERCIAL

## 2022-07-06 VITALS
DIASTOLIC BLOOD PRESSURE: 78 MMHG | SYSTOLIC BLOOD PRESSURE: 136 MMHG | RESPIRATION RATE: 13 BRPM | BODY MASS INDEX: 44.12 KG/M2 | WEIGHT: 315 LBS | TEMPERATURE: 97.9 F | HEART RATE: 53 BPM | OXYGEN SATURATION: 99 %

## 2022-07-06 DIAGNOSIS — N64.4 NIPPLE PAIN: Primary | ICD-10-CM

## 2022-07-06 DIAGNOSIS — Z23 NEED FOR PROPHYLACTIC VACCINATION AGAINST STREPTOCOCCUS PNEUMONIAE (PNEUMOCOCCUS): ICD-10-CM

## 2022-07-06 DIAGNOSIS — E11.65 TYPE 2 DIABETES MELLITUS WITH HYPERGLYCEMIA, WITHOUT LONG-TERM CURRENT USE OF INSULIN (H): ICD-10-CM

## 2022-07-06 DIAGNOSIS — I10 HYPERTENSION GOAL BP (BLOOD PRESSURE) < 140/90: ICD-10-CM

## 2022-07-06 DIAGNOSIS — E78.5 HYPERLIPIDEMIA LDL GOAL <100: ICD-10-CM

## 2022-07-06 LAB
ALBUMIN SERPL-MCNC: 3.9 G/DL (ref 3.4–5)
ALP SERPL-CCNC: 81 U/L (ref 40–150)
ALT SERPL W P-5'-P-CCNC: 30 U/L (ref 0–70)
ANION GAP SERPL CALCULATED.3IONS-SCNC: 6 MMOL/L (ref 3–14)
AST SERPL W P-5'-P-CCNC: 15 U/L (ref 0–45)
BASOPHILS # BLD AUTO: 0 10E3/UL (ref 0–0.2)
BASOPHILS NFR BLD AUTO: 0 %
BILIRUB SERPL-MCNC: 0.7 MG/DL (ref 0.2–1.3)
BUN SERPL-MCNC: 10 MG/DL (ref 7–30)
CALCIUM SERPL-MCNC: 9.6 MG/DL (ref 8.5–10.1)
CHLORIDE BLD-SCNC: 103 MMOL/L (ref 94–109)
CO2 SERPL-SCNC: 26 MMOL/L (ref 20–32)
CREAT SERPL-MCNC: 0.85 MG/DL (ref 0.66–1.25)
EOSINOPHIL # BLD AUTO: 0.2 10E3/UL (ref 0–0.7)
EOSINOPHIL NFR BLD AUTO: 2 %
ERYTHROCYTE [DISTWIDTH] IN BLOOD BY AUTOMATED COUNT: 13.8 % (ref 10–15)
GFR SERPL CREATININE-BSD FRML MDRD: >90 ML/MIN/1.73M2
GLUCOSE BLD-MCNC: 189 MG/DL (ref 70–99)
HBA1C MFR BLD: 7.9 % (ref 0–5.6)
HCT VFR BLD AUTO: 41.7 % (ref 40–53)
HGB BLD-MCNC: 13.2 G/DL (ref 13.3–17.7)
IMM GRANULOCYTES # BLD: 0 10E3/UL
IMM GRANULOCYTES NFR BLD: 0 %
LYMPHOCYTES # BLD AUTO: 2.8 10E3/UL (ref 0.8–5.3)
LYMPHOCYTES NFR BLD AUTO: 28 %
MCH RBC QN AUTO: 25.7 PG (ref 26.5–33)
MCHC RBC AUTO-ENTMCNC: 31.7 G/DL (ref 31.5–36.5)
MCV RBC AUTO: 81 FL (ref 78–100)
MONOCYTES # BLD AUTO: 0.7 10E3/UL (ref 0–1.3)
MONOCYTES NFR BLD AUTO: 7 %
NEUTROPHILS # BLD AUTO: 6.5 10E3/UL (ref 1.6–8.3)
NEUTROPHILS NFR BLD AUTO: 63 %
PLATELET # BLD AUTO: 298 10E3/UL (ref 150–450)
POTASSIUM BLD-SCNC: 4.3 MMOL/L (ref 3.4–5.3)
PROT SERPL-MCNC: 8.3 G/DL (ref 6.8–8.8)
RBC # BLD AUTO: 5.14 10E6/UL (ref 4.4–5.9)
SODIUM SERPL-SCNC: 135 MMOL/L (ref 133–144)
VIT B12 SERPL-MCNC: 404 PG/ML (ref 232–1245)
WBC # BLD AUTO: 10.3 10E3/UL (ref 4–11)

## 2022-07-06 PROCEDURE — 90677 PCV20 VACCINE IM: CPT | Performed by: FAMILY MEDICINE

## 2022-07-06 PROCEDURE — 82607 VITAMIN B-12: CPT | Performed by: FAMILY MEDICINE

## 2022-07-06 PROCEDURE — 85025 COMPLETE CBC W/AUTO DIFF WBC: CPT | Performed by: FAMILY MEDICINE

## 2022-07-06 PROCEDURE — 36415 COLL VENOUS BLD VENIPUNCTURE: CPT | Performed by: FAMILY MEDICINE

## 2022-07-06 PROCEDURE — 83036 HEMOGLOBIN GLYCOSYLATED A1C: CPT | Performed by: FAMILY MEDICINE

## 2022-07-06 PROCEDURE — G0009 ADMIN PNEUMOCOCCAL VACCINE: HCPCS | Performed by: FAMILY MEDICINE

## 2022-07-06 PROCEDURE — 99214 OFFICE O/P EST MOD 30 MIN: CPT | Mod: 25 | Performed by: FAMILY MEDICINE

## 2022-07-06 PROCEDURE — 80053 COMPREHEN METABOLIC PANEL: CPT | Performed by: FAMILY MEDICINE

## 2022-07-06 ASSESSMENT — ASTHMA QUESTIONNAIRES: ACT_TOTALSCORE: 25

## 2022-07-06 ASSESSMENT — PAIN SCALES - GENERAL: PAINLEVEL: NO PAIN (0)

## 2022-07-06 NOTE — NURSING NOTE
Prior to immunization administration, verified patients identity using patient s name and date of birth. Please see Immunization Activity for additional information.     Screening Questionnaire for Adult Immunization    Are you sick today?   No   Do you have allergies to medications, food, a vaccine component or latex?   No   Have you ever had a serious reaction after receiving a vaccination?   No   Do you have a long-term health problem with heart, lung, kidney, or metabolic disease (e.g., diabetes), asthma, a blood disorder, no spleen, complement component deficiency, a cochlear implant, or a spinal fluid leak?  Are you on long-term aspirin therapy?   No   Do you have cancer, leukemia, HIV/AIDS, or any other immune system problem?   No   Do you have a parent, brother, or sister with an immune system problem?   No   In the past 3 months, have you taken medications that affect  your immune system, such as prednisone, other steroids, or anticancer drugs; drugs for the treatment of rheumatoid arthritis, Crohn s disease, or psoriasis; or have you had radiation treatments?   No   Have you had a seizure, or a brain or other nervous system problem?   No   During the past year, have you received a transfusion of blood or blood    products, or been given immune (gamma) globulin or antiviral drug?   No   For women: Are you pregnant or is there a chance you could become       pregnant during the next month?   No   Have you received any vaccinations in the past 4 weeks?   No     Immunization questionnaire answers were all negative.        Per orders of Dr. Tiffanie Scales, injection of PCV20 given by Migdalia Lozano RN. Patient instructed to remain in clinic for 15 minutes afterwards, and to report any adverse reaction to me immediately.       Screening performed by Migdalia Lozano RN on 7/6/2022 at 11:37 AM.

## 2022-07-06 NOTE — PROGRESS NOTES
Assessment & Plan     Nipple pain  Uncertain etiology - check labs and stop new OTC supplements.  Consider further testing is not resolved.  - Vitamin B12; Future  - CBC with platelets and differential; Future    Type 2 diabetes mellitus with hyperglycemia, without long-term current use of insulin (H)  Uncertain control - check lab and patient to schedule eye exam  - OPTOMETRY REFERRAL; Future  - Hemoglobin A1c; Future  - Comprehensive metabolic panel (BMP + Alb, Alk Phos, ALT, AST, Total. Bili, TP); Future    Hyperlipidemia LDL goal <100  Stable - check labs  - Comprehensive metabolic panel (BMP + Alb, Alk Phos, ALT, AST, Total. Bili, TP); Future    Hypertension goal BP (blood pressure) < 140/90  Controlled - check lab  - Comprehensive metabolic panel (BMP + Alb, Alk Phos, ALT, AST, Total. Bili, TP); Future    Need for prophylactic vaccination against Streptococcus pneumoniae (pneumococcus)    - Pneumococcal 20 Valent Conjugate (Prevnar 20)       Tobacco Cessation:   reports that he has been smoking cigars. He has a 3.00 pack-year smoking history. He has never used smokeless tobacco.  Tobacco Cessation Action Plan: Information offered: Patient not interested at this time    The uses and side effects, including black box warnings as appropriate, were discussed in detail.  All patient questions were answered.  The patient was instructed to call immediately if any side effects developed.     Return in about 6 months (around 1/6/2023) for medication recheck.    Mona Scales MD  [facility]    Khris Armenta is a 56 year old, presenting for the following health issues:  Diabetes (Diabetes follow up )      History of Present Illness       Diabetes:   He presents for follow up of diabetes.  He is checking home blood glucose a few times a week. He checks blood glucose before meals.  Blood glucose is never over 200 and never under 70. He is aware of hypoglycemia symptoms  including shakiness. He has no concerns regarding his diabetes at this time.  He is not experiencing numbness or burning in feet, excessive thirst, blurry vision, weight changes or redness, sores or blisters on feet. The patient has not had a diabetic eye exam in the last 12 months.         Hypertension: He presents for follow up of hypertension.  He does check blood pressure  regularly outside of the clinic. Outside blood pressures have been over 140/90. He follows a low salt diet.         Asthma Follow-Up    Was ACT completed today?    Yes    ACT Total Scores 7/6/2022   ACT TOTAL SCORE -   ASTHMA ER VISITS -   ASTHMA HOSPITALIZATIONS -   ACT TOTAL SCORE (Goal Greater than or Equal to 20) 25   In the past 12 months, how many times did you visit the emergency room for your asthma without being admitted to the hospital? 0   In the past 12 months, how many times were you hospitalized overnight because of your asthma? 0          How many days per week do you miss taking your asthma controller medication?  I do not have an asthma controller medication    Please describe any recent triggers for your asthma: None    Have you had any Emergency Room Visits, Urgent Care Visits, or Hospital Admissions since your last office visit?  No       Review of Systems   Constitutional, HEENT, cardiovascular, pulmonary, gi and gu systems are negative, except as otherwise noted.      Objective    /78   Pulse 53   Temp 97.9  F (36.6  C) (Tympanic)   Resp 13   Wt (!) 151.7 kg (334 lb 6.4 oz)   SpO2 99%   BMI 44.12 kg/m    Body mass index is 44.12 kg/m .  Physical Exam   GENERAL: healthy, alert, no distress and obese  RESP: lungs clear to auscultation - no rales, rhonchi or wheezes  CV: regular rate and rhythm, normal S1 S2, no S3 or S4, no murmur, click or rub, no peripheral edema and peripheral pulses strong  PSYCH: mentation appears normal, affect normal/bright              .  ..

## 2022-07-07 ENCOUNTER — MYC MEDICAL ADVICE (OUTPATIENT)
Dept: FAMILY MEDICINE | Facility: CLINIC | Age: 57
End: 2022-07-07

## 2022-07-07 NOTE — RESULT ENCOUNTER NOTE
Mr. Marte,    Your vitamin B12 level is three times the normal limit. Please stop any supplements you are taking other than your multivitamin as we discussed at your visit.  Your blood sugar is also up and your diabetes has worsened.  Please decrease your sugar and carbohydrate intake as we discussed.    Follow up in 6 months.      Please contact the clinic if you have additional questions.  Thank you.    Sincerely,    Mona Scales MD

## 2022-07-08 NOTE — TELEPHONE ENCOUNTER
Reviewed lab results, it appears the vitamin b12 level drawn that is 3 times the normal limit was taken 4 years ago, most recent vitamin b12 level was 404. Please advise.  Lisa Sin RN  [facility]

## 2022-07-20 ENCOUNTER — OFFICE VISIT (OUTPATIENT)
Dept: OPTOMETRY | Facility: CLINIC | Age: 57
End: 2022-07-20
Payer: COMMERCIAL

## 2022-07-20 DIAGNOSIS — H52.03 HYPERMETROPIA OF BOTH EYES: ICD-10-CM

## 2022-07-20 DIAGNOSIS — E11.9 TYPE 2 DIABETES MELLITUS WITHOUT COMPLICATION, WITHOUT LONG-TERM CURRENT USE OF INSULIN (H): Primary | ICD-10-CM

## 2022-07-20 DIAGNOSIS — H25.13 NUCLEAR SCLEROSIS OF BOTH EYES: ICD-10-CM

## 2022-07-20 DIAGNOSIS — H40.003 GLAUCOMA SUSPECT OF BOTH EYES: ICD-10-CM

## 2022-07-20 DIAGNOSIS — H10.13 ALLERGIC CONJUNCTIVITIS OF BOTH EYES: ICD-10-CM

## 2022-07-20 DIAGNOSIS — H52.4 PRESBYOPIA: ICD-10-CM

## 2022-07-20 PROCEDURE — 92015 DETERMINE REFRACTIVE STATE: CPT | Performed by: OPTOMETRIST

## 2022-07-20 PROCEDURE — 92014 COMPRE OPH EXAM EST PT 1/>: CPT | Performed by: OPTOMETRIST

## 2022-07-20 RX ORDER — AZELASTINE HYDROCHLORIDE 0.5 MG/ML
1 SOLUTION/ DROPS OPHTHALMIC 2 TIMES DAILY
Qty: 6 ML | Refills: 11 | Status: SHIPPED | OUTPATIENT
Start: 2022-07-20 | End: 2023-07-20

## 2022-07-20 ASSESSMENT — REFRACTION_WEARINGRX
OS_ADD: +2.25
OD_ADD: +2.25
OS_CYLINDER: +0.25
OD_SPHERE: +1.25
OS_AXIS: 045
SPECS_TYPE: OTC READERS DIDNT BRING
OS_SPHERE: +0.75

## 2022-07-20 ASSESSMENT — CONF VISUAL FIELD
OS_NORMAL: 1
OD_NORMAL: 1

## 2022-07-20 ASSESSMENT — REFRACTION_MANIFEST
OS_ADD: +2.25
OD_CYLINDER: +0.25
OS_SPHERE: +1.25
OS_AXIS: 045
OD_ADD: +2.25
OD_AXIS: 167
OD_SPHERE: +1.75
OS_CYLINDER: +0.25

## 2022-07-20 ASSESSMENT — TONOMETRY
IOP_METHOD: TONOPEN
OS_IOP_MMHG: 17
OD_IOP_MMHG: 17

## 2022-07-20 ASSESSMENT — VISUAL ACUITY
OD_SC: 20/70
OS_SC: 20/40
OS_SC: 20/70
OD_SC: 20/40
METHOD: SNELLEN - LINEAR

## 2022-07-20 ASSESSMENT — KERATOMETRY
OS_K2POWER_DIOPTERS: 42.75
OS_AXISANGLE2_DEGREES: 155
OD_K1POWER_DIOPTERS: 41.75
OS_K1POWER_DIOPTERS: 42.25
OD_AXISANGLE2_DEGREES: 013
OS_AXISANGLE_DEGREES: 065
OD_AXISANGLE_DEGREES: 103
OD_K2POWER_DIOPTERS: 42.00

## 2022-07-20 ASSESSMENT — SLIT LAMP EXAM - LIDS
COMMENTS: NORMAL
COMMENTS: NORMAL

## 2022-07-20 ASSESSMENT — EXTERNAL EXAM - RIGHT EYE: OD_EXAM: NORMAL

## 2022-07-20 ASSESSMENT — EXTERNAL EXAM - LEFT EYE: OS_EXAM: NORMAL

## 2022-07-20 NOTE — PROGRESS NOTES
Chief Complaint   Patient presents with     Diabetic Eye Exam     Accompanied by self  Chief Complaint(s) and History of Present Illness(es)     Diabetic Eye Exam     Vision: is blurred for near and is blurred for distance    Diabetes Type: Type 2 and controlled with diet    Duration: 10 years    Blood Sugars: is controlled               Lab Results   Component Value Date    A1C 7.9 07/06/2022    A1C 7.5 01/31/2022    A1C 7.0 07/09/2021    A1C 8.1 01/11/2021    A1C 7.1 09/28/2020    A1C 6.2 04/16/2020    A1C 6.3 10/30/2019            Last Eye Exam: 7-  Dilated Previously: Yes    What are you currently using to see?  otc readers    Distance Vision Acuity: Noticed gradual change in both eyes    Near Vision Acuity: Not satisfied     Eye Comfort: good  Do you use eye drops? : No  Occupation or Hobbies:     Followed by Dr Louie as a glaucoma suspect- last visit was 9/10/2020  Geovany Marte is a 54 year old male who presents with:     Glaucoma suspect of both eyes Last testing 10/2017. Intraocular pressure 18/16 today with thinner pachymetry (530/520).      OCT optic nerve: avg retinal nerve fiber layer 96/93. Mild thinning of the superior quadrant in the left eye - watch closely.      Logan visual field (HVF) 24-2: within normal limits right eye; ?start of superior nasal step left eye (doesn't correspond with OCT).     Monitor closely.         Plan:  Continue monitoring for glaucoma suspicion     Return in 1 year for repeat testing      Birgit Vargas Optometric Assistant, A.B.O.C.     Medical, surgical and family histories reviewed and updated 7/20/2022.       OBJECTIVE: See Ophthalmology exam    ASSESSMENT:    ICD-10-CM    1. Type 2 diabetes mellitus without complication, without long-term current use of insulin (H)  E11.9 EYE EXAM (SIMPLE-NONBILLABLE)    Negtive diabetic retinopathy   2. Nuclear sclerosis of both eyes  H25.13 EYE EXAM (SIMPLE-NONBILLABLE)   3. Allergic conjunctivitis of both eyes   H10.13 EYE EXAM (SIMPLE-NONBILLABLE)     azelastine (OPTIVAR) 0.05 % ophthalmic solution   4. Glaucoma suspect of both eyes  H40.003 EYE EXAM (SIMPLE-NONBILLABLE)    Large C/Ds  IOPs within normal limits both eyes  Thin corneas  Last OCT/VF- 9/2020 with Dr. Louie   5. Hypermetropia of both eyes  H52.03 REFRACTION   6. Presbyopia  H52.4 REFRACTION       PLAN:    Geovany Marte aware  eye exam results will be sent to [facility].  Patient Instructions   There are not any signs of the diabetes affecting the eyes today.  It is important that you get your eyes dilated once yearly and keep good control of your diabetes.    You have the start of mild cataracts.  You may notice some blurred vision or glare with night driving.  It is important that you wear good sunglasses to protect your eyes from the ultraviolet light from the sun.  I recommend that you return in 1 year for an eye exam unless there are any sudden changes in your vision.       Optivar- 1 drop both eyes 2 x day as needed for itchy eyes.    Schedule follow up glaucoma testing with Dr. Louie at [facility].    Eyeglass prescription given.    Return in 1 year for a complete eye exam or sooner if needed.    Yoseph Ortiz, OD

## 2022-07-20 NOTE — Clinical Note
Here is Steffanie's - he also needs an appointment with Dr. Louie for glaucoma OCT/VF.  Thank you!  Yoseph Ortiz OD

## 2022-07-20 NOTE — PATIENT INSTRUCTIONS
There are not any signs of the diabetes affecting the eyes today.  It is important that you get your eyes dilated once yearly and keep good control of your diabetes.    You have the start of mild cataracts.  You may notice some blurred vision or glare with night driving.  It is important that you wear good sunglasses to protect your eyes from the ultraviolet light from the sun.  I recommend that you return in 1 year for an eye exam unless there are any sudden changes in your vision.       Optivar- 1 drop both eyes 2 x day as needed for itchy eyes.    Schedule follow up glaucoma testing with Dr. Louie at [facility].    Eyeglass prescription given.    Return in 1 year for a complete eye exam or sooner if needed.    Yoseph Ortiz, OD    The affects of the dilating drops last for 4- 6 hours.  You will be more sensitive to light and vision will be blurry up close.  Do not drive if you do not feel comfortable.  Mydriatic sunglasses were given if needed.    Patient Education   Diabetes weakens the blood vessels all over the body, including the eyes. Damage to the blood vessels in the eyes can cause swelling or bleeding into part of the eye (called the retina). This is called diabetic retinopathy (OLGA-tin-AH-puh-thee). If not treated, this disease can cause vision loss or blindness.   Symptoms may include blurred or distorted vision, but many people have no symptoms. It's important to see your eye doctor regularly to check for problems.   Early treatment and good control can help protect your vision. Here are the things you can do to help prevent vision loss:      1. Keep your blood sugar levels under tight control.      2. Bring high blood pressure under control.      3. No smoking.      4. Have yearly dilated eye exams.       Optometry Providers       Clinic Locations                                 Telephone Number   Dr. Marleny Ashford     [place]   [facility] [place] [phone]     [facility] Hours:                [place] [facility] Hours:       [place] [facility] Hours:   [address]   [address]     [address]  [place] [address]   [place] [address]    [place] [address]  Phone: [phone]                    Phone: [phone]     Phone: [phone]                      Monday 8:00-6:00                          Monday 8:00-6:00                          Monday 8:00-6:00              Tuesday 8:00-6:00                          Tuesday 8:00-6:00                          Tuesday 8:00-6:00              Wednesday 8:00-6:00                  Wednesday 8:00-6:00                   Wednesday 8:00-6:00      Thursday 8:00-6:00                        Thursday 8:00-6:00                         Thursday 8:00-6:00            Friday 8:00-5:00                              Friday 8:00-5:00                              Friday 8:00-5:00    [facility] Hours:   [address] [place] [address]  [phone]    Monday 9:00-6:00  Tuesday 9:00-6:00  Wednesday 9:00-6:00  Thursday 9:00-6:00  Friday 9:00-5:00  Please log on to [email or web address] to order your contact lenses.  The link is found on the [facility] page.  As always, Thank you for trusting us with your health care needs!

## 2022-07-20 NOTE — LETTER
7/20/2022         RE: Geovany Marte  [address]  [facility] [address]        Dear Colleague,    Thank you for referring your patient, Geovany Marte, to the [facility]. Please see a copy of my visit note below.    Chief Complaint   Patient presents with     Diabetic Eye Exam     Accompanied by self  Chief Complaint(s) and History of Present Illness(es)     Diabetic Eye Exam     Vision: is blurred for near and is blurred for distance    Diabetes Type: Type 2 and controlled with diet    Duration: 10 years    Blood Sugars: is controlled               Lab Results   Component Value Date    A1C 7.9 07/06/2022    A1C 7.5 01/31/2022    A1C 7.0 07/09/2021    A1C 8.1 01/11/2021    A1C 7.1 09/28/2020    A1C 6.2 04/16/2020    A1C 6.3 10/30/2019            Last Eye Exam: 7-  Dilated Previously: Yes    What are you currently using to see?  otc readers    Distance Vision Acuity: Noticed gradual change in both eyes    Near Vision Acuity: Not satisfied     Eye Comfort: good  Do you use eye drops? : No  Occupation or Hobbies:     Followed by Dr Louie as a glaucoma suspect- last visit was 9/10/2020  Geovany Marte is a 54 year old male who presents with:     Glaucoma suspect of both eyes Last testing 10/2017. Intraocular pressure 18/16 today with thinner pachymetry (530/520).      OCT optic nerve: avg retinal nerve fiber layer 96/93. Mild thinning of the superior quadrant in the left eye - watch closely.      Logan visual field (HVF) 24-2: within normal limits right eye; ?start of superior nasal step left eye (doesn't correspond with OCT).     Monitor closely.         Plan:  Continue monitoring for glaucoma suspicion     Return in 1 year for repeat testing      Birgit Vargas Optometric Assistant, A.B.O.C.     Medical, surgical and family histories reviewed and updated 7/20/2022.       OBJECTIVE: See Ophthalmology exam    ASSESSMENT:    ICD-10-CM    1. Type 2 diabetes mellitus  without complication, without long-term current use of insulin (H)  E11.9 EYE EXAM (SIMPLE-NONBILLABLE)    Negtive diabetic retinopathy   2. Nuclear sclerosis of both eyes  H25.13 EYE EXAM (SIMPLE-NONBILLABLE)   3. Allergic conjunctivitis of both eyes  H10.13 EYE EXAM (SIMPLE-NONBILLABLE)     azelastine (OPTIVAR) 0.05 % ophthalmic solution   4. Glaucoma suspect of both eyes  H40.003 EYE EXAM (SIMPLE-NONBILLABLE)    Large C/Ds  IOPs within normal limits both eyes  Thin corneas  Last OCT/VF- 9/2020 with Dr. Louie   5. Hypermetropia of both eyes  H52.03 REFRACTION   6. Presbyopia  H52.4 REFRACTION       PLAN:    Geovany Marte aware  eye exam results will be sent to [facility].  Patient Instructions   There are not any signs of the diabetes affecting the eyes today.  It is important that you get your eyes dilated once yearly and keep good control of your diabetes.    You have the start of mild cataracts.  You may notice some blurred vision or glare with night driving.  It is important that you wear good sunglasses to protect your eyes from the ultraviolet light from the sun.  I recommend that you return in 1 year for an eye exam unless there are any sudden changes in your vision.       Optivar- 1 drop both eyes 2 x day as needed for itchy eyes.    Schedule follow up glaucoma testing with Dr. Louie at [facility].    Eyeglass prescription given.    Return in 1 year for a complete eye exam or sooner if needed.    Yoseph Ortiz, SHIRA                   Again, thank you for allowing me to participate in the care of your patient.        Sincerely,        Yoseph Ortiz, OD

## 2022-07-29 ENCOUNTER — MYC MEDICAL ADVICE (OUTPATIENT)
Dept: FAMILY MEDICINE | Facility: CLINIC | Age: 57
End: 2022-07-29

## 2022-08-05 NOTE — PATIENT INSTRUCTIONS
Continue monitoring for glaucoma suspicion    Return in 1 year for repeat testing    Jason Louie MD  [phone]     Never

## 2022-09-27 ENCOUNTER — LAB (OUTPATIENT)
Dept: LAB | Facility: CLINIC | Age: 57
End: 2022-09-27
Payer: COMMERCIAL

## 2022-09-27 DIAGNOSIS — C61 PROSTATE CANCER (H): ICD-10-CM

## 2022-09-27 DIAGNOSIS — E11.65 TYPE 2 DIABETES MELLITUS WITH HYPERGLYCEMIA, WITHOUT LONG-TERM CURRENT USE OF INSULIN (H): ICD-10-CM

## 2022-09-27 LAB — PSA SERPL-MCNC: 4.15 UG/L (ref 0–4)

## 2022-09-27 PROCEDURE — 36415 COLL VENOUS BLD VENIPUNCTURE: CPT

## 2022-09-27 PROCEDURE — 84153 ASSAY OF PSA TOTAL: CPT

## 2022-10-04 ENCOUNTER — OFFICE VISIT (OUTPATIENT)
Dept: UROLOGY | Facility: CLINIC | Age: 57
End: 2022-10-04
Payer: COMMERCIAL

## 2022-10-04 VITALS — OXYGEN SATURATION: 100 % | HEART RATE: 73 BPM

## 2022-10-04 DIAGNOSIS — C61 PROSTATE CANCER (H): Primary | ICD-10-CM

## 2022-10-04 PROCEDURE — 99213 OFFICE O/P EST LOW 20 MIN: CPT | Performed by: UROLOGY

## 2022-10-04 ASSESSMENT — PAIN SCALES - GENERAL: PAINLEVEL: NO PAIN (0)

## 2022-10-04 NOTE — PROGRESS NOTES
Chief Complaint   Patient presents with     follow up PSA       Geovany Marte is a 56 year old male who presents today for follow up of   Chief Complaint   Patient presents with     follow up PSA       f/u for prostate cancer group 1 under active observation.  He is without any complaints.  Recent psa was 4.15.   Recent prostate MRI showed PIRADS 2 nodule only.  Current Outpatient Medications   Medication Sig Dispense Refill     ACCU-CHEK GUIDE test strip USE TO TEST BLOOD SUGAR 1 TIME DAILY OR AS DIRECTED 100 strip 2     ACE/ARB NOT PRESCRIBED, INTENTIONAL, continuous prn for other Reported on 4/3/2017       albuterol (PROAIR HFA/PROVENTIL HFA/VENTOLIN HFA) 108 (90 Base) MCG/ACT inhaler INHALE 2 PUFFS INTO THE LUNGS EVERY 4 HOURS AS NEEDED (FOR ASTHMA SYMPTOMS) 18 g 5     amLODIPine (NORVASC) 10 MG tablet Take 1 tablet (10 mg) by mouth daily 90 tablet 1     ASPIRIN 81 MG PO TABS 1 TABLET DAILY (*)       azelastine (OPTIVAR) 0.05 % ophthalmic solution Apply 1 drop to eye 2 times daily 6 mL 11     blood glucose (NO BRAND SPECIFIED) lancets standard Use to test blood sugar 1 times daily or as directed. 100 each 3     blood glucose (NO BRAND SPECIFIED) test strip Use to test blood sugar 1 times daily or as directed. 100 each 3     Blood Glucose Monitoring Suppl (ACCU-CHEK GUIDE ME) w/Device KIT USE TO TEST BLOOD SUGAR 1 TIMES DAILY OR AS DIRECTED 1 kit 0     Blood Pressure Monitoring (BLOOD PRESSURE CUFF) MISC Use to check blood pressure 3 - 4 times per week. 1 each 0     Calcium-Magnesium-Vitamin D (CITRACAL CALCIUM+D PO) 3 times daily Take one tablet twice daily.       carvedilol (COREG) 25 MG tablet TAKE 1 TABLET BY MOUTH TWICE A DAY WITH MEALS 180 tablet 0     EPINEPHrine (AUVI-Q) 0.3 MG/0.3ML injection 2-pack Inject 0.3 mLs (0.3 mg) into the muscle as needed for anaphylaxis 4 mL 1     fexofenadine-pseudoePHEDrine (ALLEGRA-D 24) 180-240 MG 24 hr tablet Take 1 tablet by mouth daily 90 tablet 1     fluticasone  (FLONASE) 50 MCG/ACT nasal spray SPRAY 1-2 SPRAYS INTO BOTH NOSTRILS DAILY 48 mL 2     ORDER FOR DME Injection Supplies for Vitamin B12: 3cc syringes w/ 27 gauge needles, 1/2 inch length 12 each 0     ORDER FOR DME One touch glucometer with supplies to replace old meter for testing twice daily 1 kit prn     pravastatin (PRAVACHOL) 80 MG tablet Take 1 tablet (80 mg) by mouth every evening 90 tablet 1     Pseudoephedrine-APAP-DM (DAYQUIL OR)        spironolactone (ALDACTONE) 100 MG tablet TAKE 1 TABLET BY MOUTH  DAILY 90 tablet 0     Allergies   Allergen Reactions     Lisinopril Anaphylaxis     Shellfish Allergy Difficulty breathing     Throat closing up     Shellfish-Derived Products      Throat swells up, hard time breathing      Past Medical History:   Diagnosis Date     Anemia      Asthma      DM (diabetes mellitus) (H)      GERD (gastroesophageal reflux disease)      HTN      Hyperlipidemia      Morbid obesity (H)      Nonsenile cataract      Nuclear sclerosis 5/17/2013     Past Surgical History:   Procedure Laterality Date     ENT SURGERY      tonsils removed at 19 years old     ESOPHAGOSCOPY, GASTROSCOPY, DUODENOSCOPY (EGD), COMBINED N/A 3/18/2015    Procedure: COMBINED ESOPHAGOSCOPY, GASTROSCOPY, DUODENOSCOPY (EGD);  Surgeon: Jae Robles MD;  Location: [facility]     LAPAROSCOPIC BYPASS GASTRIC  6/22/2011    Procedure:LAPAROSCOPIC BYPASS GASTRIC; Surgeon:HANNAH SHEPARD; Location: OR     Family History   Problem Relation Age of Onset     Cerebrovascular Disease Mother      Hypertension Mother      Diabetes Father      Hypertension Father      Heart Disease Brother      Breast Cancer Sister      Cancer Sister      Cerebrovascular Disease Brother      Breast Cancer Sister      Asthma Son      Thyroid Disease No family hx of      Glaucoma No family hx of      Macular Degeneration No family hx of      Social History     Socioeconomic History     Marital status:      Spouse name: None     Number of  children: 7     Years of education: 14     Highest education level: None   Occupational History     Occupation:      Employer: [employer]   Tobacco Use     Smoking status: Current Some Day Smoker     Packs/day: 0.10     Years: 30.00     Pack years: 3.00     Types: Cigars     Smokeless tobacco: Never Used     Tobacco comment: occasional cigar (roughly once every two months)   Substance and Sexual Activity     Alcohol use: Yes     Alcohol/week: 0.0 standard drinks     Comment: Once a week.     Drug use: No     Sexual activity: Yes     Partners: Female     Birth control/protection: None   Other Topics Concern     Parent/sibling w/ CABG, MI or angioplasty before 65F 55M? No      Service No     Blood Transfusions No     Caffeine Concern No     Occupational Exposure No     Hobby Hazards No     Sleep Concern No     Stress Concern No     Weight Concern No     Special Diet No     Back Care No     Exercise Yes     Bike Helmet No     Seat Belt Yes     Self-Exams No       REVIEW OF SYSTEMS  =================  C: NEGATIVE for fever, chills, change in weight  I: NEGATIVE for worrisome rashes, moles or lesions  E/M: NEGATIVE for ear, mouth and throat problems  R: NEGATIVE for significant cough or SHORTNESS OF BREATH  CV:  NEGATIVE for chest pain, palpitations or peripheral edema  GI: NEGATIVE for nausea, abdominal pain, heartburn, or change in bowel habits  NEURO: NEGATIVE numbness/weakness  : see HPI  PSYCH: NEGATIVE depression/anxiety  LYmph: no new enlarged lymph nodes  Ortho: no new trauma/movements    Physical Exam:  Pulse 73, SpO2 100 %.    GENERAL: healthy, alert and no distress  EYES: Eyes grossly normal to inspection, conjunctivae and sclerae normal  RESP: no audible wheeze, cough, or visible cyanosis.  No visible retractions or increased work of breathing.  Able to speak fully in complete sentences.  NEURO: Cranial nerves grossly intact, mentation intact and speech normal  PSYCH: mentation appears normal,  affect normal/bright, judgement and insight intact, normal speech and appearance well-groomed    Assessment/Plan:   (C61) Prostate cancer (H)  (primary encounter diagnosis)  Comment:    Plan: PSA tumor marker in six months           Schedule for trus and biopsy in six months.

## 2022-10-04 NOTE — PATIENT INSTRUCTIONS
[employer] [facility] is for any [place] looking for free support to quit smoking, vaping or chewing.   [facility] [facility] will offer many quit support options and resources so [place] residents can continue to find the way to quit that works best for them.   Free support includes personalized coaching, email and text support, educational materials, and quit medication (nicotine patches, gum or lozenges) delivered by mail.     Contact [facility] [facility] at [phone] FreaksQUITNoise FreaksNOW or online at [email or web address] to receive support on your quit journe  Prostate Ultrasound Instructions  Dr. Ivy  [address] [facility] [address]  [phone]      Appointment Date: 4/4/22   Time: 8:00 arrival for 8:30 procedure    Take antibiotics as directed on label. START THE DAY OF THE BIOPSY   1 Fleets enema to be done 1    - 2 hours before procedure   NOTHING BY MOUTH AFTER THE ENEMA   If you are taking blood thinners (Aspirin, ibuprofen, Aleve) this is to be stopped ONE WEEK before the procedure. Tylenol is ok. If you are taking Coumadin, you must check with your primary doctor for further instructions  Please follow up in the office with the doctor ONE WEEK after the procedure to go over results      Follow up appointment:  Date: 4/11/22 Time:  8:30  PROSTATE BIOPSY  Patient information:  You have had an examination of the prostate gland called a LUIZA (digital rectal examination) where a finger was inserted into your rectum to enable the doctor to feel your prostate gland. You may also have had a PSA (prostate specific antigen) blood test.  Sometimes an elevated PSA level and an abnormal LUIZA can be signs of prostate cancer. The doctor has recommended that you have a prostate biopsy.  What is a prostate biopsy:  You will be positioned for the biopsy as preferred by the physician.  An ultrasound probe is inserted into the rectum and a needle is then passed through the wall of the rectum and an injection of local anesthetic is given. Following the  injection of local anesthetic, a needle will be inserted into the prostate gland to take a sample of cells. The doctor will usually take 12 separate biopsies.  You can hear some clicking sounds from the biopsy instrument whilst the biopsies are taken.  The procedure will take approximately five to ten minutes.   What are the risks?  Infection: As there is a chance of infection we give you antibiotics before the procedure to reduce the risk. However is you experience a fever, chills, nausea, or just not feeling up to 72 hours after the biopsy you need to go directly to the emergency room.   Antibiotics: The physician will send a prescription to your preferred pharmacy.  We will call you once your culture results come back to let you that you can pick them up. You will follow the directions on the bottle.  Sometimes 2 different antibiotics are sent. Start the antibiotics on THE DAY OF THE BIOPSY.   Bleeding: Some men will see blood in the urine, semen and stool.  Bleeding can last for 6-8 weeks intermittently.  If you take any medication such as aspirin, warfarin, clopidogrel then the risk of bleeding will be higher.   Retention: Some men are unable to pass urine after the biopsy. This is called urine retention. This is very rare.  If you are unable to urinate please call our clinic or go to urgent care or the emergency room.   Getting the results:  The biopsy samples will be sent to a laboratory for examination by a pathologist.  It can take up to one week, sometimes longer, for your doctor to receive the results.  You will be given an appointment to return to clinic for the results of your biopsy within 1-2 weeks. If you do not receive an appointment for the results of the biopsy at the time of the scheduling, please contact the clinic to arrange an appointment for the results.   WE ARE NOT IN ANY CIRCUMSTANCES PERMITTED TO GIVE RESULTS OVER THE PHONE    What to expect following a biopsy:  You are advised to take it  easy for the remainder of the day.  You may eat and drink as normal.  No restrictions to your normal daily routine.  As there is a risk of infection you will be given a course of antibiotics.  PLEASE COMPLETE THE FULL COURSE AS INSTRUCTED   Contact information:  Please call the clinic with any further questions [phone] *Do not leave an urgent message on this voicemail as we may not receive it until the following business day*  y.

## 2022-10-05 DIAGNOSIS — I10 HYPERTENSION GOAL BP (BLOOD PRESSURE) < 140/90: ICD-10-CM

## 2022-10-05 RX ORDER — CARVEDILOL 25 MG/1
TABLET ORAL
Qty: 180 TABLET | Refills: 3 | Status: SHIPPED | OUTPATIENT
Start: 2022-10-05 | End: 2023-10-31

## 2022-12-09 ENCOUNTER — OFFICE VISIT (OUTPATIENT)
Dept: FAMILY MEDICINE | Facility: CLINIC | Age: 57
End: 2022-12-09
Payer: COMMERCIAL

## 2022-12-09 VITALS
WEIGHT: 315 LBS | BODY MASS INDEX: 44.3 KG/M2 | TEMPERATURE: 97.8 F | RESPIRATION RATE: 20 BRPM | HEART RATE: 71 BPM | OXYGEN SATURATION: 98 % | DIASTOLIC BLOOD PRESSURE: 74 MMHG | SYSTOLIC BLOOD PRESSURE: 130 MMHG

## 2022-12-09 DIAGNOSIS — Z23 NEED FOR COVID-19 VACCINE: ICD-10-CM

## 2022-12-09 DIAGNOSIS — E66.01 MORBID OBESITY (H): ICD-10-CM

## 2022-12-09 DIAGNOSIS — N64.4 NIPPLE PAIN: Primary | ICD-10-CM

## 2022-12-09 DIAGNOSIS — T78.40XA ALLERGIC REACTION, INITIAL ENCOUNTER: ICD-10-CM

## 2022-12-09 DIAGNOSIS — E11.65 TYPE 2 DIABETES MELLITUS WITH HYPERGLYCEMIA, WITHOUT LONG-TERM CURRENT USE OF INSULIN (H): ICD-10-CM

## 2022-12-09 DIAGNOSIS — I10 HYPERTENSION GOAL BP (BLOOD PRESSURE) < 140/90: ICD-10-CM

## 2022-12-09 DIAGNOSIS — R22.9 LOCALIZED SKIN MASS, LUMP, OR SWELLING: ICD-10-CM

## 2022-12-09 LAB
ANION GAP SERPL CALCULATED.3IONS-SCNC: 5 MMOL/L (ref 3–14)
BUN SERPL-MCNC: 11 MG/DL (ref 7–30)
CALCIUM SERPL-MCNC: 9.3 MG/DL (ref 8.5–10.1)
CHLORIDE BLD-SCNC: 101 MMOL/L (ref 94–109)
CO2 SERPL-SCNC: 30 MMOL/L (ref 20–32)
CREAT SERPL-MCNC: 0.9 MG/DL (ref 0.66–1.25)
GFR SERPL CREATININE-BSD FRML MDRD: >90 ML/MIN/1.73M2
GLUCOSE BLD-MCNC: 181 MG/DL (ref 70–99)
HBA1C MFR BLD: 8.2 % (ref 0–5.6)
POTASSIUM BLD-SCNC: 3.9 MMOL/L (ref 3.4–5.3)
SODIUM SERPL-SCNC: 136 MMOL/L (ref 133–144)

## 2022-12-09 PROCEDURE — 80048 BASIC METABOLIC PNL TOTAL CA: CPT | Performed by: FAMILY MEDICINE

## 2022-12-09 PROCEDURE — 86003 ALLG SPEC IGE CRUDE XTRC EA: CPT | Performed by: FAMILY MEDICINE

## 2022-12-09 PROCEDURE — 86003 ALLG SPEC IGE CRUDE XTRC EA: CPT | Mod: 59 | Performed by: FAMILY MEDICINE

## 2022-12-09 PROCEDURE — 99214 OFFICE O/P EST MOD 30 MIN: CPT | Performed by: FAMILY MEDICINE

## 2022-12-09 PROCEDURE — 36415 COLL VENOUS BLD VENIPUNCTURE: CPT | Performed by: FAMILY MEDICINE

## 2022-12-09 PROCEDURE — 91312 COVID-19 VACCINE BIVALENT BOOSTER 12+ (PFIZER): CPT | Performed by: FAMILY MEDICINE

## 2022-12-09 PROCEDURE — 0124A COVID-19 VACCINE BIVALENT BOOSTER 12+ (PFIZER): CPT | Performed by: FAMILY MEDICINE

## 2022-12-09 PROCEDURE — 83036 HEMOGLOBIN GLYCOSYLATED A1C: CPT | Performed by: FAMILY MEDICINE

## 2022-12-09 RX ORDER — FLUTICASONE PROPIONATE 50 MCG
1-2 SPRAY, SUSPENSION (ML) NASAL
COMMUNITY
Start: 2021-06-30 | End: 2023-04-10

## 2022-12-09 ASSESSMENT — PAIN SCALES - GENERAL: PAINLEVEL: NO PAIN (0)

## 2022-12-09 NOTE — PROGRESS NOTES
"  Assessment & Plan     Nipple pain  Discussed testing verse stopping spironolactone - patient opted for testing since BP is controlled  - US Breast Bilateral Complete 4 Quadrants; Future    Type 2 diabetes mellitus with hyperglycemia, without long-term current use of insulin (H)  Check lab and weight program planned by patient  - Hemoglobin A1c; Future    Morbid obesity (H)  As above    Hypertension goal BP (blood pressure) < 140/90  Controlled - check labs  - BASIC METABOLIC PANEL; Future    Localized skin mass, lump, or swelling  US for evaluation, suspect lipoma  - US Head Neck Soft Tissue; Future    Allergic reaction, initial encounter  Check lab  - Allergen soybean IgE; Future  - Allergen ragweed short IgE; Future  - Allergen orchard grass IgE; Future  - Allergen oak white IgE; Future  - Allergen milk IgE; Future  - Allergen giant ragweed IgE; Future    Need for COVID-19 vaccine    - COVID-19 VACCINE BIVALENT BOOSTER 12+ (PFIZER)     BMI:   Estimated body mass index is 44.3 kg/m  as calculated from the following:    Height as of 1/31/22: 1.854 m (6' 1\").    Weight as of this encounter: 152.3 kg (335 lb 12.8 oz).   Weight management plan: Discussed healthy diet and exercise guidelines        Return in about 6 months (around 6/9/2023).    Mona Scales MD  [facility]    Khris Armenta is a 57 year old, presenting for the following health issues:  Diabetes and Hypertension      History of Present Illness       Diabetes:   He presents for follow up of diabetes.  He is checking home blood glucose a few times a week. He checks blood glucose before meals and before and after meals.  Blood glucose is never over 200 and never under 70. He is aware of hypoglycemia symptoms including shakiness. He has no concerns regarding his diabetes at this time.  He is not experiencing numbness or burning in feet, excessive thirst, blurry vision, weight changes or redness, sores or " blisters on feet.         Hypertension: He presents for follow up of hypertension.  He does check blood pressure  regularly outside of the clinic. Outside blood pressures have been over 140/90. He follows a low salt diet.         Nipple tenderness for about 1 year.  Has gained weight. Started spironolactone about that time.    Mass on forehead for few week. No trauma.  Not rapidly enlarging.    Allergic reaction at [facility], had chicken cooked on the grill.  No allergy to shellfish when tested.  Seen at ED and treated.      Review of Systems   Constitutional, HEENT, cardiovascular, pulmonary, gi and gu systems are negative, except as otherwise noted.      Objective    /74 (BP Location: Left arm)   Pulse 71   Temp 97.8  F (36.6  C) (Tympanic)   Resp 20   Wt (!) 152.3 kg (335 lb 12.8 oz)   SpO2 98%   BMI 44.30 kg/m    Body mass index is 44.3 kg/m .  Physical Exam   GENERAL: healthy, alert, no distress and obese  HENT: ear canals and TM's normal, nose and mouth without ulcers or lesions  NECK: no adenopathy, no asymmetry, masses, or scars and thyroid normal to palpation  RESP: lungs clear to auscultation - no rales, rhonchi or wheezes  CV: regular rate and rhythm, normal S1 S2, no S3 or S4, no murmur, click or rub, no peripheral edema and peripheral pulses strong  ABDOMEN: soft, nontender, no hepatosplenomegaly, no masses and bowel sounds normal  MS: no gross musculoskeletal defects noted, no edema  PSYCH: mentation appears normal, affect normal/bright

## 2022-12-11 LAB
COCKSFOOT IGE QN: 1.22 KU(A)/L
COMMON RAGWEED IGE QN: 0.12 KU(A)/L
COW MILK IGE QN: <0.1 KU(A)/L
GIANT RAGWEED IGE QN: 0.17 KU(A)/L
SOYBEAN IGE QN: <0.1 KU(A)/L
WHITE OAK IGE QN: <0.1 KU(A)/L

## 2022-12-15 NOTE — RESULT ENCOUNTER NOTE
Mr. Marte,    Your labs show mild allergies to ragweed and moderate allergies to grass.  Your diabetes has also worsened so your plan to lose weight is a great idea.    Follow up in 3 months for a recheck.    Please contact the clinic if you have additional questions.  Thank you.    Sincerely,    Mona Scales MD    
None

## 2022-12-19 ENCOUNTER — TELEPHONE (OUTPATIENT)
Dept: FAMILY MEDICINE | Facility: CLINIC | Age: 57
End: 2022-12-19

## 2022-12-19 NOTE — TELEPHONE ENCOUNTER
Patient Quality Outreach    Patient is due for the following:   Diabetes -  Diabetic Follow-Up Visit    Next Steps:   Patient has upcoming appointment, these items will be addressed at that time.    Type of outreach:    Chart review performed, no outreach needed.    Questions for provider review:    None     Zac Ramirez

## 2022-12-20 ENCOUNTER — ANCILLARY PROCEDURE (OUTPATIENT)
Dept: ULTRASOUND IMAGING | Facility: CLINIC | Age: 57
End: 2022-12-20
Attending: FAMILY MEDICINE
Payer: COMMERCIAL

## 2022-12-20 ENCOUNTER — ANCILLARY PROCEDURE (OUTPATIENT)
Dept: MAMMOGRAPHY | Facility: CLINIC | Age: 57
End: 2022-12-20
Attending: FAMILY MEDICINE
Payer: COMMERCIAL

## 2022-12-20 DIAGNOSIS — N64.4 NIPPLE PAIN: ICD-10-CM

## 2022-12-20 DIAGNOSIS — R22.9 LOCALIZED SKIN MASS, LUMP, OR SWELLING: ICD-10-CM

## 2022-12-20 PROCEDURE — 77066 DX MAMMO INCL CAD BI: CPT | Performed by: RADIOLOGY

## 2022-12-20 PROCEDURE — 76536 US EXAM OF HEAD AND NECK: CPT | Performed by: RADIOLOGY

## 2022-12-20 NOTE — LETTER
Geovany Marte  [address]  [place] [address]            December 20, 2022    Date of Exam:     Dear Geovany:    Thank you for your recent visit.     Breast Imaging Result: We are pleased to inform you that the results of your recent breast imaging show no evidence of malignancy (cancer). Please check with your health care team for instructions on follow-up based on your medical history and physical exam findings.     Remember that negative breast imaging does not exclude the possibility of breast disease.  You should never ignore a breast lump or any other change in your breasts, even if the breast imaging is normal.  If you are experiencing any breast problems such as a lump or localized pain we request that you discuss this with your health care team if you haven t already done so, as additional testing may be necessary.    A report of your breast imaging results was sent to: Mona Scales    Your breast imaging will become part of your medical file here at [facility] for at least 10 years. You are responsible for informing any new health care team or breast imaging facility of the date and location of this examination.     We appreciate the opportunity to participate in your health care.    Sincerely,  Dr. Demetrius ORTA [facility]

## 2022-12-27 NOTE — RESULT ENCOUNTER NOTE
MrSaeid Marte,    No abnormal breast tissue was seen.    Please contact the clinic if you have additional questions.  Thank you.    Sincerely,    Mona Scales MD

## 2022-12-27 NOTE — RESULT ENCOUNTER NOTE
Mr. Marte,    The ultrasound showed the mass was a lipoma (non cancerous collection of fatty tissue).  This should get smaller as you lose weight or you could have it surgically removed.    Please contact the clinic if you have additional questions.  Thank you.    Sincerely,    Mona Scales MD

## 2022-12-28 ENCOUNTER — MYC MEDICAL ADVICE (OUTPATIENT)
Dept: FAMILY MEDICINE | Facility: CLINIC | Age: 57
End: 2022-12-28

## 2022-12-28 DIAGNOSIS — I10 HYPERTENSION GOAL BP (BLOOD PRESSURE) < 140/90: Primary | ICD-10-CM

## 2022-12-30 RX ORDER — HYDROCHLOROTHIAZIDE 12.5 MG/1
12.5 TABLET ORAL EVERY MORNING
Qty: 90 TABLET | Refills: 0 | Status: SHIPPED | OUTPATIENT
Start: 2022-12-30 | End: 2023-03-06

## 2023-03-06 DIAGNOSIS — E78.5 HYPERLIPIDEMIA LDL GOAL <100: ICD-10-CM

## 2023-03-06 RX ORDER — PRAVASTATIN SODIUM 80 MG/1
TABLET ORAL
Qty: 90 TABLET | Refills: 3 | Status: SHIPPED | OUTPATIENT
Start: 2023-03-06 | End: 2024-02-19

## 2023-03-28 ENCOUNTER — LAB (OUTPATIENT)
Dept: LAB | Facility: CLINIC | Age: 58
End: 2023-03-28
Payer: COMMERCIAL

## 2023-03-28 DIAGNOSIS — I10 HYPERTENSION GOAL BP (BLOOD PRESSURE) < 140/90: ICD-10-CM

## 2023-03-28 DIAGNOSIS — C61 PROSTATE CANCER (H): ICD-10-CM

## 2023-03-28 LAB
ANION GAP SERPL CALCULATED.3IONS-SCNC: 7 MMOL/L (ref 3–14)
BUN SERPL-MCNC: 14 MG/DL (ref 7–30)
CALCIUM SERPL-MCNC: 9 MG/DL (ref 8.5–10.1)
CHLORIDE BLD-SCNC: 104 MMOL/L (ref 94–109)
CO2 SERPL-SCNC: 26 MMOL/L (ref 20–32)
CREAT SERPL-MCNC: 0.92 MG/DL (ref 0.66–1.25)
GFR SERPL CREATININE-BSD FRML MDRD: >90 ML/MIN/1.73M2
GLUCOSE BLD-MCNC: 182 MG/DL (ref 70–99)
POTASSIUM BLD-SCNC: 4 MMOL/L (ref 3.4–5.3)
PSA SERPL-MCNC: 5.16 UG/L (ref 0–4)
SODIUM SERPL-SCNC: 137 MMOL/L (ref 133–144)

## 2023-03-28 PROCEDURE — 80048 BASIC METABOLIC PNL TOTAL CA: CPT

## 2023-03-28 PROCEDURE — 36415 COLL VENOUS BLD VENIPUNCTURE: CPT

## 2023-03-28 PROCEDURE — 84153 ASSAY OF PSA TOTAL: CPT

## 2023-03-28 NOTE — RESULT ENCOUNTER NOTE
MrSaeid Marte,    Your kidney function is stable but your blood sugar is still very high.    Please contact the clinic if you have additional questions.  Thank you.    Sincerely,    Mona Scales MD

## 2023-03-31 ENCOUNTER — TELEPHONE (OUTPATIENT)
Dept: FAMILY MEDICINE | Facility: CLINIC | Age: 58
End: 2023-03-31

## 2023-03-31 ENCOUNTER — OFFICE VISIT (OUTPATIENT)
Dept: FAMILY MEDICINE | Facility: CLINIC | Age: 58
End: 2023-03-31
Payer: COMMERCIAL

## 2023-03-31 VITALS
HEART RATE: 56 BPM | RESPIRATION RATE: 16 BRPM | OXYGEN SATURATION: 99 % | BODY MASS INDEX: 41.75 KG/M2 | HEIGHT: 73 IN | SYSTOLIC BLOOD PRESSURE: 130 MMHG | TEMPERATURE: 99 F | WEIGHT: 315 LBS | DIASTOLIC BLOOD PRESSURE: 80 MMHG

## 2023-03-31 DIAGNOSIS — E11.65 TYPE 2 DIABETES MELLITUS WITH HYPERGLYCEMIA, WITHOUT LONG-TERM CURRENT USE OF INSULIN (H): Primary | ICD-10-CM

## 2023-03-31 DIAGNOSIS — C61 PROSTATE CANCER (H): ICD-10-CM

## 2023-03-31 DIAGNOSIS — E66.01 MORBID OBESITY (H): ICD-10-CM

## 2023-03-31 LAB
CHOLEST SERPL-MCNC: 159 MG/DL
CREAT UR-MCNC: 142 MG/DL
FASTING STATUS PATIENT QL REPORTED: YES
HBA1C MFR BLD: 8.3 % (ref 0–5.6)
HDLC SERPL-MCNC: 44 MG/DL
LDLC SERPL CALC-MCNC: 97 MG/DL
MICROALBUMIN UR-MCNC: 6 MG/L
MICROALBUMIN/CREAT UR: 4.23 MG/G CR (ref 0–17)
NONHDLC SERPL-MCNC: 115 MG/DL
TRIGL SERPL-MCNC: 88 MG/DL

## 2023-03-31 PROCEDURE — 83036 HEMOGLOBIN GLYCOSYLATED A1C: CPT | Performed by: FAMILY MEDICINE

## 2023-03-31 PROCEDURE — 90746 HEPB VACCINE 3 DOSE ADULT IM: CPT | Performed by: FAMILY MEDICINE

## 2023-03-31 PROCEDURE — 90471 IMMUNIZATION ADMIN: CPT | Performed by: FAMILY MEDICINE

## 2023-03-31 PROCEDURE — 82043 UR ALBUMIN QUANTITATIVE: CPT | Performed by: FAMILY MEDICINE

## 2023-03-31 PROCEDURE — 99214 OFFICE O/P EST MOD 30 MIN: CPT | Mod: 25 | Performed by: FAMILY MEDICINE

## 2023-03-31 PROCEDURE — 80061 LIPID PANEL: CPT | Performed by: FAMILY MEDICINE

## 2023-03-31 PROCEDURE — 36415 COLL VENOUS BLD VENIPUNCTURE: CPT | Performed by: FAMILY MEDICINE

## 2023-03-31 PROCEDURE — 82570 ASSAY OF URINE CREATININE: CPT | Performed by: FAMILY MEDICINE

## 2023-03-31 ASSESSMENT — ASTHMA QUESTIONNAIRES
QUESTION_1 LAST FOUR WEEKS HOW MUCH OF THE TIME DID YOUR ASTHMA KEEP YOU FROM GETTING AS MUCH DONE AT WORK, SCHOOL OR AT HOME: ALL OF THE TIME
ACT_TOTALSCORE: 20
ACT_TOTALSCORE: 20
QUESTION_2 LAST FOUR WEEKS HOW OFTEN HAVE YOU HAD SHORTNESS OF BREATH: NOT AT ALL
QUESTION_4 LAST FOUR WEEKS HOW OFTEN HAVE YOU USED YOUR RESCUE INHALER OR NEBULIZER MEDICATION (SUCH AS ALBUTEROL): NOT AT ALL
QUESTION_3 LAST FOUR WEEKS HOW OFTEN DID YOUR ASTHMA SYMPTOMS (WHEEZING, COUGHING, SHORTNESS OF BREATH, CHEST TIGHTNESS OR PAIN) WAKE YOU UP AT NIGHT OR EARLIER THAN USUAL IN THE MORNING: NOT AT ALL
QUESTION_5 LAST FOUR WEEKS HOW WOULD YOU RATE YOUR ASTHMA CONTROL: WELL CONTROLLED

## 2023-03-31 ASSESSMENT — PAIN SCALES - GENERAL: PAINLEVEL: NO PAIN (0)

## 2023-03-31 NOTE — TELEPHONE ENCOUNTER
PA Initiation    Medication: Ozempic  Insurance Company:  [facility]   Pharmacy Filling the Rx:    Filling Pharmacy Phone:    Filling Pharmacy Fax:    Start Date: 3/31/2023  Phone Prior Auth Started:Step Therapy: Hx of metformin (generic Glucophage, Glucophage XR) req PA Required call [phone] Drug Requires Prior Authorization

## 2023-03-31 NOTE — RESULT ENCOUNTER NOTE
Mr. Marte,    Your diabetes is slightly worse than 3 months ago.  The ozempic will help a lot.  Your urine kidney test and cholesterol look great.    Please contact the clinic if you have additional questions.  Thank you.    Sincerely,    Mona Scales MD

## 2023-03-31 NOTE — NURSING NOTE
Prior to immunization administration, verified patients identity using patient s name and date of birth. Please see Immunization Activity for additional information.     Screening Questionnaire for Adult Immunization    Are you sick today?   No   Do you have allergies to medications, food, a vaccine component or latex?   No   Have you ever had a serious reaction after receiving a vaccination?   No   Do you have a long-term health problem with heart, lung, kidney, or metabolic disease (e.g., diabetes), asthma, a blood disorder, no spleen, complement component deficiency, a cochlear implant, or a spinal fluid leak?  Are you on long-term aspirin therapy?   No   Do you have cancer, leukemia, HIV/AIDS, or any other immune system problem?   No   Do you have a parent, brother, or sister with an immune system problem?   No   In the past 3 months, have you taken medications that affect  your immune system, such as prednisone, other steroids, or anticancer drugs; drugs for the treatment of rheumatoid arthritis, Crohn s disease, or psoriasis; or have you had radiation treatments?   No   Have you had a seizure, or a brain or other nervous system problem?   No   During the past year, have you received a transfusion of blood or blood    products, or been given immune (gamma) globulin or antiviral drug?   No   For women: Are you pregnant or is there a chance you could become       pregnant during the next month?   No   Have you received any vaccinations in the past 4 weeks?   No     Immunization questionnaire answers were all negative.      Injection of Hep b given by Conor Medrano MA. Patient instructed to remain in clinic for 15 minutes afterwards, and to report any adverse reactions.     Screening performed by Conor Medrano MA on 3/31/2023 at 9:36 AM.

## 2023-03-31 NOTE — PATIENT INSTRUCTIONS
Ketotifen allergy eye drops.  Ketogenic eating plan    Take a look at the Paleo diet as it is a milder version of the ketogenic diet, also.    Watch this video to see why just eating less calories does not work: https://www.youtube.com/watch?v=mNYlIcXynwE.    This eating plan is recommended to you to lower you bad cholesterol, diabetes risk, blood sugars and potential liver damage.    This plan resolves around low carbohydrate/starchy food intake with moderate protein intake and high fat intake.  I recommend natural, minimally processed foods.  You can have eggs, butter, lindsey, full fat cheese full fat cream cheese and heavy cream.  Most nuts and seeds are benecial as well.    If you use a calories track too, like my fitness pal or similar, I recommend 70% of calories come from fat, 25% of calories come from protein and 5% of your calories come from non starchy veggies.  Non-starchy veggies would include the vegetables that grow above ground.  If you plan to take in fruit, stick to berries and treat as a dessert. Avoid sugar, high fructose corn syrup, and corn syrup sweeteners.  It should be okay to use Splenda and stevia sweeteners. Avoid corn (this is more of a grain than a veggie), regular soda, potatoes, rice, wheat and pasta.    Please look at www.ketoconnect.net, www.SandboxcarbdEvoApper.Quanterix.au, Ketogenic-dietBromiumresource.Quanterix, Google Ketogenic diets and check out Ketoconnect on YouTube.    Http://www.ncbi.nlm.nih.gov/pmc/articles/BSE4049613/ is a study which shows long term ketogenic diets are safe and work for weight loss and cholesterol improvement.    These recommendations are general and we should discuss your response to this on a regular basis so we can adjust these recommendations for you.    Note of caution: it will take about 2 weeks for the ketogenic diet to shift you into fat burning as a primary fuel source.  You may feel fatigued and have slight trouble thinking over the first 2 weeks as you shift from  carbohydrate as a primary fuel source to fat as a primary source.  Taking 250 mg of Magnesium and increasing your salt intake every day will help.  (Yes one of the few times you provider will tell you to eat more salt!) At [facility], we strive to deliver an exceptional experience to you, every time we see you. If you receive a survey, please complete it as we do value your feedback.  If you have MyChart, you can expect to receive results automatically within 24 hours of their completion.  Your provider will send a note interpreting your results as well.   If you do not have MyChart, you should receive your results in about a week by mail.    Your care team:                            Family Medicine Internal Medicine   MD Gurpreet Howard MD Shantel Branch-Fleming, MD Srinivasa Vaka, MD Katya Belousova, GABBIE SteinHillGEOVANNI Keyes CNP, MD Pediatrics   Dmitry Chavez, MD Saida Blanton MD Amelia Massimini APRN CNP Kim Thein, APRN CNP Bethany Templen, MD Charanya Pasupathi, MD          Clinic hours: Monday - Thursday 7 am-6 pm; Fridays 7 am-5 pm.   Urgent care: Monday - Friday 10 am- 8 pm; Saturday and Sunday 9 am-5 pm.    Clinic: [phone]       [facility]: Monday - Thursday 8 am - 7 pm; Friday 8 am - 6 pm  [facility]: [phone]

## 2023-03-31 NOTE — PROGRESS NOTES
Assessment & Plan     Type 2 diabetes mellitus with hyperglycemia, without long-term current use of insulin (H)  Not controlled - add ozempic  - Lipid panel reflex to direct LDL Non-fasting; Future  - Albumin Random Urine Quantitative with Creat Ratio; Future  - Hemoglobin A1c; Future  - semaglutide (OZEMPIC) 2 MG/1.5ML SOPN pen; Inject 0.25 mg Subcutaneous every 7 days for 14 days, THEN 0.5 mg every 7 days for 21 days.  - Semaglutide, 1 MG/DOSE, (OZEMPIC) 4 MG/3ML pen; Inject 1 mg Subcutaneous every 7 days  - Lipid panel reflex to direct LDL Non-fasting  - Albumin Random Urine Quantitative with Creat Ratio  - Hemoglobin A1c    Morbid obesity (H)  Not improved - trial of keto diet and add ozempic.  Move calories to earlier in the day  - semaglutide (OZEMPIC) 2 MG/1.5ML SOPN pen; Inject 0.25 mg Subcutaneous every 7 days for 14 days, THEN 0.5 mg every 7 days for 21 days.  - Semaglutide, 1 MG/DOSE, (OZEMPIC) 4 MG/3ML pen; Inject 1 mg Subcutaneous every 7 days    Prostate cancer (H)  Continue per urology    The uses and side effects, including black box warnings as appropriate, were discussed in detail.  All patient questions were answered.  The patient was instructed to call immediately if any side effects developed.     Mona Scales MD  [facility]    Khris Armenta is a 57 year old, presenting for the following health issues:  Diabetes  No flowsheet data found.  History of Present Illness       Diabetes:   He presents for follow up of diabetes.  He is checking home blood glucose a few times a month. He checks blood glucose before meals.  Blood glucose is never over 200 and never under 70. He is aware of hypoglycemia symptoms including shakiness. He has no concerns regarding his diabetes at this time.  He is not experiencing numbness or burning in feet, excessive thirst, blurry vision, weight changes or redness, sores or blisters on feet.         He eats 0-1 servings of  "fruits and vegetables daily.He consumes 1 sweetened beverage(s) daily.He exercises with enough effort to increase his heart rate 10 to 19 minutes per day.  He exercises with enough effort to increase his heart rate 3 or less days per week.   He is taking medications regularly.           Review of Systems   Constitutional, HEENT, cardiovascular, pulmonary, gi and gu systems are negative, except as otherwise noted.      Objective    /80 (BP Location: Right arm)   Pulse 56   Temp 99  F (37.2  C) (Oral)   Resp 16   Ht 1.847 m (6' 0.72\")   Wt (!) 154.7 kg (341 lb)   SpO2 99%   BMI 45.34 kg/m    Body mass index is 45.34 kg/m .  Physical Exam   GENERAL: healthy, alert, no distress and obese  NECK: no adenopathy, no asymmetry, masses, or scars and thyroid normal to palpation  RESP: lungs clear to auscultation - no rales, rhonchi or wheezes  CV: regular rate and rhythm, normal S1 S2, no S3 or S4, no murmur, click or rub, no peripheral edema and peripheral pulses strong  ABDOMEN: soft, nontender, no hepatosplenomegaly, no masses and bowel sounds normal  MS: no gross musculoskeletal defects noted, no edema              "

## 2023-04-03 ENCOUNTER — MYC MEDICAL ADVICE (OUTPATIENT)
Dept: FAMILY MEDICINE | Facility: CLINIC | Age: 58
End: 2023-04-03
Payer: COMMERCIAL

## 2023-04-03 DIAGNOSIS — C61 PROSTATE CANCER (H): Primary | ICD-10-CM

## 2023-04-03 RX ORDER — LEVOFLOXACIN 500 MG/1
500 TABLET, FILM COATED ORAL DAILY
Qty: 3 TABLET | Refills: 0 | Status: SHIPPED | OUTPATIENT
Start: 2023-04-03 | End: 2023-04-10

## 2023-04-04 ENCOUNTER — ANCILLARY PROCEDURE (OUTPATIENT)
Dept: ULTRASOUND IMAGING | Facility: CLINIC | Age: 58
End: 2023-04-04
Payer: COMMERCIAL

## 2023-04-04 ENCOUNTER — OFFICE VISIT (OUTPATIENT)
Dept: UROLOGY | Facility: CLINIC | Age: 58
End: 2023-04-04
Payer: COMMERCIAL

## 2023-04-04 DIAGNOSIS — C61 PROSTATE CANCER (H): ICD-10-CM

## 2023-04-04 DIAGNOSIS — C61 PROSTATE CANCER (H): Primary | ICD-10-CM

## 2023-04-04 PROCEDURE — 88344 IMHCHEM/IMCYTCHM EA MLT ANTB: CPT | Performed by: PATHOLOGY

## 2023-04-04 PROCEDURE — 96372 THER/PROPH/DIAG INJ SC/IM: CPT | Mod: 59 | Performed by: UROLOGY

## 2023-04-04 PROCEDURE — G0416 PROSTATE BIOPSY, ANY MTHD: HCPCS | Performed by: PATHOLOGY

## 2023-04-04 PROCEDURE — 55700 PR BIOPSY OF PROSTATE,NEEDLE/PUNCH: CPT | Performed by: UROLOGY

## 2023-04-04 PROCEDURE — 76942 ECHO GUIDE FOR BIOPSY: CPT | Performed by: UROLOGY

## 2023-04-04 RX ORDER — GENTAMICIN 40 MG/ML
80 INJECTION, SOLUTION INTRAMUSCULAR; INTRAVENOUS ONCE
Status: COMPLETED | OUTPATIENT
Start: 2023-04-04 | End: 2023-04-04

## 2023-04-04 RX ADMIN — GENTAMICIN 80 MG: 40 INJECTION, SOLUTION INTRAMUSCULAR; INTRAVENOUS at 08:09

## 2023-04-04 NOTE — PROGRESS NOTES
Clinic Administered Medication Documentation        Patient was given gentamicin. Prior to medication administration, verified patient's identity using patient s name and date of birth. Please see MAR and medication order for additional information. Patient instructed to remain in clinic for 15 minutes and report any adverse reaction to staff immediately.    Vial/Syringe: Single dose vial. Was entire vial of medication used? Yes  The following medication was given:     MEDICATION: gentamicin  ROUTE: IM  SITE: Ventrogluteal - Right  DOSE: 80mg  LOT #: [id number]  :  [employer]  EXPIRATION DATE:  11/23  NDC#: 55757-881-72

## 2023-04-04 NOTE — PROGRESS NOTES
Patient is here for prostate biopsy.  He was placed in the dorsal lithotomy position.  Prostate ultrasound placed transrectally.  The neurovascular bundle was anesthetized using 1% lidocaine.  Prostate measurements obtained.  Prostate size is 62 gms.  12 biopsies obtained under guidance of prostate ultrasound using biopsy needle.  Patient tolerated procedure.  Return to clinic in one week for biopsy result.

## 2023-04-04 NOTE — TELEPHONE ENCOUNTER
Prior Authorization Not Needed per Insurance    Medication: Ozempic  Insurance Company: [employer] ([facility]) - Phone [phone] Fax [phone]  Expected CoPay:      Pharmacy Filling the Rx: [id number] [address]  Pharmacy Notified: Yes  Patient Notified: Yes    Previously approved 3/31/23-3/31/24

## 2023-04-04 NOTE — TELEPHONE ENCOUNTER
Looks like [facility] Liaison team initiated this request.  I will route back to liaison team if the Rx still needs to be released to the pharmacy.

## 2023-04-07 LAB
PATH REPORT.COMMENTS IMP SPEC: NORMAL
PATH REPORT.COMMENTS IMP SPEC: NORMAL
PATH REPORT.FINAL DX SPEC: NORMAL
PATH REPORT.GROSS SPEC: NORMAL
PATH REPORT.MICROSCOPIC SPEC OTHER STN: NORMAL
PATH REPORT.MICROSCOPIC SPEC OTHER STN: NORMAL
PATH REPORT.RELEVANT HX SPEC: NORMAL
PHOTO IMAGE: NORMAL

## 2023-04-11 ENCOUNTER — VIRTUAL VISIT (OUTPATIENT)
Dept: UROLOGY | Facility: CLINIC | Age: 58
End: 2023-04-11
Payer: COMMERCIAL

## 2023-04-11 DIAGNOSIS — C61 PROSTATE CANCER (H): Primary | ICD-10-CM

## 2023-04-11 PROCEDURE — 99212 OFFICE O/P EST SF 10 MIN: CPT | Mod: VID | Performed by: UROLOGY

## 2023-04-11 NOTE — PROGRESS NOTES
Geovany is a 57 year old who is being evaluated via a billable video visit.      How would you like to obtain your AVS? MyChart  If the video visit is dropped, the invitation should be resent by: Text to cell phone: [phone]  Will anyone else be joining your video visit? No          Assessment & Plan   Problem List Items Addressed This Visit     Prostate cancer (H) - Primary                     No follow-ups on file.    Zac Ivy MD  [facility]    Khris Armenta is a 57 year old, presenting for the following health issues:  Video Visit    HPI      f/u for prostate cancer group 1 under active observation.  He is without any complaints. Prostate MRI on 2022 was ok.  Recent psa was stable.  He underwent prostate biopsy last week.      Review of Systems   Constitutional, HEENT, cardiovascular, pulmonary, gi and gu systems are negative, except as otherwise noted.      Objective           Vitals:  No vitals were obtained today due to virtual visit.    Physical Exam   GENERAL: Healthy, alert and no distress  EYES: Eyes grossly normal to inspection.  No discharge or erythema, or obvious scleral/conjunctival abnormalities.  RESP: No audible wheeze, cough, or visible cyanosis.  No visible retractions or increased work of breathing.    SKIN: Visible skin clear. No significant rash, abnormal pigmentation or lesions.  NEURO: Cranial nerves grossly intact.  Mentation and speech appropriate for age.  PSYCH: Mentation appears normal, affect normal/bright, judgement and insight intact, normal speech and appearance well-groomed.    Case Report   Surgical Pathology Report                         Case: [id number]                                   Authorizing Provider:  Zac Ivy MD     Collected:           04/04/2023 08:02 AM           Ordering Location:     [facility]   Received:            04/04/2023 08:02 AM                                  [place]                                                                        Pathologist:           Shari Solis MD                                                       Specimens:   A) - Prostate, Left                                                                                  B) - Prostate, Right                                                                       Final Diagnosis   A.  Prostate, left, biopsy   - Benign prostatic tissue. Negative for malignancy     B.  Prostate, right, biopsy   -Benign prostatic tissue. Negative for malignancy      Electronically signed by Shari Solis MD on 4/7/2023 at  3:36 PM       Prostate cancer:  Group 1 with negative repeat biopsy.  Recheck psa in six months.            Video-Visit Details    Type of service:  Video Visit     Originating Location (pt. Location): Home    Distant Location (provider location):  On-site  Platform used for Video Visit: [employer]

## 2023-04-14 ENCOUNTER — MYC MEDICAL ADVICE (OUTPATIENT)
Dept: FAMILY MEDICINE | Facility: CLINIC | Age: 58
End: 2023-04-14
Payer: COMMERCIAL

## 2023-04-14 DIAGNOSIS — E11.65 TYPE 2 DIABETES MELLITUS WITH HYPERGLYCEMIA, WITHOUT LONG-TERM CURRENT USE OF INSULIN (H): ICD-10-CM

## 2023-04-14 DIAGNOSIS — E66.01 MORBID OBESITY (H): ICD-10-CM

## 2023-04-17 NOTE — TELEPHONE ENCOUNTER
Writer called to the pharmacy and the prescription was not received per pharmacy. Requesting this be sent to them electronically.  Lisa Sin RN    [facility]- Primary Care

## 2023-04-23 ENCOUNTER — HEALTH MAINTENANCE LETTER (OUTPATIENT)
Age: 58
End: 2023-04-23

## 2023-07-07 DIAGNOSIS — I10 HYPERTENSION GOAL BP (BLOOD PRESSURE) < 140/90: ICD-10-CM

## 2023-07-07 RX ORDER — HYDROCHLOROTHIAZIDE 12.5 MG/1
TABLET ORAL
Qty: 90 TABLET | Refills: 3 | OUTPATIENT
Start: 2023-07-07

## 2023-07-20 ENCOUNTER — OFFICE VISIT (OUTPATIENT)
Dept: OPTOMETRY | Facility: CLINIC | Age: 58
End: 2023-07-20
Payer: COMMERCIAL

## 2023-07-20 DIAGNOSIS — H40.003 GLAUCOMA SUSPECT OF BOTH EYES: ICD-10-CM

## 2023-07-20 DIAGNOSIS — H52.4 PRESBYOPIA: ICD-10-CM

## 2023-07-20 DIAGNOSIS — E11.9 TYPE 2 DIABETES MELLITUS WITHOUT COMPLICATION, WITHOUT LONG-TERM CURRENT USE OF INSULIN (H): Primary | ICD-10-CM

## 2023-07-20 DIAGNOSIS — H10.13 ALLERGIC CONJUNCTIVITIS OF BOTH EYES: ICD-10-CM

## 2023-07-20 DIAGNOSIS — H25.13 NUCLEAR SCLEROSIS OF BOTH EYES: ICD-10-CM

## 2023-07-20 DIAGNOSIS — H52.03 HYPERMETROPIA OF BOTH EYES: ICD-10-CM

## 2023-07-20 PROCEDURE — 92015 DETERMINE REFRACTIVE STATE: CPT | Performed by: OPTOMETRIST

## 2023-07-20 PROCEDURE — 92014 COMPRE OPH EXAM EST PT 1/>: CPT | Performed by: OPTOMETRIST

## 2023-07-20 RX ORDER — AZELASTINE HYDROCHLORIDE 0.5 MG/ML
1 SOLUTION/ DROPS OPHTHALMIC 2 TIMES DAILY
Qty: 6 ML | Refills: 11 | Status: SHIPPED | OUTPATIENT
Start: 2023-07-20 | End: 2023-08-03

## 2023-07-20 ASSESSMENT — REFRACTION_WEARINGRX
OD_AXIS: 167
OS_CYLINDER: +0.25
SPECS_TYPE: OTC  READERS
OS_SPHERE: +1.00
OD_CYLINDER: +0.25
OD_ADD: +2.25
OS_ADD: +2.25
OS_AXIS: 045
OD_SPHERE: +1.50

## 2023-07-20 ASSESSMENT — KERATOMETRY
OS_K2POWER_DIOPTERS: 42.75
OS_AXISANGLE2_DEGREES: 153
OD_AXISANGLE2_DEGREES: 015
OD_K1POWER_DIOPTERS: 41.75
OS_AXISANGLE_DEGREES: 063
OS_K1POWER_DIOPTERS: 42.25
OD_K2POWER_DIOPTERS: 42.00
OD_AXISANGLE_DEGREES: 105

## 2023-07-20 ASSESSMENT — REFRACTION_MANIFEST
OD_SPHERE: +2.00
METHOD_AUTOREFRACTION: 1
OS_CYLINDER: +0.25
OD_ADD: +2.25
OD_CYLINDER: SPHERE
OS_ADD: +2.25
OS_SPHERE: +1.00
OS_AXIS: 045

## 2023-07-20 ASSESSMENT — VISUAL ACUITY
METHOD: SNELLEN - LINEAR
OS_SC: 20/70
OS_PH_SC: 20/20
OD_SC: 20/70
OS_SC: 20/30
OD_SC: 20/70-1
OD_PH_SC: 20/20
OS_PH_SC+: -2
OD_PH_SC+: -1

## 2023-07-20 ASSESSMENT — CONF VISUAL FIELD
OD_SUPERIOR_TEMPORAL_RESTRICTION: 0
OS_INFERIOR_NASAL_RESTRICTION: 0
OS_SUPERIOR_NASAL_RESTRICTION: 0
OD_SUPERIOR_NASAL_RESTRICTION: 0
OD_NORMAL: 1
OD_INFERIOR_NASAL_RESTRICTION: 0
OS_NORMAL: 1
OD_INFERIOR_TEMPORAL_RESTRICTION: 0
OS_SUPERIOR_TEMPORAL_RESTRICTION: 0
OS_INFERIOR_TEMPORAL_RESTRICTION: 0

## 2023-07-20 ASSESSMENT — PACHYMETRY
OD_CT(UM): 530
OS_CT(UM): 520

## 2023-07-20 ASSESSMENT — SLIT LAMP EXAM - LIDS
COMMENTS: NORMAL
COMMENTS: NORMAL

## 2023-07-20 ASSESSMENT — EXTERNAL EXAM - LEFT EYE: OS_EXAM: NORMAL

## 2023-07-20 ASSESSMENT — EXTERNAL EXAM - RIGHT EYE: OD_EXAM: NORMAL

## 2023-07-20 ASSESSMENT — TONOMETRY
OS_IOP_MMHG: 14
IOP_METHOD: APPLANATION
OD_IOP_MMHG: 15

## 2023-07-20 NOTE — LETTER
"    7/20/2023         RE: Geovany Marte  [address]  [facility] [address]        Dear Colleague,    Thank you for referring your patient, Geovany Marte, to the [facility]. Please see a copy of my visit note below.    Chief Complaint   Patient presents with     Diabetic Eye Exam     Accompanied by wife  Chief Complaint(s) and History of Present Illness(es)     Diabetic Eye Exam            Vision: is stable    Diabetes Type: Type 2 and taking oral medications    Duration: 10 years    Blood Sugars: is controlled               Lab Results   Component Value Date    A1C 8.3 03/31/2023    A1C 8.2 12/09/2022    A1C 7.9 07/06/2022    A1C 7.5 01/31/2022    A1C 7.0 07/09/2021    A1C 8.1 01/11/2021    A1C 7.1 09/28/2020    A1C 6.2 04/16/2020    A1C 6.3 10/30/2019            Last Eye Exam: 7-  Dilated Previously: Yes    What are you currently using to see?  [employer]    Distance Vision Acuity: Noticed gradual change in both eyes    Near Vision Acuity: Satisfied with vision while reading  with otc readers    Eye Comfort: good  Do you use eye drops? : Yes: optivar if itchy  Occupation or Hobbies: [employer]     Has been followed as a glaucoma suspect.  Last visit with Dr. Louie at the [facility]    9/10/2020  Copied from that visit.    \"Geovany Marte is a 54 year old male who presents with:     Glaucoma suspect of both eyes Last testing 10/2017. Intraocular pressure 18/16 today with thinner pachymetry (530/520).      OCT optic nerve: avg retinal nerve fiber layer 96/93. Mild thinning of the superior quadrant in the left eye - watch closely.      Logan visual field (HVF) 24-2: within normal limits right eye; ?start of superior nasal step left eye (doesn't correspond with OCT).     Monitor closely.         Plan:  Continue monitoring for glaucoma suspicion     Return in 1 year for repeat testing     Jason Louie MD \"    Patient tried to call and schedule but was " told there was no referral so he couldn't.     Birgit Vargas Optometric Assistant, A.B.OSaeidC.     Medical, surgical and family histories reviewed and updated 7/20/2023.       OBJECTIVE: See Ophthalmology exam    ASSESSMENT:    ICD-10-CM    1. Type 2 diabetes mellitus without complication, without long-term current use of insulin (H)  E11.9     Negative diabetic retinopathy both eyes.      2. Nuclear sclerosis of both eyes  H25.13       3. Allergic conjunctivitis of both eyes  H10.13       4. Glaucoma suspect of both eyes  H40.003       5. Hypermetropia of both eyes  H52.03       6. Presbyopia  H52.4           PLAN:    Geovany Marte aware  eye exam results will be sent to Mona Lin.  Patient Instructions   There are not any signs of the diabetes affecting the eyes today.  It is important that you get your eyes dilated once yearly and keep good control of your diabetes.    You have the start of mild cataracts.  You may notice some blurred vision or glare with night driving.  It is important that you wear good sunglasses to protect your eyes from the ultraviolet light from the sun.  I recommend that you return in 1 year for an eye exam unless there are any sudden changes in your vision.       Optivar- 1 drop both eyes 2 x day as needed for itchy eyes.    Referral to [facility] ophthalmology- Dr. Louie for follow up glaucoma testing.    Eyeglass prescription given.     Return in 1 year for a complete eye exam or sooner if needed.    Yoseph Ortiz, SHIRA                 Again, thank you for allowing me to participate in the care of your patient.        Sincerely,        Yoseph Ortiz, OD

## 2023-07-20 NOTE — PATIENT INSTRUCTIONS
There are not any signs of the diabetes affecting the eyes today.  It is important that you get your eyes dilated once yearly and keep good control of your diabetes.    You have the start of mild cataracts.  You may notice some blurred vision or glare with night driving.  It is important that you wear good sunglasses to protect your eyes from the ultraviolet light from the sun.  I recommend that you return in 1 year for an eye exam unless there are any sudden changes in your vision.       Optivar- 1 drop both eyes 2 x day as needed for itchy eyes.    Referral to [facility] ophthalmology- Dr. Louie for follow up glaucoma testing.    Eyeglass prescription given.     Return in 1 year for a complete eye exam or sooner if needed.    Yoseph Ortiz, OD    The affects of the dilating drops last for 4- 6 hours.  You will be more sensitive to light and vision will be blurry up close.  Do not drive if you do not feel comfortable.  Mydriatic sunglasses were given if needed.    Patient Education   Diabetes weakens the blood vessels all over the body, including the eyes. Damage to the blood vessels in the eyes can cause swelling or bleeding into part of the eye (called the retina). This is called diabetic retinopathy (OLGA-tin-AH-puh-thee). If not treated, this disease can cause vision loss or blindness.   Symptoms may include blurred or distorted vision, but many people have no symptoms. It's important to see your eye doctor regularly to check for problems.   Early treatment and good control can help protect your vision. Here are the things you can do to help prevent vision loss:      1. Keep your blood sugar levels under tight control.      2. Bring high blood pressure under control.      3. No smoking.      4. Have yearly dilated eye exams.       Optometry Providers       Clinic Locations                                 Telephone Number   Dr. Marleny Youssef Dr.  Beatriz Cui   [facility]  Rob [phone]     [facility] Hours:                [facility] Hours:       [facility] Hours:   [address]   [address]     [address] [facility]  [place] [address]   [place] [address]    [place] [address]  Phone: [phone]                    Phone: [phone]     Phone: [phone]                      Monday 8:00-6:00                          Monday 8:00-6:00                          Monday 8:00-6:00              Tuesday 8:00-6:00                          Tuesday 8:00-6:00                          Tuesday 8:00-6:00              Wednesday 8:00-6:00                  Wednesday 8:00-6:00                   Wednesday 8:00-6:00      Thursday 8:00-6:00                        Thursday 8:00-6:00                         Thursday 8:00-6:00            Friday 8:00-5:00                              Friday 8:00-5:00                              Friday 8:00-5:00    [facility] Hours:   [address] [place], [place] [id number] [phone]    Monday 9:00-6:00  Tuesday 9:00-6:00  Wednesday 9:00-6:00  Thursday 9:00-6:00  Friday 9:00-5:00  As always, Thank you for trusting us with your health care needs!

## 2023-07-20 NOTE — PROGRESS NOTES
"Chief Complaint   Patient presents with     Diabetic Eye Exam     Accompanied by wife  Chief Complaint(s) and History of Present Illness(es)     Diabetic Eye Exam            Vision: is stable    Diabetes Type: Type 2 and taking oral medications    Duration: 10 years    Blood Sugars: is controlled               Lab Results   Component Value Date    A1C 8.3 03/31/2023    A1C 8.2 12/09/2022    A1C 7.9 07/06/2022    A1C 7.5 01/31/2022    A1C 7.0 07/09/2021    A1C 8.1 01/11/2021    A1C 7.1 09/28/2020    A1C 6.2 04/16/2020    A1C 6.3 10/30/2019            Last Eye Exam: 7-  Dilated Previously: Yes    What are you currently using to see?  otc readers    Distance Vision Acuity: Noticed gradual change in both eyes    Near Vision Acuity: Satisfied with vision while reading  with otc readers    Eye Comfort: good  Do you use eye drops? : Yes: optivar if itchy  Occupation or Hobbies: sales     Has been followed as a glaucoma suspect.  Last visit with Dr. Louie at the [facility]    9/10/2020  Copied from that visit.    \"Geovany Marte is a 54 year old male who presents with:     Glaucoma suspect of both eyes Last testing 10/2017. Intraocular pressure 18/16 today with thinner pachymetry (530/520).      OCT optic nerve: avg retinal nerve fiber layer 96/93. Mild thinning of the superior quadrant in the left eye - watch closely.      Logan visual field (HVF) 24-2: within normal limits right eye; ?start of superior nasal step left eye (doesn't correspond with OCT).     Monitor closely.         Plan:  Continue monitoring for glaucoma suspicion     Return in 1 year for repeat testing     Jason Louie MD \"    Patient tried to call and schedule but was told there was no referral so he couldn't.     Birgit Vargas Optometric Assistant, A.B.O.C.     Medical, surgical and family histories reviewed and updated 7/20/2023.       OBJECTIVE: See Ophthalmology exam    ASSESSMENT:    ICD-10-CM    1. Type 2 diabetes " mellitus without complication, without long-term current use of insulin (H)  E11.9     Negative diabetic retinopathy both eyes.      2. Nuclear sclerosis of both eyes  H25.13       3. Allergic conjunctivitis of both eyes  H10.13       4. Glaucoma suspect of both eyes  H40.003       5. Hypermetropia of both eyes  H52.03       6. Presbyopia  H52.4           PLAN:    Geovany Marte aware  eye exam results will be sent to Mona Lin.  Patient Instructions   There are not any signs of the diabetes affecting the eyes today.  It is important that you get your eyes dilated once yearly and keep good control of your diabetes.    You have the start of mild cataracts.  You may notice some blurred vision or glare with night driving.  It is important that you wear good sunglasses to protect your eyes from the ultraviolet light from the sun.  I recommend that you return in 1 year for an eye exam unless there are any sudden changes in your vision.       Optivar- 1 drop both eyes 2 x day as needed for itchy eyes.    Referral to [facility] ophthalmology- Dr. Louie for follow up glaucoma testing.    Eyeglass prescription given.     Return in 1 year for a complete eye exam or sooner if needed.    Yoseph Ortiz, OD

## 2023-08-01 ENCOUNTER — TELEPHONE (OUTPATIENT)
Dept: FAMILY MEDICINE | Facility: CLINIC | Age: 58
End: 2023-08-01
Payer: COMMERCIAL

## 2023-08-01 NOTE — TELEPHONE ENCOUNTER
Reason for Call:  Appointment Request    Patient requesting this type of appt: Chronic Diease Management/Medication/Follow-Up    Requested provider: Mona Lin    Reason patient unable to be scheduled: Not within requested timeframe    When does patient want to be seen/preferred time:  August or September    Comments: Pt needs a diabetic check, earliest appt is 10/30, pt is scheduled for that day but was hoping for something in August or September if possible. Please contact pt if he can be see with Tiffanie Scales.     Could we send this information to you in [employer] or would you prefer to receive a phone call?:   Patient would like to be contacted via [employer]    Call taken on 8/1/2023 at 9:21 AM by Lilian Deal

## 2023-08-03 DIAGNOSIS — H10.13 ALLERGIC CONJUNCTIVITIS OF BOTH EYES: ICD-10-CM

## 2023-08-04 RX ORDER — AZELASTINE HYDROCHLORIDE 0.5 MG/ML
1 SOLUTION/ DROPS OPHTHALMIC 2 TIMES DAILY
Qty: 6 ML | Refills: 11 | Status: SHIPPED | OUTPATIENT
Start: 2023-08-04

## 2023-08-28 ENCOUNTER — OFFICE VISIT (OUTPATIENT)
Dept: FAMILY MEDICINE | Facility: CLINIC | Age: 58
End: 2023-08-28
Payer: COMMERCIAL

## 2023-08-28 VITALS
HEIGHT: 73 IN | BODY MASS INDEX: 41.75 KG/M2 | TEMPERATURE: 98.7 F | HEART RATE: 84 BPM | SYSTOLIC BLOOD PRESSURE: 134 MMHG | OXYGEN SATURATION: 96 % | WEIGHT: 315 LBS | RESPIRATION RATE: 12 BRPM | DIASTOLIC BLOOD PRESSURE: 81 MMHG

## 2023-08-28 DIAGNOSIS — Z23 NEED FOR DIPHTHERIA-TETANUS-PERTUSSIS (TDAP) VACCINE: ICD-10-CM

## 2023-08-28 DIAGNOSIS — I10 HYPERTENSION GOAL BP (BLOOD PRESSURE) < 140/90: Primary | ICD-10-CM

## 2023-08-28 DIAGNOSIS — E11.65 TYPE 2 DIABETES MELLITUS WITH HYPERGLYCEMIA, WITHOUT LONG-TERM CURRENT USE OF INSULIN (H): ICD-10-CM

## 2023-08-28 DIAGNOSIS — Z23 NEED FOR HEPATITIS B BOOSTER VACCINATION: ICD-10-CM

## 2023-08-28 DIAGNOSIS — R30.0 DYSURIA: ICD-10-CM

## 2023-08-28 LAB
ALBUMIN UR-MCNC: NEGATIVE MG/DL
ANION GAP SERPL CALCULATED.3IONS-SCNC: 13 MMOL/L (ref 7–15)
APPEARANCE UR: CLEAR
BILIRUB UR QL STRIP: NEGATIVE
BUN SERPL-MCNC: 11.3 MG/DL (ref 8–23)
CALCIUM SERPL-MCNC: 9.4 MG/DL (ref 8.6–10)
CHLORIDE SERPL-SCNC: 101 MMOL/L (ref 98–107)
COLOR UR AUTO: YELLOW
CREAT SERPL-MCNC: 0.93 MG/DL (ref 0.67–1.17)
DEPRECATED HCO3 PLAS-SCNC: 24 MMOL/L (ref 22–29)
GFR SERPL CREATININE-BSD FRML MDRD: >90 ML/MIN/1.73M2
GLUCOSE SERPL-MCNC: 107 MG/DL (ref 70–99)
GLUCOSE UR STRIP-MCNC: NEGATIVE MG/DL
HBA1C MFR BLD: 6.7 % (ref 0–5.6)
HGB UR QL STRIP: NEGATIVE
KETONES UR STRIP-MCNC: 15 MG/DL
LEUKOCYTE ESTERASE UR QL STRIP: NEGATIVE
NITRATE UR QL: NEGATIVE
PH UR STRIP: 5.5 [PH] (ref 5–7)
POTASSIUM SERPL-SCNC: 3.9 MMOL/L (ref 3.4–5.3)
SODIUM SERPL-SCNC: 138 MMOL/L (ref 136–145)
SP GR UR STRIP: 1.02 (ref 1–1.03)
UROBILINOGEN UR STRIP-ACNC: 0.2 E.U./DL

## 2023-08-28 PROCEDURE — 36415 COLL VENOUS BLD VENIPUNCTURE: CPT | Performed by: FAMILY MEDICINE

## 2023-08-28 PROCEDURE — 99214 OFFICE O/P EST MOD 30 MIN: CPT | Mod: 25 | Performed by: FAMILY MEDICINE

## 2023-08-28 PROCEDURE — 90471 IMMUNIZATION ADMIN: CPT | Performed by: FAMILY MEDICINE

## 2023-08-28 PROCEDURE — 83036 HEMOGLOBIN GLYCOSYLATED A1C: CPT | Performed by: FAMILY MEDICINE

## 2023-08-28 PROCEDURE — 90472 IMMUNIZATION ADMIN EACH ADD: CPT | Performed by: FAMILY MEDICINE

## 2023-08-28 PROCEDURE — 81003 URINALYSIS AUTO W/O SCOPE: CPT | Performed by: FAMILY MEDICINE

## 2023-08-28 PROCEDURE — 90746 HEPB VACCINE 3 DOSE ADULT IM: CPT | Performed by: FAMILY MEDICINE

## 2023-08-28 PROCEDURE — 90715 TDAP VACCINE 7 YRS/> IM: CPT | Performed by: FAMILY MEDICINE

## 2023-08-28 PROCEDURE — 80048 BASIC METABOLIC PNL TOTAL CA: CPT | Performed by: FAMILY MEDICINE

## 2023-08-28 RX ORDER — HYDROCHLOROTHIAZIDE 12.5 MG/1
12.5 TABLET ORAL EVERY MORNING
Qty: 90 TABLET | Refills: 1 | Status: SHIPPED | OUTPATIENT
Start: 2023-08-28 | End: 2024-01-22

## 2023-08-28 NOTE — PROGRESS NOTES
"  Assessment & Plan     Hypertension goal BP (blood pressure) < 140/90  Controlled - continue current treatmen  - BASIC METABOLIC PANEL; Future  - hydrochlorothiazide (HYDRODIURIL) 12.5 MG tablet; Take 1 tablet (12.5 mg) by mouth every morning    Type 2 diabetes mellitus with hyperglycemia, without long-term current use of insulin (H)  Uncertain control - check lab and increase ozempic if A1c > 7  - Hemoglobin A1c; Future    Dysuria  Check UA  - UA Macroscopic with reflex to Microscopic and Culture - Lab Collect; Future    Need for diphtheria-tetanus-pertussis (Tdap) vaccine    - TDAP 10-64Y (ADACEL,BOOSTRIX)    Need for hepatitis B booster vaccination    - HEPATITIS B VACCINE ADULT 3 DOSE IM (ENGERIX-B/RECOMBIVAX HB)       Nicotine/Tobacco Cessation:  He reports that he has been smoking cigars and cigarettes. He has a 3.00 pack-year smoking history. He has never used smokeless tobacco.  Nicotine/Tobacco Cessation Plan:   Information offered: Patient not interested at this time      BMI:   Estimated body mass index is 42.64 kg/m  as calculated from the following:    Height as of this encounter: 1.854 m (6' 1\").    Weight as of this encounter: 146.6 kg (323 lb 3.2 oz).   Weight management plan: Discussed healthy diet and exercise guidelines      The uses and side effects, including black box warnings as appropriate, were discussed in detail.  All patient questions were answered.  The patient was instructed to call immediately if any side effects developed.     Mona Scales MD  [facility] BRENNA Armenta is a 57 year old, presenting for the following health issues:  Diabetes        8/28/2023     1:31 PM   Additional Questions   Roomed by lyubov   Accompanied by self         8/28/2023     1:31 PM   Patient Reported Additional Medications   Patient reports taking the following new medications no       History of Present Illness       Diabetes:   He presents for follow up of " "diabetes.  He is checking home blood glucose one time daily.   He checks blood glucose before meals.  Blood glucose is never over 200 and never under 70. He is aware of hypoglycemia symptoms including shakiness.    He has no concerns regarding his diabetes at this time.   He is not experiencing numbness or burning in feet, excessive thirst, blurry vision, weight changes or redness, sores or blisters on feet.           He eats 2-3 servings of fruits and vegetables daily.He consumes 1 sweetened beverage(s) daily.He exercises with enough effort to increase his heart rate 10 to 19 minutes per day.  He exercises with enough effort to increase his heart rate 3 or less days per week.   He is taking medications regularly.           Review of Systems   Constitutional, HEENT, cardiovascular, pulmonary, gi and gu systems are negative, except as otherwise noted.      Objective    /81 (BP Location: Right arm, Patient Position: Sitting, Cuff Size: Adult Large)   Pulse 84   Temp 98.7  F (37.1  C) (Oral)   Resp 12   Ht 1.854 m (6' 1\")   Wt 146.6 kg (323 lb 3.2 oz)   SpO2 96%   BMI 42.64 kg/m    Body mass index is 42.64 kg/m .  Physical Exam   GENERAL: healthy, alert, no distress, and obese  NECK: no adenopathy, no asymmetry, masses, or scars and thyroid normal to palpation  RESP: lungs clear to auscultation - no rales, rhonchi or wheezes  CV: regular rate and rhythm, normal S1 S2, no S3 or S4, no murmur, click or rub, no peripheral edema and peripheral pulses strong  ABDOMEN: soft, nontender, no hepatosplenomegaly, no masses and bowel sounds normal  MS: no gross musculoskeletal defects noted, no edema  PSYCH: mentation appears normal, affect normal/bright  Diabetic foot exam: normal DP and PT pulses, no trophic changes or ulcerative lesions, normal sensory exam, and normal monofilament exam                "

## 2023-08-29 DIAGNOSIS — E66.01 MORBID OBESITY (H): ICD-10-CM

## 2023-08-29 DIAGNOSIS — E11.65 TYPE 2 DIABETES MELLITUS WITH HYPERGLYCEMIA, WITHOUT LONG-TERM CURRENT USE OF INSULIN (H): ICD-10-CM

## 2023-08-30 RX ORDER — SEMAGLUTIDE 1.34 MG/ML
INJECTION, SOLUTION SUBCUTANEOUS
Qty: 9 ML | Refills: 1 | Status: SHIPPED | OUTPATIENT
Start: 2023-08-30 | End: 2024-02-16

## 2023-09-01 NOTE — RESULT ENCOUNTER NOTE
Mr. Marte,    All of your labs were normal for you. Continue your current medications and follow up in 6 months.    Please contact the clinic if you have additional questions.  Thank you.    Sincerely,    Mona Scales MD

## 2023-09-07 ENCOUNTER — OFFICE VISIT (OUTPATIENT)
Dept: OPHTHALMOLOGY | Facility: CLINIC | Age: 58
End: 2023-09-07
Attending: OPTOMETRIST
Payer: COMMERCIAL

## 2023-09-07 DIAGNOSIS — H40.003 GLAUCOMA SUSPECT OF BOTH EYES: ICD-10-CM

## 2023-09-07 PROCEDURE — 92133 CPTRZD OPH DX IMG PST SGM ON: CPT | Performed by: STUDENT IN AN ORGANIZED HEALTH CARE EDUCATION/TRAINING PROGRAM

## 2023-09-07 PROCEDURE — 92012 INTRM OPH EXAM EST PATIENT: CPT | Performed by: STUDENT IN AN ORGANIZED HEALTH CARE EDUCATION/TRAINING PROGRAM

## 2023-09-07 PROCEDURE — 92083 EXTENDED VISUAL FIELD XM: CPT | Performed by: STUDENT IN AN ORGANIZED HEALTH CARE EDUCATION/TRAINING PROGRAM

## 2023-09-07 ASSESSMENT — VISUAL ACUITY
METHOD: SNELLEN - LINEAR
OS_SC+: -2
OD_SC: 20/50
OD_SC+: -1
OS_SC: 20/30
OD_PH_SC: 20/20

## 2023-09-07 ASSESSMENT — SLIT LAMP EXAM - LIDS
COMMENTS: NORMAL
COMMENTS: NORMAL

## 2023-09-07 ASSESSMENT — TONOMETRY
IOP_METHOD: APPLANATION
OS_IOP_MMHG: 13
OD_IOP_MMHG: 15

## 2023-09-07 ASSESSMENT — EXTERNAL EXAM - LEFT EYE: OS_EXAM: NORMAL

## 2023-09-07 ASSESSMENT — EXTERNAL EXAM - RIGHT EYE: OD_EXAM: NORMAL

## 2023-09-07 NOTE — PATIENT INSTRUCTIONS
Continue monitoring for glaucoma suspicion    Ok to see Dr. Ortiz for complete eye exams, but return to see me for glaucoma testing in 1 year    Jason Louie MD  [phone]

## 2023-09-07 NOTE — PROGRESS NOTES
Current Eye Medications:  optivar - both eyes PRN itching     Subjective:  here for HVF, OCT and IOP - vision is stable, continues to use OTC readers as needed, less strong ones for distance, stronger ones for near.     Objective:  See Ophthalmology Exam.      Assessment:  Geovany Marte is a 57 year old male who presents with:   Encounter Diagnosis   Name Primary?    Glaucoma suspect of both eyes Intraocular pressure 15/13 today. Continue monitoring.    OCT optic nerve: avg retinal nerve fiber layer 90/93; lots of superior artifact, within normal limits both eyes     Logan visual field (HVF) 24-2: scattered loss right eye; borderline superior loss left eye        Plan:  Continue monitoring for glaucoma suspicion    Ok to see Dr. Ortiz for complete eye exams, but return to see me for glaucoma testing in 1 year    Jason Louie MD  [phone]

## 2023-09-07 NOTE — LETTER
9/7/2023         RE: Geovany Marte  [address]  [place] [address]        Dear Colleague,    Thank you for referring your patient, Geovany Marte, to the [facility]. Please see a copy of my visit note below.     Current Eye Medications:  optivar - both eyes PRN itching     Subjective:  here for HVF, OCT and IOP - vision is stable, continues to use OTC readers as needed, less strong ones for distance, stronger ones for near.     Objective:  See Ophthalmology Exam.      Assessment:  Geovany Marte is a 57 year old male who presents with:   Encounter Diagnosis   Name Primary?     Glaucoma suspect of both eyes Intraocular pressure 15/13 today. Continue monitoring.    OCT optic nerve: avg retinal nerve fiber layer 90/93; lots of superior artifact, within normal limits both eyes     Logan visual field (HVF) 24-2: scattered loss right eye; borderline superior loss left eye        Plan:  Continue monitoring for glaucoma suspicion    Ok to see Dr. Ortiz for complete eye exams, but return to see me for glaucoma testing in 1 year    Jason Louie MD  [phone]        Again, thank you for allowing me to participate in the care of your patient.        Sincerely,        Jason Louie MD

## 2023-09-30 DIAGNOSIS — I10 HYPERTENSION GOAL BP (BLOOD PRESSURE) < 140/90: ICD-10-CM

## 2023-10-02 RX ORDER — AMLODIPINE BESYLATE 10 MG/1
TABLET ORAL
Qty: 90 TABLET | Refills: 2 | Status: SHIPPED | OUTPATIENT
Start: 2023-10-02 | End: 2024-06-25

## 2023-10-30 DIAGNOSIS — I10 HYPERTENSION GOAL BP (BLOOD PRESSURE) < 140/90: ICD-10-CM

## 2023-10-31 RX ORDER — CARVEDILOL 25 MG/1
TABLET ORAL
Qty: 180 TABLET | Refills: 3 | Status: SHIPPED | OUTPATIENT
Start: 2023-10-31 | End: 2024-09-20

## 2024-01-20 DIAGNOSIS — I10 HYPERTENSION GOAL BP (BLOOD PRESSURE) < 140/90: ICD-10-CM

## 2024-01-22 RX ORDER — HYDROCHLOROTHIAZIDE 12.5 MG/1
12.5 TABLET ORAL EVERY MORNING
Qty: 90 TABLET | Refills: 3 | Status: SHIPPED | OUTPATIENT
Start: 2024-01-22

## 2024-02-15 DIAGNOSIS — E11.65 TYPE 2 DIABETES MELLITUS WITH HYPERGLYCEMIA, WITHOUT LONG-TERM CURRENT USE OF INSULIN (H): ICD-10-CM

## 2024-02-15 DIAGNOSIS — E66.01 MORBID OBESITY (H): ICD-10-CM

## 2024-02-16 RX ORDER — SEMAGLUTIDE 1.34 MG/ML
INJECTION, SOLUTION SUBCUTANEOUS
Qty: 9 ML | Refills: 3 | Status: SHIPPED | OUTPATIENT
Start: 2024-02-16

## 2024-02-17 DIAGNOSIS — E78.5 HYPERLIPIDEMIA LDL GOAL <100: ICD-10-CM

## 2024-02-19 RX ORDER — PRAVASTATIN SODIUM 80 MG/1
TABLET ORAL
Qty: 90 TABLET | Refills: 3 | Status: SHIPPED | OUTPATIENT
Start: 2024-02-19

## 2024-02-28 ENCOUNTER — TELEPHONE (OUTPATIENT)
Dept: FAMILY MEDICINE | Facility: CLINIC | Age: 59
End: 2024-02-28
Payer: COMMERCIAL

## 2024-02-28 DIAGNOSIS — I10 HYPERTENSION GOAL BP (BLOOD PRESSURE) < 140/90: ICD-10-CM

## 2024-02-28 DIAGNOSIS — E11.9 TYPE 2 DIABETES, HBA1C GOAL < 7% (H): Primary | ICD-10-CM

## 2024-02-28 NOTE — TELEPHONE ENCOUNTER
Reason for Call:  Other appointment    Detailed comments: pt needing to be seen for diab check - pcp booking out till June - pt needing to be seen sooner    Phone Number Patient can be reached at: Cell number on file:    Telephone Information:   Mobile [phone]       Best Time: any    Can we leave a detailed message on this number? Not Applicable    Call taken on 2/28/2024 at 9:17 AM by Fraa Lamas

## 2024-02-29 NOTE — TELEPHONE ENCOUNTER
Patient is scheduled for a Lab Only appointment on 3/13/2024 7:45 am and the Virtual visit is scheduled for 3/18/2024 at 11:40 am. Please place lab orders.  Gabriela Anthony MA  [facility]   Primary Care

## 2024-03-13 ENCOUNTER — PATIENT OUTREACH (OUTPATIENT)
Dept: CARE COORDINATION | Facility: CLINIC | Age: 59
End: 2024-03-13

## 2024-03-13 ENCOUNTER — LAB (OUTPATIENT)
Dept: LAB | Facility: CLINIC | Age: 59
End: 2024-03-13
Payer: COMMERCIAL

## 2024-03-13 DIAGNOSIS — I10 HYPERTENSION GOAL BP (BLOOD PRESSURE) < 140/90: ICD-10-CM

## 2024-03-13 DIAGNOSIS — C61 PROSTATE CANCER (H): ICD-10-CM

## 2024-03-13 DIAGNOSIS — E11.9 TYPE 2 DIABETES, HBA1C GOAL < 7% (H): ICD-10-CM

## 2024-03-13 LAB
ANION GAP SERPL CALCULATED.3IONS-SCNC: 9 MMOL/L (ref 7–15)
BUN SERPL-MCNC: 14 MG/DL (ref 8–23)
CALCIUM SERPL-MCNC: 9.6 MG/DL (ref 8.8–10.2)
CHLORIDE SERPL-SCNC: 100 MMOL/L (ref 98–107)
CHOLEST SERPL-MCNC: 170 MG/DL
CREAT SERPL-MCNC: 0.93 MG/DL (ref 0.67–1.17)
CREAT UR-MCNC: 209 MG/DL
DEPRECATED HCO3 PLAS-SCNC: 26 MMOL/L (ref 22–29)
EGFRCR SERPLBLD CKD-EPI 2021: >90 ML/MIN/1.73M2
FASTING STATUS PATIENT QL REPORTED: YES
GLUCOSE SERPL-MCNC: 136 MG/DL (ref 70–99)
HBA1C MFR BLD: 6.4 % (ref 0–5.6)
HDLC SERPL-MCNC: 46 MG/DL
LDLC SERPL CALC-MCNC: 100 MG/DL
MICROALBUMIN UR-MCNC: <12 MG/L
MICROALBUMIN/CREAT UR: NORMAL MG/G{CREAT}
NONHDLC SERPL-MCNC: 124 MG/DL
POTASSIUM SERPL-SCNC: 4.1 MMOL/L (ref 3.4–5.3)
PSA SERPL DL<=0.01 NG/ML-MCNC: 5.84 NG/ML (ref 0–4.5)
SODIUM SERPL-SCNC: 135 MMOL/L (ref 135–145)
TRIGL SERPL-MCNC: 118 MG/DL

## 2024-03-13 PROCEDURE — 84153 ASSAY OF PSA TOTAL: CPT

## 2024-03-13 PROCEDURE — 36415 COLL VENOUS BLD VENIPUNCTURE: CPT

## 2024-03-13 PROCEDURE — 80061 LIPID PANEL: CPT

## 2024-03-13 PROCEDURE — 82043 UR ALBUMIN QUANTITATIVE: CPT

## 2024-03-13 PROCEDURE — 83036 HEMOGLOBIN GLYCOSYLATED A1C: CPT

## 2024-03-13 PROCEDURE — 80048 BASIC METABOLIC PNL TOTAL CA: CPT

## 2024-03-13 PROCEDURE — 82570 ASSAY OF URINE CREATININE: CPT

## 2024-03-13 NOTE — PROGRESS NOTES
Clinic Care Coordination Contact  Program:   [facility]: [place]   Renewal: [facility]  Date Applied:      JULIANNA Outreach:   3/13/24:  1st outreach attempt. Left a message on voicemail with call back information and requested return call.  Plan: CTA will call again within 2 weeks.  Maria Victoria Whalen  Care   [facility]  Clinic Care Coordination  [phone]       Health Insurance:        Referral/Screening:

## 2024-03-18 ENCOUNTER — VIRTUAL VISIT (OUTPATIENT)
Dept: FAMILY MEDICINE | Facility: CLINIC | Age: 59
End: 2024-03-18
Payer: COMMERCIAL

## 2024-03-18 DIAGNOSIS — N52.1 ERECTILE DYSFUNCTION DUE TO DISEASES CLASSIFIED ELSEWHERE: ICD-10-CM

## 2024-03-18 DIAGNOSIS — C61 PROSTATE CANCER (H): ICD-10-CM

## 2024-03-18 DIAGNOSIS — E66.01 MORBID OBESITY (H): ICD-10-CM

## 2024-03-18 DIAGNOSIS — E11.65 TYPE 2 DIABETES MELLITUS WITH HYPERGLYCEMIA, WITHOUT LONG-TERM CURRENT USE OF INSULIN (H): Primary | ICD-10-CM

## 2024-03-18 PROCEDURE — 99441 PR PHYSICIAN TELEPHONE EVALUATION 5-10 MIN: CPT | Performed by: FAMILY MEDICINE

## 2024-03-18 RX ORDER — SILDENAFIL 100 MG/1
TABLET, FILM COATED ORAL
Qty: 36 TABLET | Refills: 3 | Status: SHIPPED | OUTPATIENT
Start: 2024-03-18

## 2024-03-18 ASSESSMENT — ASTHMA QUESTIONNAIRES
QUESTION_3 LAST FOUR WEEKS HOW OFTEN DID YOUR ASTHMA SYMPTOMS (WHEEZING, COUGHING, SHORTNESS OF BREATH, CHEST TIGHTNESS OR PAIN) WAKE YOU UP AT NIGHT OR EARLIER THAN USUAL IN THE MORNING: NOT AT ALL
QUESTION_4 LAST FOUR WEEKS HOW OFTEN HAVE YOU USED YOUR RESCUE INHALER OR NEBULIZER MEDICATION (SUCH AS ALBUTEROL): NOT AT ALL
QUESTION_1 LAST FOUR WEEKS HOW MUCH OF THE TIME DID YOUR ASTHMA KEEP YOU FROM GETTING AS MUCH DONE AT WORK, SCHOOL OR AT HOME: A LITTLE OF THE TIME
QUESTION_2 LAST FOUR WEEKS HOW OFTEN HAVE YOU HAD SHORTNESS OF BREATH: NOT AT ALL
ACT_TOTALSCORE: 23
ACT_TOTALSCORE: 23
QUESTION_5 LAST FOUR WEEKS HOW WOULD YOU RATE YOUR ASTHMA CONTROL: WELL CONTROLLED

## 2024-03-18 NOTE — PROGRESS NOTES
"    Instructions Relayed to Patient by Virtual Roomer:     Patient is active on GazeHawk:   Relayed following to patient: \"It looks like you are active on GazeHawk, are you able to join the visit this way? If not, do you need us to send you a link now or would you like your provider to send a link via text or email when they are ready to initiate the visit?\"    Reminded patient to ensure they were logged on to virtual visit by arrival time listed. Documented in appointment notes if patient had flexibility to initiate visit sooner than arrival time. If pediatric virtual visit, ensured pediatric patient along with parent/guardian will be present for video visit.     Patient offered the website [email or web address] and/or phone number to GazeHawk Help line: [phone]      Geovany is a 58 year old who is being evaluated via a billable telephone visit.    What phone number would you like to be contacted at? [phone]   How would you like to obtain your AVS? [employer]  Originating Location (pt. Location): Home    Distant Location (provider location):  Off-site    Assessment & Plan     Type 2 diabetes mellitus with hyperglycemia, without long-term current use of insulin (H)  Controlled - continue current treatment and follow up in 6 months    Morbid obesity (H)  Improved - continue ozempic    Prostate cancer (H)  Continue per urology    Erectile dysfunction due to diseases classified elsewhere  Trial if covered by insurance.  - sildenafil (VIAGRA) 100 MG tablet; Take 0.5-1 tablets ( mg) by mouth daily as needed for erectile dysfunction Take 30 min to 4 hours before intercourse.  Never use with nitroglycerin, terazosin or doxazosin.      BMI  Estimated body mass index is 42.64 kg/m  as calculated from the following:    Height as of 8/28/23: 1.854 m (6' 1\").    Weight as of 8/28/23: 146.6 kg (323 lb 3.2 oz).   Weight management plan: Discussed healthy diet and exercise guidelines          Subjective "   Geovany is a 58 year old, presenting for the following health issues:  Results        3/18/2024    10:09 AM   Additional Questions   Roomed by Tshia     History of Present Illness       Reason for visit:  Lab results    He eats 2-3 servings of fruits and vegetables daily.He consumes 0 sweetened beverage(s) daily.He exercises with enough effort to increase his heart rate 20 to 29 minutes per day.  He exercises with enough effort to increase his heart rate 3 or less days per week.   He is taking medications regularly.       Chronic conditions - stable with current medication. No side effects.            Objective    Vitals - Patient Reported  Weight (Patient Reported): 142 kg (313 lb)      Vitals:  No vitals were obtained today due to virtual visit.    Physical Exam   General: Alert and no distress //Respiratory: No audible wheeze, cough, or shortness of breath // Psychiatric:  Appropriate affect, tone, and pace of words            Phone call duration: 6 minutes  Signed Electronically by: Mona Scales MD

## 2024-03-27 ENCOUNTER — TELEPHONE (OUTPATIENT)
Dept: FAMILY MEDICINE | Facility: CLINIC | Age: 59
End: 2024-03-27
Payer: COMMERCIAL

## 2024-03-27 ENCOUNTER — MYC MEDICAL ADVICE (OUTPATIENT)
Dept: FAMILY MEDICINE | Facility: CLINIC | Age: 59
End: 2024-03-27
Payer: COMMERCIAL

## 2024-03-27 NOTE — TELEPHONE ENCOUNTER
Prior Authorization Retail Medication Request    Medication/Dose: Ozempic 1 mg/dose, 4 mg/3mL pen  Diagnosis and ICD code (if different than what is on RX):  E66.01, E11.65  New/renewal/insurance change PA/secondary ins. PA:  Previously Tried and Failed:  NA  Rationale:  NA    Insurance   Primary: [facility] - [employer]  Insurance ID:  [id number]    Secondary (if applicable):NA  Insurance ID:  NA    Pharmacy Information (if different than what is on RX)  Name:  [facility]  Phone:  [phone]  Fax:[phone]          CRUZ BradleyN, RN  [facility]

## 2024-03-27 NOTE — TELEPHONE ENCOUNTER
See telephone encounter 3/27/24, writer called over to PA team to make sure it would be appropriate to start a PA on this med since the med is still technically covered by insurance until end of month - PA representative states it should be OK and to route over PA request to team      NAIDA Bradley, RN  [facility]

## 2024-03-28 NOTE — TELEPHONE ENCOUNTER
PA Initiation Key: [id number]    Medication: OZEMPIC (1 MG/DOSE) 4 MG/3ML SC SOPN  Insurance Company: [employer] ([facility]) - Phone [phone] Fax [phone]  Pharmacy Filling the Rx: [facility] - [facility] [address]  Filling Pharmacy Phone: [phone]  Filling Pharmacy Fax: [phone]  Start Date: 3/28/2024

## 2024-03-29 ENCOUNTER — VIRTUAL VISIT (OUTPATIENT)
Dept: UROLOGY | Facility: CLINIC | Age: 59
End: 2024-03-29
Payer: COMMERCIAL

## 2024-03-29 DIAGNOSIS — C61 PROSTATE CANCER (H): Primary | ICD-10-CM

## 2024-03-29 PROCEDURE — 99213 OFFICE O/P EST LOW 20 MIN: CPT | Mod: 95 | Performed by: UROLOGY

## 2024-03-29 ASSESSMENT — PAIN SCALES - GENERAL: PAINLEVEL: NO PAIN (0)

## 2024-03-29 NOTE — LETTER
3/29/2024       RE: Geovany Marte  [address]  [place] [address]     Dear Colleague,    Thank you for referring your patient, Geovany Marte, to the [facility] at [facility]. Please see a copy of my visit note below.    Geovany is a 58 year old who is being evaluated via a billable video visit.    How would you like to obtain your AVS? MyChart  If the video visit is dropped, the invitation should be resent by: Text to cell phone: [phone]  Will anyone else be joining your video visit? No      Assessment & Plan  Problem List Items Addressed This Visit       Prostate cancer (H) - Primary    Relevant Orders    PSA total and free [[id number]]        Review of the result(s) of each unique test - psa  Ordering of each unique test     No follow-ups on file.      Subjective  Geovany is a 58 year old, presenting for the following health issues:  RECHECK    HPI      f/u for prostate cancer group 1 under active observation.  He is without any complaints. Prostate MRI on 2022 was ok.  Recent psa was stable.  He underwent prostate biopsy in 2023 without any cancer found.  Recent psa was 5.86 slightly up.  He has no LUTS.  Prostate size was 68 gm.      Review of Systems  Constitutional, HEENT, cardiovascular, pulmonary, gi and gu systems are negative, except as otherwise noted.      Objective   Vitals - Patient Reported  Pain Score: No Pain (0)        Physical Exam   GENERAL: alert and no distress  EYES: Eyes grossly normal to inspection.  No discharge or erythema, or obvious scleral/conjunctival abnormalities.  RESP: No audible wheeze, cough, or visible cyanosis.    SKIN: Visible skin clear. No significant rash, abnormal pigmentation or lesions.  NEURO: Cranial nerves grossly intact.  Mentation and speech appropriate for age.  PSYCH: Appropriate affect, tone, and pace of words    Prostate cancer: group 1.  Psa slightly up.  Recheck psa in six  months.  BPH:  no LUTS.  Cont observation.      Video-Visit Details    Type of service:  Video Visit   Originating Location (pt. Location): Home    Distant Location (provider location):  On-site  Platform used for Video Visit: Felix  Signed Electronically by: MD Zac Kelsey MD

## 2024-03-29 NOTE — PROGRESS NOTES
Geovany is a 58 year old who is being evaluated via a billable video visit.    How would you like to obtain your AVS? MyChart  If the video visit is dropped, the invitation should be resent by: Text to cell phone: [phone]  Will anyone else be joining your video visit? No      Assessment & Plan   Problem List Items Addressed This Visit       Prostate cancer (H) - Primary    Relevant Orders    PSA total and free [[id number]]        Review of the result(s) of each unique test - psa  Ordering of each unique test           No follow-ups on file.      Subjective   Geovany is a 58 year old, presenting for the following health issues:  RECHECK    HPI      f/u for prostate cancer group 1 under active observation.  He is without any complaints. Prostate MRI on 2022 was ok.  Recent psa was stable.  He underwent prostate biopsy in 2023 without any cancer found.  Recent psa was 5.86 slightly up.  He has no LUTS.  Prostate size was 68 gm.      Review of Systems  Constitutional, HEENT, cardiovascular, pulmonary, gi and gu systems are negative, except as otherwise noted.      Objective    Vitals - Patient Reported  Pain Score: No Pain (0)        Physical Exam   GENERAL: alert and no distress  EYES: Eyes grossly normal to inspection.  No discharge or erythema, or obvious scleral/conjunctival abnormalities.  RESP: No audible wheeze, cough, or visible cyanosis.    SKIN: Visible skin clear. No significant rash, abnormal pigmentation or lesions.  NEURO: Cranial nerves grossly intact.  Mentation and speech appropriate for age.  PSYCH: Appropriate affect, tone, and pace of words    Prostate cancer: group 1.  Psa slightly up.  Recheck psa in six months.  BPH:  no LUTS.  Cont observation.      Video-Visit Details    Type of service:  Video Visit   Originating Location (pt. Location): Home    Distant Location (provider location):  On-site  Platform used for Video Visit: Felix  Signed Electronically by: Zac Ivy MD

## 2024-03-29 NOTE — TELEPHONE ENCOUNTER
Prior Authorization Approval    Medication: OZEMPIC (1 MG/DOSE) 4 MG/3ML SC SOPN  Authorization Effective Date: 3/28/2024  Authorization Expiration Date: 3/28/2025  Approved Dose/Quantity:    Reference #: Key: [id number]   Insurance Company: [employer] ([facility]) - Phone [phone] Fax [phone]  Expected CoPay: $    CoPay Card Available: No    Financial Assistance Needed:    Which Pharm[facility] is filling the prescription: [facility] [address]  Pharmacy Notified: Y  Patient Notified: LEONARD

## 2024-03-29 NOTE — PROGRESS NOTES
"Virtual Visit Details    Type of service:  Video Visit     Originating Location (pt. Location): {video visit patient location:828984::\"Home\"}  {PROVIDER LOCATION On-site should be selected for visits conducted from your clinic location or adjoining [facility] hospital, academic office, or other nearby [facility] building. Off-site should be selected for all other provider locations, including home:[address]}  Distant Location (provider location):  {virtual location provider:133783}  Platform used for Video Visit: {Virtual Visit Platforms:409289::\"[employer]\"}  "

## 2024-04-01 ENCOUNTER — PATIENT OUTREACH (OUTPATIENT)
Dept: CARE COORDINATION | Facility: CLINIC | Age: 59
End: 2024-04-01
Payer: COMMERCIAL

## 2024-04-01 NOTE — PROGRESS NOTES
Clinic Care Coordination Contact  Program:   [facility]: [place]   Renewal: [facility]  Date Applied:      JULIANNA Outreach:   4/1/24: 2nd outreach attempt. Left message on voicemail indicating last outreach attempt. CTA left [facility] number for renewal follow up.  Plan: CTA will no longer make outreach  Maria Victoria Carrillo   [facility]  Clinic Care Coordination  [phone]    3/13/24:  1st outreach attempt. Left a message on voicemail with call back information and requested return call.  Plan: CTA will call again within 2 weeks.  Maria Victoria Carrillo   M [facility]  Clinic Care Coordination  [phone]       Health Insurance:        Referral/Screening:

## 2024-06-14 ENCOUNTER — TELEPHONE (OUTPATIENT)
Dept: FAMILY MEDICINE | Facility: CLINIC | Age: 59
End: 2024-06-14
Payer: COMMERCIAL

## 2024-06-14 NOTE — TELEPHONE ENCOUNTER
Patient Quality Outreach    Patient is due for the following:   Asthma  -  ACT needed  Physical Preventive Adult Physical    Next Steps:   Schedule a Adult Preventative    Type of outreach:    Sent MegaBits message.    Next Steps:  Reach out within 90 days via MegaBits.    Max number of attempts reached: Yes. Will try again in 90 days if patient still on fail list.    Questions for provider review:    None           Diana Bloom MA

## 2024-06-25 DIAGNOSIS — I10 HYPERTENSION GOAL BP (BLOOD PRESSURE) < 140/90: ICD-10-CM

## 2024-06-25 RX ORDER — AMLODIPINE BESYLATE 10 MG/1
TABLET ORAL
Qty: 90 TABLET | Refills: 0 | Status: SHIPPED | OUTPATIENT
Start: 2024-06-25 | End: 2024-09-09

## 2024-06-30 ENCOUNTER — HEALTH MAINTENANCE LETTER (OUTPATIENT)
Age: 59
End: 2024-06-30

## 2024-08-07 ENCOUNTER — OFFICE VISIT (OUTPATIENT)
Dept: OPTOMETRY | Facility: CLINIC | Age: 59
End: 2024-08-07
Payer: COMMERCIAL

## 2024-08-07 DIAGNOSIS — E11.9 TYPE 2 DIABETES MELLITUS WITHOUT COMPLICATION, WITHOUT LONG-TERM CURRENT USE OF INSULIN (H): Primary | ICD-10-CM

## 2024-08-07 DIAGNOSIS — H52.4 PRESBYOPIA: ICD-10-CM

## 2024-08-07 DIAGNOSIS — H52.03 HYPEROPIA, BILATERAL: ICD-10-CM

## 2024-08-07 DIAGNOSIS — H10.13 ALLERGIC CONJUNCTIVITIS OF BOTH EYES: ICD-10-CM

## 2024-08-07 DIAGNOSIS — H40.003 GLAUCOMA SUSPECT OF BOTH EYES: ICD-10-CM

## 2024-08-07 DIAGNOSIS — H25.13 NUCLEAR SCLEROSIS OF BOTH EYES: ICD-10-CM

## 2024-08-07 PROCEDURE — 92015 DETERMINE REFRACTIVE STATE: CPT | Performed by: OPTOMETRIST

## 2024-08-07 PROCEDURE — 92014 COMPRE OPH EXAM EST PT 1/>: CPT | Performed by: OPTOMETRIST

## 2024-08-07 RX ORDER — AZELASTINE HYDROCHLORIDE 0.5 MG/ML
1 SOLUTION/ DROPS OPHTHALMIC 2 TIMES DAILY
Qty: 12 ML | Refills: 11 | Status: SHIPPED | OUTPATIENT
Start: 2024-08-07

## 2024-08-07 ASSESSMENT — VISUAL ACUITY
OS_CC: 20/20
OD_CC: 20/20
OD_SC: 20/40
CORRECTION_TYPE: GLASSES
METHOD: SNELLEN - LINEAR
OS_SC: 20/30
OS_CC: 20/40
OD_CC: 20/50-1

## 2024-08-07 ASSESSMENT — CONF VISUAL FIELD
OD_SUPERIOR_TEMPORAL_RESTRICTION: 0
OS_SUPERIOR_TEMPORAL_RESTRICTION: 0
OD_NORMAL: 1
OD_SUPERIOR_NASAL_RESTRICTION: 0
OS_NORMAL: 1
OS_INFERIOR_NASAL_RESTRICTION: 0
OS_INFERIOR_TEMPORAL_RESTRICTION: 0
OD_INFERIOR_TEMPORAL_RESTRICTION: 0
OD_INFERIOR_NASAL_RESTRICTION: 0
OS_SUPERIOR_NASAL_RESTRICTION: 0

## 2024-08-07 ASSESSMENT — KERATOMETRY
OD_AXISANGLE_DEGREES: 091
OS_K2POWER_DIOPTERS: 42.75
OD_K1POWER_DIOPTERS: 41.75
OD_K2POWER_DIOPTERS: 42.25
OS_AXISANGLE_DEGREES: 068
OS_AXISANGLE2_DEGREES: 158
OS_K1POWER_DIOPTERS: 42.25
OD_AXISANGLE2_DEGREES: 001

## 2024-08-07 ASSESSMENT — TONOMETRY
IOP_METHOD: TONOPEN
OS_IOP_MMHG: 18
OD_IOP_MMHG: 18

## 2024-08-07 ASSESSMENT — EXTERNAL EXAM - RIGHT EYE: OD_EXAM: NORMAL

## 2024-08-07 ASSESSMENT — REFRACTION_MANIFEST
OD_ADD: +2.25
OS_ADD: +2.25
OS_AXIS: 045
OS_SPHERE: +1.00
OD_CYLINDER: SPHERE
OD_SPHERE: +2.00
OS_CYLINDER: +0.50

## 2024-08-07 ASSESSMENT — PACHYMETRY
OD_CT(UM): 530
OS_CT(UM): 520

## 2024-08-07 ASSESSMENT — REFRACTION_WEARINGRX
OD_SPHERE: +2.00
OS_SPHERE: +2.00

## 2024-08-07 ASSESSMENT — SLIT LAMP EXAM - LIDS
COMMENTS: NORMAL
COMMENTS: NORMAL

## 2024-08-07 ASSESSMENT — EXTERNAL EXAM - LEFT EYE: OS_EXAM: NORMAL

## 2024-08-07 NOTE — PROGRESS NOTES
Chief Complaint   Patient presents with    Diabetic Eye Exam        Chief Complaint(s) and History of Present Illness(es)       Diabetic Eye Exam              Vision: is stable    Diabetes Type: Type 2    Duration: 7 years    Blood Sugars: is controlled                   Lab Results   Component Value Date    A1C 6.4 03/13/2024    A1C 6.7 08/28/2023    A1C 8.3 03/31/2023    A1C 8.2 12/09/2022    A1C 7.9 07/06/2022    A1C 7.0 07/09/2021    A1C 8.1 01/11/2021    A1C 7.1 09/28/2020    A1C 6.2 04/16/2020    A1C 6.3 10/30/2019            Last Eye Exam: 7-  Dilated Previously: Yes    What are you currently using to see?  glasses    Distance Vision Acuity: Satisfied with vision    Near Vision Acuity: Satisfied with vision while reading  with otc readers 2 different pairs,one for distance and one for near    Eye Comfort: good- dry- allergies  Do you use eye drops? : Yes: refresh tears   Occupation or Hobbies: sales    Glaucoma suspect- last OCT/VF- Sept -2023 with Dr. Louie- no treatment recommend testing Sept 2024.    Birgit Vargas Optometric Assistant, A.B.O.C.     Medical, surgical and family histories reviewed and updated 8/7/2024.       OBJECTIVE: See Ophthalmology exam    ASSESSMENT:    ICD-10-CM    1. Type 2 diabetes mellitus without complication, without long-term current use of insulin (H)  E11.9 EYE EXAM (SIMPLE-NONBILLABLE)    Negative diabetic retinopathy both eyes      2. Nuclear sclerosis of both eyes  H25.13 EYE EXAM (SIMPLE-NONBILLABLE)      3. Allergic conjunctivitis of both eyes  H10.13 EYE EXAM (SIMPLE-NONBILLABLE)     azelastine (OPTIVAR) 0.05 % ophthalmic solution      4. Glaucoma suspect of both eyes  H40.003 EYE EXAM (SIMPLE-NONBILLABLE)     Adult Eye  Referral      5. Presbyopia  H52.4 REFRACTION      6. Hyperopia, bilateral  H52.03 REFRACTION          PLAN:    Geovany V Marte aware  eye exam results will be sent to Mona Lin.  Patient Instructions   There are not  any signs of the diabetes affecting the eyes today.  It is important that you get your eyes dilated once yearly and keep good control of your diabetes.    You have the start of mild cataracts.  You may notice some blurred vision or glare with night driving.  It is important that you wear good sunglasses to protect your eyes from the ultraviolet light from the sun.  I recommend that you return in 1 year for an eye exam unless there are any sudden changes in your vision.       Optivar- 1 drop both eyes 2 x day as needed for itchy eyes.    Artificial tears- 1 drop both eyes once before bedtime and once in the morning then 2 x day as needed.      Schedule glaucoma testing with Dr. Louie.  Your are due Sept, 2024.    Return in 1 year for a complete eye exam or sooner if needed.    Yoseph Ortiz, OD

## 2024-08-07 NOTE — PATIENT INSTRUCTIONS
There are not any signs of the diabetes affecting the eyes today.  It is important that you get your eyes dilated once yearly and keep good control of your diabetes.    You have the start of mild cataracts.  You may notice some blurred vision or glare with night driving.  It is important that you wear good sunglasses to protect your eyes from the ultraviolet light from the sun.  I recommend that you return in 1 year for an eye exam unless there are any sudden changes in your vision.       Optivar- 1 drop both eyes 2 x day as needed for itchy eyes.    Artificial tears- 1 drop both eyes once before bedtime and once in the morning then 2 x day as needed.      Schedule glaucoma testing with Dr. Louie.  Your are due Sept, 2024.    Return in 1 year for a complete eye exam or sooner if needed.    Yoseph Ortiz, SHIRA    The affects of the dilating drops last for 4- 6 hours.  You will be more sensitive to light and vision will be blurry up close.  Do not drive if you do not feel comfortable.  Mydriatic sunglasses were given if needed.    There is a combination of three treatments which can greatly improve symptoms of dry eyes.     Artificial tears  Heat (eyes closed)  Eyelid and eyelash cleansing (eyes closed)     Use one drop of artificial tears both eyes 4 x daily.  Once in the morning, lunch, dinner and bedtime. Continue to use the drops regardless if your eyes are comfortable or not.  Artificial tears work best as a preventative and not as well after your eyes are starting to bother you.  It may take 4- 6 weeks of using the drops before you notice improvement.  If after that time you are still having problems schedule an appointment for an evaluation and discussion of different treatments such as Restasis or Xiidra.  Dry eyes are a chronic condition and you may have more symptoms at certain times of the year.    Excess tearing can be due to the right tears not working properly or a blockage in the tear drainage system.  You  can try using artificial tears 1 drop both eyes 4 x day.  If the excess tearing is bothersome after 4-6 weeks of treatment then we can send you for further testing.  This would entail a referral to our oculoplastic specialist Dr. Jeni Tsai at the [facility]-[phone].    Recommended brands are:    Systane Complete  Systane Ultra  Systane Balance  Refresh Advanced Optive  Refresh Relieva  Blink    Recommended brands for contact lens wearers are:    Systane contacts  Refresh contacts  Blink contacts    If you are using drops more than 4 x day or have sensitivities to preservatives I recommend non preserved artificial tears.  These come in 1 use vials.  They can be used every 1-2 hours.  Do not reuse the vials.    Recommended brands are:    Refresh Optive Kel-3  Systane- preservative free  Refresh-  preservative free  Blink- preservative free    Gels or ointment can be used at night.    Recommended brands are:    Systane Gel  Refresh Gel  Blink Gel  Genteal Gel    Systane night time (ointment)  Refresh Celluvisc  Refresh PM (ointment)    Optase dry eye spray.  Spray to eyelids 3-4 x daily.  This can be purchased on Amazon.      Visine, Clear Eyes or Murine (drops that get the red out) can irritate the eyes and cause a rebound effect where the eyes become more red and you end up using more drops.  Avoid drops containing tetrahydrozoline, naphazoline, phenylephrine, oxymetazoline.      OTC Lumify is a newer product that gives immediate redness relief without the rebound effect.  Use as needed to take the redness out.    Artificial tears may be used with other drops (such as allergy, glaucoma, antibiotics) around the same time.  Be sure to wait 5 minutes in between drops.    Heat to the eyelids can also improve your symptoms of dry eyes.  Karolyn heat masks can be purchased at Amazon to be used nightly for 10-15 minutes.  Other options are gel masks that can be put in the microwave and purchased at most  pharmacies.      Tea Tree Oil eyelid cleansers recommended are Ocusoft Oust foam cleanser to cleanse eyelids/lashes at night and in the am. Other options are Blephadex or Cliradex eyelid wipes.  KEEP EYES CLOSED when using these products.  These can be purchased on amazon.com   A good product for make up remover with tea tree oil is WeLoveEyes.  This can be found at www.91datong.com or [employer].    Other good eyelid cleansers have hypochlorous which removes excess bacteria and is safe around the eyes. Products are Avenova, Ocusoft Hypochlor or Heyedrate. Spray solution onto cotton pad, close eyes and gently apply to eyelids and eyelashes using side to side motion.  You can also KEEP EYES CLOSED spray and rub into eyelashes.  You do not need to rinse it off. Use morning and evening. These products can be found on Amazon.  You can check with your local pharmacy and see if they can order if for you if they don't have it.    Other brands of eyelid cleansing wipes are:    Ocusoft wipes  Systane wipes    A great eye make up line is [email or web address]        Optometry Providers       Clinic Locations                                 Telephone Number   Dr. Marleny Ashford    [facility] [phone]     [facility] Hours:                [facility] Hours:       [facility] Hours:   [address] [facility] [address]   [address]     [address] [place] [address]   [place] [address]    [place] [address]  Phone: [phone]                    Phone: [phone]     Phone: [phone]                      Monday 8:00-6:00                          Monday 8:00-6:00                          Monday 8:00-6:00              Tuesday 8:00-6:00                          Tuesday 8:00-6:00                           Tuesday 8:00-6:00              Wednesday 8:00-6:00                  Wednesday 8:00-6:00                   Wednesday 8:00-6:00      Thursday 8:00-6:00                        Thursday 8:00-6:00                         Thursday 8:00-6:00            Friday 8:00-5:00                              Friday 8:00-5:00                              Friday 8:00-5:00    [facility] Hours:   [address] [place] [address]  [phone]    Monday 9:00-6:00  Tuesday 9:00-6:00  Wednesday 9:00-6:00  Thursday 9:00-6:00  Friday 9:00-5:00  As always, Thank you for trusting us with your health care needs!

## 2024-08-07 NOTE — LETTER
8/7/2024      Geovany Marte  [address]  [facility] [address]      Dear Colleague,    Thank you for referring your patient, Geovany Marte, to the [facility]. Please see a copy of my visit note below.    Chief Complaint   Patient presents with     Diabetic Eye Exam        Chief Complaint(s) and History of Present Illness(es)       Diabetic Eye Exam              Vision: is stable    Diabetes Type: Type 2    Duration: 7 years    Blood Sugars: is controlled                   Lab Results   Component Value Date    A1C 6.4 03/13/2024    A1C 6.7 08/28/2023    A1C 8.3 03/31/2023    A1C 8.2 12/09/2022    A1C 7.9 07/06/2022    A1C 7.0 07/09/2021    A1C 8.1 01/11/2021    A1C 7.1 09/28/2020    A1C 6.2 04/16/2020    A1C 6.3 10/30/2019            Last Eye Exam: 7-  Dilated Previously: Yes    What are you currently using to see?  glasses    Distance Vision Acuity: Satisfied with vision    Near Vision Acuity: Satisfied with vision while reading  with otc readers 2 different pairs,one for distance and one for near    Eye Comfort: good- dry- allergies  Do you use eye drops? : Yes: refresh tears   Occupation or Hobbies: sales    Glaucoma suspect- last OCT/VF- Sept -2023 with Dr. Louie- no treatment recommend testing Sept 2024.    Birgit Vargas Optometric Assistant, A.B.O.C.     Medical, surgical and family histories reviewed and updated 8/7/2024.       OBJECTIVE: See Ophthalmology exam    ASSESSMENT:    ICD-10-CM    1. Type 2 diabetes mellitus without complication, without long-term current use of insulin (H)  E11.9 EYE EXAM (SIMPLE-NONBILLABLE)    Negative diabetic retinopathy both eyes      2. Nuclear sclerosis of both eyes  H25.13 EYE EXAM (SIMPLE-NONBILLABLE)      3. Allergic conjunctivitis of both eyes  H10.13 EYE EXAM (SIMPLE-NONBILLABLE)     azelastine (OPTIVAR) 0.05 % ophthalmic solution      4. Glaucoma suspect of both eyes  H40.003 EYE EXAM (SIMPLE-NONBILLABLE)     Adult Eye   Referral      5. Presbyopia  H52.4 REFRACTION      6. Hyperopia, bilateral  H52.03 REFRACTION          PLAN:    Geovany Marte aware  eye exam results will be sent to Mona Lin.  Patient Instructions   There are not any signs of the diabetes affecting the eyes today.  It is important that you get your eyes dilated once yearly and keep good control of your diabetes.    You have the start of mild cataracts.  You may notice some blurred vision or glare with night driving.  It is important that you wear good sunglasses to protect your eyes from the ultraviolet light from the sun.  I recommend that you return in 1 year for an eye exam unless there are any sudden changes in your vision.       Optivar- 1 drop both eyes 2 x day as needed for itchy eyes.    Artificial tears- 1 drop both eyes once before bedtime and once in the morning then 2 x day as needed.      Schedule glaucoma testing with Dr. Louie.  Your are due Sept, 2024.    Return in 1 year for a complete eye exam or sooner if needed.    Yoseph Ortiz, SHIRA             Again, thank you for allowing me to participate in the care of your patient.        Sincerely,        Yoseph Ortiz, OD

## 2024-09-07 DIAGNOSIS — I10 HYPERTENSION GOAL BP (BLOOD PRESSURE) < 140/90: ICD-10-CM

## 2024-09-09 RX ORDER — AMLODIPINE BESYLATE 10 MG/1
TABLET ORAL
Qty: 90 TABLET | Refills: 3 | Status: SHIPPED | OUTPATIENT
Start: 2024-09-09

## 2024-09-20 ENCOUNTER — OFFICE VISIT (OUTPATIENT)
Dept: FAMILY MEDICINE | Facility: CLINIC | Age: 59
End: 2024-09-20
Attending: FAMILY MEDICINE
Payer: COMMERCIAL

## 2024-09-20 VITALS
HEART RATE: 79 BPM | WEIGHT: 301 LBS | OXYGEN SATURATION: 99 % | RESPIRATION RATE: 18 BRPM | HEIGHT: 72 IN | TEMPERATURE: 98.4 F | SYSTOLIC BLOOD PRESSURE: 138 MMHG | BODY MASS INDEX: 40.77 KG/M2 | DIASTOLIC BLOOD PRESSURE: 70 MMHG

## 2024-09-20 DIAGNOSIS — E11.65 TYPE 2 DIABETES MELLITUS WITH HYPERGLYCEMIA, WITHOUT LONG-TERM CURRENT USE OF INSULIN (H): ICD-10-CM

## 2024-09-20 DIAGNOSIS — I10 HYPERTENSION GOAL BP (BLOOD PRESSURE) < 140/90: ICD-10-CM

## 2024-09-20 DIAGNOSIS — C61 PROSTATE CANCER (H): ICD-10-CM

## 2024-09-20 DIAGNOSIS — Z00.00 ROUTINE GENERAL MEDICAL EXAMINATION AT A HEALTH CARE FACILITY: Primary | ICD-10-CM

## 2024-09-20 LAB
ANION GAP SERPL CALCULATED.3IONS-SCNC: 11 MMOL/L (ref 7–15)
BUN SERPL-MCNC: 12.3 MG/DL (ref 8–23)
CALCIUM SERPL-MCNC: 9.3 MG/DL (ref 8.8–10.4)
CHLORIDE SERPL-SCNC: 103 MMOL/L (ref 98–107)
CREAT SERPL-MCNC: 0.92 MG/DL (ref 0.67–1.17)
EGFRCR SERPLBLD CKD-EPI 2021: >90 ML/MIN/1.73M2
EST. AVERAGE GLUCOSE BLD GHB EST-MCNC: 140 MG/DL
GLUCOSE SERPL-MCNC: 118 MG/DL (ref 70–99)
HBA1C MFR BLD: 6.5 % (ref 0–5.6)
HCO3 SERPL-SCNC: 24 MMOL/L (ref 22–29)
POTASSIUM SERPL-SCNC: 4.1 MMOL/L (ref 3.4–5.3)
PSA FREE MFR SERPL: 13.33 %
PSA FREE SERPL-MCNC: 0.8 NG/ML
PSA SERPL DL<=0.01 NG/ML-MCNC: 6 NG/ML (ref 0–4.5)
SODIUM SERPL-SCNC: 138 MMOL/L (ref 135–145)

## 2024-09-20 PROCEDURE — 36415 COLL VENOUS BLD VENIPUNCTURE: CPT | Performed by: FAMILY MEDICINE

## 2024-09-20 PROCEDURE — 91320 SARSCV2 VAC 30MCG TRS-SUC IM: CPT | Performed by: FAMILY MEDICINE

## 2024-09-20 PROCEDURE — 90480 ADMN SARSCOV2 VAC 1/ONLY CMP: CPT | Performed by: FAMILY MEDICINE

## 2024-09-20 PROCEDURE — 99214 OFFICE O/P EST MOD 30 MIN: CPT | Mod: 25 | Performed by: FAMILY MEDICINE

## 2024-09-20 PROCEDURE — 83036 HEMOGLOBIN GLYCOSYLATED A1C: CPT | Performed by: FAMILY MEDICINE

## 2024-09-20 PROCEDURE — 90472 IMMUNIZATION ADMIN EACH ADD: CPT | Performed by: FAMILY MEDICINE

## 2024-09-20 PROCEDURE — 84153 ASSAY OF PSA TOTAL: CPT | Performed by: FAMILY MEDICINE

## 2024-09-20 PROCEDURE — 84154 ASSAY OF PSA FREE: CPT | Performed by: FAMILY MEDICINE

## 2024-09-20 PROCEDURE — 90678 RSV VACC PREF BIVALENT IM: CPT | Performed by: FAMILY MEDICINE

## 2024-09-20 PROCEDURE — 90471 IMMUNIZATION ADMIN: CPT | Performed by: FAMILY MEDICINE

## 2024-09-20 PROCEDURE — 80048 BASIC METABOLIC PNL TOTAL CA: CPT | Performed by: FAMILY MEDICINE

## 2024-09-20 PROCEDURE — 90673 RIV3 VACCINE NO PRESERV IM: CPT | Performed by: FAMILY MEDICINE

## 2024-09-20 PROCEDURE — 99396 PREV VISIT EST AGE 40-64: CPT | Mod: 25 | Performed by: FAMILY MEDICINE

## 2024-09-20 RX ORDER — CARVEDILOL 25 MG/1
TABLET ORAL
Qty: 180 TABLET | Refills: 3 | Status: SHIPPED | OUTPATIENT
Start: 2024-09-20

## 2024-09-20 ASSESSMENT — SOCIAL DETERMINANTS OF HEALTH (SDOH): HOW OFTEN DO YOU GET TOGETHER WITH FRIENDS OR RELATIVES?: ONCE A WEEK

## 2024-09-20 ASSESSMENT — PAIN SCALES - GENERAL: PAINLEVEL: NO PAIN (0)

## 2024-09-20 NOTE — PROGRESS NOTES
"Preventive Care Visit  [facility]  Mona Scales MD, Family Medicine  Sep 20, 2024      Assessment & Plan     Routine general medical examination at a health care facility  Routine preventive reviewed    Type 2 diabetes mellitus with hyperglycemia, without long-term current use of insulin (H)  Previously controlled but increase out of pocket for Ozempic - will try Trulicity to see if cheaper and patient will notify me of cost.  Testing supplies ordered and follow up in 6 months.  - HEMOGLOBIN A1C; Future  - dulaglutide (TRULICITY) 1.5 MG/0.5ML pen; Inject 1.5 mg subcutaneously every 7 days.  - blood glucose (NO BRAND SPECIFIED) lancets standard; Use to test blood sugar 1 times daily or as directed.  - blood glucose (NO BRAND SPECIFIED) test strip; Use to test blood sugar 1 times daily or as directed.  - blood glucose monitoring (NO BRAND SPECIFIED) meter device kit; Use to test blood sugar one times daily or as directed. Preferred blood glucose meter OR supplies to accompany: Blood Glucose Monitor Brands: per insurance.  - HEMOGLOBIN A1C    Hypertension goal BP (blood pressure) < 140/90  Controlled - continue current treatment  - BASIC METABOLIC PANEL; Future  - carvedilol (COREG) 25 MG tablet; TAKE 1 TABLET BY MOUTH  TWICE DAILY WITH MEALS  - BASIC METABOLIC PANEL    Prostate cancer (H)  Seeing Urology  - PSA total and free [[id number]]      Nicotine/Tobacco Cessation  He reports that he has been smoking cigars and cigarettes. He has a 3 pack-year smoking history. He has never used smokeless tobacco.  Nicotine/Tobacco Cessation Plan  Information offered: Patient not interested at this time      BMI  Estimated body mass index is 40.33 kg/m  as calculated from the following:    Height as of this encounter: 1.84 m (6' 0.44\").    Weight as of this encounter: 136.5 kg (301 lb).   Weight management plan: Discussed healthy diet and exercise guidelines    Counseling  Appropriate " preventive services were addressed with this patient via screening, questionnaire, or discussion as appropriate for fall prevention, nutrition, physical activity, Tobacco-use cessation, social engagement, weight loss and cognition.  Checklist reviewing preventive services available has been given to the patient.  Reviewed patient's diet, addressing concerns and/or questions.   He is at risk for lack of exercise and has been provided with information to increase physical activity for the benefit of his well-being.   He is at risk for psychosocial distress and has been provided with information to reduce risk.       The uses and side effects, including black box warnings as appropriate, were discussed in detail.  All patient questions were answered.  The patient was instructed to call immediately if any side effects developed.     Khris Armenta is a 58 year old, presenting for the following:  Physical         Health Care Directive  Patient does not have a Health Care Directive or Living Will: Advance Directive received and scanned. Click on Code in the patient header to view.    History of Present Illness       Diabetes:   He presents for follow up of diabetes.  He is checking home blood glucose a few times a month.   He checks blood glucose before meals.  Blood glucose is never over 200 and never under 70. He is aware of hypoglycemia symptoms including shakiness.    He has no concerns regarding his diabetes at this time.   He is not experiencing numbness or burning in feet, excessive thirst, blurry vision, weight changes or redness, sores or blisters on feet.                9/20/2024   General Health   How would you rate your overall physical health? Good   Feel stress (tense, anxious, or unable to sleep) Only a little      (!) STRESS CONCERN      9/20/2024   Nutrition   Three or more servings of calcium each day? (!) I DON'T KNOW   Diet: Low fat/cholesterol    Diabetic   How many servings of fruit and  vegetables per day? (!) 0-1   How many sweetened beverages each day? 0-1       Multiple values from one day are sorted in reverse-chronological order         9/20/2024   Exercise   Days per week of moderate/strenous exercise 2 days   Average minutes spent exercising at this level 10 min      (!) EXERCISE CONCERN      9/20/2024   Social Factors   Frequency of gathering with friends or relatives Once a week   Worry food won't last until get money to buy more No   Food not last or not have enough money for food? No   Do you have housing? (Housing is defined as stable permanent housing and does not include staying ouside in a car, in a tent, in an abandoned building, in an overnight shelter, or couch-surfing.) Yes   Are you worried about losing your housing? No   Lack of transportation? No   Unable to get utilities (heat,electricity)? No            9/20/2024   Fall Risk   Fallen 2 or more times in the past year? No   Trouble with walking or balance? No             9/20/2024   Dental   Dentist two times every year? Yes            9/20/2024   TB Screening   Were you born outside of the [place]? No            Today's PHQ-2 Score:       9/20/2024     7:34 AM   PHQ-2 ( 1999 Pf[employer])   Q1: Little interest or pleasure in doing things 0   Q2: Feeling down, depressed or hopeless 0   PHQ-2 Score 0   Q1: Little interest or pleasure in doing things Not at all   Q2: Feeling down, depressed or hopeless Not at all   PHQ-2 Score 0           9/20/2024   Substance Use   Alcohol more than 3/day or more than 7/wk No   Do you use any other substances recreationally? No        Social History     Tobacco Use    Smoking status: Some Days     Current packs/day: 0.10     Average packs/day: 0.1 packs/day for 30.0 years (3.0 ttl pk-yrs)     Types: Cigars, Cigarettes    Smokeless tobacco: Never    Tobacco comments:     occasional cigar (roughly once every two months)   Vaping Use    Vaping status: Never Used   Substance Use Topics    Alcohol use: Yes      "Alcohol/week: 0.0 standard drinks of alcohol     Comment: Once a week.    Drug use: No           9/20/2024   STI Screening   New sexual partner(s) since last STI/HIV test? No      Last PSA:   PSA   Date Value Ref Range Status   01/11/2021 5.14 (H) 0 - 4 ug/L Final     Comment:     Assay Method:  Chemiluminescence using Siemens Vista analyzer     PSA Tumor Marker   Date Value Ref Range Status   03/13/2024 5.84 (H) 0.00 - 4.50 ng/mL Final   03/28/2023 5.16 (H) 0.00 - 4.00 ug/L Final     ASCVD Risk   The 10-year ASCVD risk score (Juan José CHAVEZ, et al., 2019) is: 41.5%    Values used to calculate the score:      Age: 58 years      Sex: Male      Is Non- : Yes      Diabetic: Yes      Tobacco smoker: Yes      Systolic Blood Pressure: 138 mmHg      Is BP treated: Yes      HDL Cholesterol: 46 mg/dL      Total Cholesterol: 170 mg/dL           Reviewed and updated as needed this visit by Provider   Tobacco  Allergies  Meds  Problems  Med Hx  Surg Hx  Fam Hx                  Review of Systems  Constitutional, HEENT, cardiovascular, pulmonary, gi and gu systems are negative, except as otherwise noted.     Objective    Exam  /70 (BP Location: Left arm, Patient Position: Sitting, Cuff Size: Adult Large)   Pulse 79   Temp 98.4  F (36.9  C) (Oral)   Resp 18   Ht 1.84 m (6' 0.44\")   Wt 136.5 kg (301 lb)   SpO2 99%   BMI 40.33 kg/m     Estimated body mass index is 40.33 kg/m  as calculated from the following:    Height as of this encounter: 1.84 m (6' 0.44\").    Weight as of this encounter: 136.5 kg (301 lb).    Physical Exam  GENERAL: alert and no distress  EYES: Eyes grossly normal to inspection, PERRL and conjunctivae and sclerae normal  HENT: ear canals and TM's normal, nose and mouth without ulcers or lesions  NECK: no adenopathy, no asymmetry, masses, or scars  RESP: lungs clear to auscultation - no rales, rhonchi or wheezes  CV: regular rate and rhythm, normal S1 S2, no S3 or " S4, no murmur, click or rub, no peripheral edema  ABDOMEN: soft, nontender, no hepatosplenomegaly, no masses and bowel sounds normal  MS: no gross musculoskeletal defects noted, no edema  SKIN: no suspicious lesions or rashes  NEURO: Normal strength and tone, mentation intact and speech normal  PSYCH: mentation appears normal, affect normal/bright  Diabetic foot exam: normal DP and PT pulses, no trophic changes or ulcerative lesions, normal sensory exam, and normal monofilament exam        Signed Electronically by: Mona Scales MD

## 2024-09-20 NOTE — PATIENT INSTRUCTIONS
At [facility], we strive to deliver an exceptional experience to you, every time we see you. If you receive a survey, please let us know what we are doing well and/or what we could improve upon, as we do value your feedback.  If you have MyChart, you can expect to receive results automatically within 24 hours of their completion.  Your provider will send a note interpreting your results as well.   If you do not have MyChart, you should receive your results in about a week by mail.    Your care team:                            Family Medicine Internal Medicine   MD Gurpreet Howard, MD Mona Norman, MD Adonis Albright, MD Loli Shrestha, PARicardaC    Lee Yin, MD Pediatrics   Catalina Durant, MD Saida Banks, MD Candelaria Denton, APRN CNP Mouna Avalos APRN CNP   Logan Adrian, MD Marge Elias, MD Ayse Harris, CNP     Mark Fried, CNP Same-Day Provider (No follow-up visits)   GEOVANNI Cheney, DNP Margaret Madera, PA-C   GEOVANNI Mao, FNP, BC EDGARDO GaliciaC     Clinic hours: Monday - Thursday 7 am-6 pm; Fridays 7 am-5 pm.   Urgent care: Monday - Friday 10 am- 8 pm; Saturday and Sunday 9 am-5 pm.    Clinic: [phone]       [facility]: Monday - Thursday 8 am - 7 pm; Friday 8 am - 6 pm  [facility]: [phone]     Patient Education   Preventive Care Advice   This is general advice given by our system to help you stay healthy. However, your care team may have specific advice just for you. Please talk to your care team about your preventive care needs.  Nutrition  Eat 5 or more servings of fruits and vegetables each day.  Try wheat bread, brown rice and whole grain pasta (instead of white bread, rice, and pasta).  Get enough calcium and vitamin D. Check the label on foods and aim for 100% of the RDA (recommended daily allowance).  Lifestyle  Exercise at least 150  minutes each week  (30 minutes a day, 5 days a week).  Do muscle strengthening activities 2 days a week. These help control your weight and prevent disease.  No smoking.  Wear sunscreen to prevent skin cancer.  Have a dental exam and cleaning every 6 months.  Yearly exams  See your health care team every year to talk about:  Any changes in your health.  Any medicines your care team has prescribed.  Preventive care, family planning, and ways to prevent chronic diseases.  Shots (vaccines)   HPV shots (up to age 26), if you've never had them before.  Hepatitis B shots (up to age 59), if you've never had them before.  COVID-19 shot: Get this shot when it's due.  Flu shot: Get a flu shot every year.  Tetanus shot: Get a tetanus shot every 10 years.  Pneumococcal, hepatitis A, and RSV shots: Ask your care team if you need these based on your risk.  Shingles shot (for age 50 and up)  General health tests  Diabetes screening:  Starting at age 35, Get screened for diabetes at least every 3 years.  If you are younger than age 35, ask your care team if you should be screened for diabetes.  Cholesterol test: At age 39, start having a cholesterol test every 5 years, or more often if advised.  Bone density scan (DEXA): At age 50, ask your care team if you should have this scan for osteoporosis (brittle bones).  Hepatitis C: Get tested at least once in your life.  STIs (sexually transmitted infections)  Before age 24: Ask your care team if you should be screened for STIs.  After age 24: Get screened for STIs if you're at risk. You are at risk for STIs (including HIV) if:  You are sexually active with more than one person.  You don't use condoms every time.  You or a partner was diagnosed with a sexually transmitted infection.  If you are at risk for HIV, ask about PrEP medicine to prevent HIV.  Get tested for HIV at least once in your life, whether you are at risk for HIV or not.  Cancer screening tests  Cervical cancer screening:  If you have a cervix, begin getting regular cervical cancer screening tests starting at age 21.  Breast cancer scan (mammogram): If you've ever had breasts, begin having regular mammograms starting at age 40. This is a scan to check for breast cancer.  Colon cancer screening: It is important to start screening for colon cancer at age 45.  Have a colonoscopy test every 10 years (or more often if you're at risk) Or, ask your provider about stool tests like a FIT test every year or Cologuard test every 3 years.  To learn more about your testing options, visit:   .  For help making a decision, visit:   https://bit.ly/ol41267.  Prostate cancer screening test: If you have a prostate, ask your care team if a prostate cancer screening test (PSA) at age 55 is right for you.  Lung cancer screening: If you are a current or former smoker ages 50 to 80, ask your care team if ongoing lung cancer screenings are right for you.  For informational purposes only. Not to replace the advice of your health care provider. Copyright   2023 [employer]. All rights reserved. Clinically reviewed by the [facility] Transitions Program. [employer] [id number] - REV 01/24.

## 2024-10-24 ENCOUNTER — VIRTUAL VISIT (OUTPATIENT)
Dept: UROLOGY | Facility: CLINIC | Age: 59
End: 2024-10-24
Payer: COMMERCIAL

## 2024-10-24 DIAGNOSIS — C61 PROSTATE CANCER (H): Primary | ICD-10-CM

## 2024-10-24 DIAGNOSIS — N40.1 BENIGN PROSTATIC HYPERPLASIA WITH LOWER URINARY TRACT SYMPTOMS, SYMPTOM DETAILS UNSPECIFIED: ICD-10-CM

## 2024-10-24 PROCEDURE — 99213 OFFICE O/P EST LOW 20 MIN: CPT | Mod: 95 | Performed by: UROLOGY

## 2024-10-24 NOTE — PROGRESS NOTES
Geovany is a 58 year old who is being evaluated via a billable video visit.    How would you like to obtain your AVS? MyChart  If the video visit is dropped, the invitation should be resent by: Text to cell phone: [phone]  Will anyone else be joining your video visit? No      Assessment & Plan   Problem List Items Addressed This Visit       Prostate cancer (H) - Primary    Relevant Orders    PSA tumor marker     Other Visit Diagnoses       Benign prostatic hyperplasia with lower urinary tract symptoms, symptom details unspecified                 Review of the result(s) of each unique test - psa  Ordering of each unique test           No follow-ups on file.      Subjective   Geovany is a 58 year old, presenting for the following health issues:  No chief complaint on file.    HPI      f/u for prostate cancer group 1 under active observation.  He is without any complaints. Prostate MRI on 2022 was ok.  Recent psa was stable.  He underwent prostate biopsy in 2023 without any cancer found.  Recent psa was 6 slightly up.  He has minimal LUTS.  Prostate size was 68 gm.      Review of Systems  Constitutional, HEENT, cardiovascular, pulmonary, gi and gu systems are negative, except as otherwise noted.      Objective             Physical Exam   GENERAL: alert and no distress  EYES: Eyes grossly normal to inspection.  No discharge or erythema, or obvious scleral/conjunctival abnormalities.  RESP: No audible wheeze, cough, or visible cyanosis.    SKIN: Visible skin clear. No significant rash, abnormal pigmentation or lesions.  NEURO: Cranial nerves grossly intact.  Mentation and speech appropriate for age.  PSYCH: Appropriate affect, tone, and pace of words    Prostate cancer: group 1.  Psa slightly up.  Recheck psa in six months.  BPH:  no LUTS.  Cont observation.      Video-Visit Details    Type of service:  Video Visit   Originating Location (pt. Location): Home    Distant Location (provider location):  On-site  Platform  used for Video Visit: Felix  Signed Electronically by: Zac Ivy MD

## 2024-10-25 ENCOUNTER — OFFICE VISIT (OUTPATIENT)
Dept: OPHTHALMOLOGY | Facility: CLINIC | Age: 59
End: 2024-10-25
Attending: OPTOMETRIST
Payer: COMMERCIAL

## 2024-10-25 DIAGNOSIS — H40.003 GLAUCOMA SUSPECT OF BOTH EYES: ICD-10-CM

## 2024-10-25 PROCEDURE — 92083 EXTENDED VISUAL FIELD XM: CPT | Performed by: STUDENT IN AN ORGANIZED HEALTH CARE EDUCATION/TRAINING PROGRAM

## 2024-10-25 PROCEDURE — 92133 CPTRZD OPH DX IMG PST SGM ON: CPT | Performed by: STUDENT IN AN ORGANIZED HEALTH CARE EDUCATION/TRAINING PROGRAM

## 2024-10-25 PROCEDURE — 92012 INTRM OPH EXAM EST PATIENT: CPT | Performed by: STUDENT IN AN ORGANIZED HEALTH CARE EDUCATION/TRAINING PROGRAM

## 2024-10-25 ASSESSMENT — VISUAL ACUITY
METHOD: SNELLEN - LINEAR
OS_SC: 20/30
OD_SC+: -1
OS_SC+: -2
OD_SC: 20/60
OD_PH_SC+: -1
OD_PH_SC: 20/30

## 2024-10-25 ASSESSMENT — SLIT LAMP EXAM - LIDS
COMMENTS: NORMAL
COMMENTS: NORMAL

## 2024-10-25 ASSESSMENT — TONOMETRY
OS_IOP_MMHG: 14
IOP_METHOD: APPLANATION
OD_IOP_MMHG: 15

## 2024-10-25 ASSESSMENT — EXTERNAL EXAM - LEFT EYE: OS_EXAM: NORMAL

## 2024-10-25 ASSESSMENT — EXTERNAL EXAM - RIGHT EYE: OD_EXAM: NORMAL

## 2024-10-25 NOTE — LETTER
"10/25/2024      Geovany Marte  [address]  [place] [address]      Dear Colleague,    Thank you for referring your patient, Geovany Marte, to the [facility]. Please see a copy of my visit note below.     Current Eye Medications:  refresh - both eyes PRN     Subjective:  HVF, OCT and IOP - vision stable both eyes. Sees Dr. Ortiz for complete exams.      Objective:  See Ophthalmology Exam.       Assessment:  Geovany Marte is a 58 year old male who presents with:   Encounter Diagnosis   Name Primary?     Glaucoma suspect of both eyes Intraocular pressure 15/14 today. Continue monitoring closely.     OCT optic nerve: avg retinal nerve fiber layer 93/88; within normal limits and stable both eyes     Logan visual field (HVF) 24-2: within normal limits right eye; sup loss (early arcuate defect?) sup - stable compared back to 2017       Plan:  Continue monitoring for glaucoma suspicion    Continue artificial tears up to four times a day as needed (Refresh Optive, Systane Balance, or TheraTears. Avoid generic artificial tears or \"get the red out\" drops).     Continue complete eye exams with Dr. Royal Jason Louie MD  [phone]       Again, thank you for allowing me to participate in the care of your patient.        Sincerely,        Jason Louie MD  "

## 2024-10-25 NOTE — PATIENT INSTRUCTIONS
"Continue monitoring for glaucoma suspicion    Continue artificial tears up to four times a day as needed (Refresh Optive, Systane Balance, or TheraTears. Avoid generic artificial tears or \"get the red out\" drops).     Continue complete eye exams with Dr. Royal Jason Louie MD  [phone]   "

## 2024-10-25 NOTE — PROGRESS NOTES
" Current Eye Medications:  refresh - both eyes PRN     Subjective:  HVF, OCT and IOP - vision stable both eyes. Sees Dr. Ortiz for complete exams.      Objective:  See Ophthalmology Exam.       Assessment:  Geovany Marte is a 58 year old male who presents with:   Encounter Diagnosis   Name Primary?    Glaucoma suspect of both eyes Intraocular pressure 15/14 today. Continue monitoring closely.     OCT optic nerve: avg retinal nerve fiber layer 93/88; within normal limits and stable both eyes     Logan visual field (HVF) 24-2: within normal limits right eye; sup loss (early arcuate defect?) sup - stable compared back to 2017       Plan:  Continue monitoring for glaucoma suspicion    Continue artificial tears up to four times a day as needed (Refresh Optive, Systane Balance, or TheraTears. Avoid generic artificial tears or \"get the red out\" drops).     Continue complete eye exams with Dr. Royal Jason Louie MD  [phone]     "

## 2024-12-27 DIAGNOSIS — E11.65 TYPE 2 DIABETES MELLITUS WITH HYPERGLYCEMIA, WITHOUT LONG-TERM CURRENT USE OF INSULIN (H): ICD-10-CM

## 2024-12-27 DIAGNOSIS — E66.01 MORBID OBESITY (H): ICD-10-CM

## 2024-12-30 RX ORDER — SEMAGLUTIDE 1.34 MG/ML
INJECTION, SOLUTION SUBCUTANEOUS
Qty: 9 ML | Refills: 3 | OUTPATIENT
Start: 2024-12-30

## 2025-01-06 ENCOUNTER — MYC REFILL (OUTPATIENT)
Dept: FAMILY MEDICINE | Facility: CLINIC | Age: 60
End: 2025-01-06
Payer: COMMERCIAL

## 2025-01-06 DIAGNOSIS — E66.01 MORBID OBESITY (H): ICD-10-CM

## 2025-01-06 DIAGNOSIS — E11.65 TYPE 2 DIABETES MELLITUS WITH HYPERGLYCEMIA, WITHOUT LONG-TERM CURRENT USE OF INSULIN (H): ICD-10-CM

## 2025-01-07 RX ORDER — SEMAGLUTIDE 1.34 MG/ML
INJECTION, SOLUTION SUBCUTANEOUS
Qty: 9 ML | Refills: 3 | OUTPATIENT
Start: 2025-01-07

## 2025-02-15 DIAGNOSIS — E66.01 MORBID OBESITY (H): ICD-10-CM

## 2025-02-15 DIAGNOSIS — E11.65 TYPE 2 DIABETES MELLITUS WITH HYPERGLYCEMIA, WITHOUT LONG-TERM CURRENT USE OF INSULIN (H): ICD-10-CM

## 2025-02-17 ENCOUNTER — MYC REFILL (OUTPATIENT)
Dept: FAMILY MEDICINE | Facility: CLINIC | Age: 60
End: 2025-02-17
Payer: COMMERCIAL

## 2025-02-17 DIAGNOSIS — E66.01 MORBID OBESITY (H): ICD-10-CM

## 2025-02-17 DIAGNOSIS — E11.65 TYPE 2 DIABETES MELLITUS WITH HYPERGLYCEMIA, WITHOUT LONG-TERM CURRENT USE OF INSULIN (H): ICD-10-CM

## 2025-02-17 RX ORDER — SEMAGLUTIDE 1.34 MG/ML
1 INJECTION, SOLUTION SUBCUTANEOUS
Qty: 9 ML | Refills: 0 | Status: SHIPPED | OUTPATIENT
Start: 2025-02-17

## 2025-02-17 RX ORDER — SEMAGLUTIDE 1.34 MG/ML
INJECTION, SOLUTION SUBCUTANEOUS
Qty: 3 ML | Refills: 11 | OUTPATIENT
Start: 2025-02-17

## 2025-02-18 ENCOUNTER — PATIENT OUTREACH (OUTPATIENT)
Dept: CARE COORDINATION | Facility: CLINIC | Age: 60
End: 2025-02-18
Payer: COMMERCIAL

## 2025-02-27 ENCOUNTER — TELEPHONE (OUTPATIENT)
Dept: FAMILY MEDICINE | Facility: CLINIC | Age: 60
End: 2025-02-27
Payer: COMMERCIAL

## 2025-02-27 NOTE — TELEPHONE ENCOUNTER
Plan does not cover Semaglutide, 1 MG/DOSE, (OZEMPIC, 1 MG/DOSE,) 4 MG/3ML pen .      Please go to [email or web address] to initiate Prior Auth or switch to alternative medication.    Insurance type and ID number: [id number]      Additional Information:     Brina Phoenix

## 2025-03-02 NOTE — TELEPHONE ENCOUNTER
PA Initiation    Medication: OZEMPIC (1 MG/DOSE) 4 MG/3ML SC SOPN  Insurance Company: [employer] ([facility]) - Phone [phone] Fax [phone]  Pharmacy Filling the Rx:    Filling Pharmacy Phone:    Filling Pharmacy Fax:    Start Date: 3/2/2025     Key [id number]

## 2025-03-03 NOTE — TELEPHONE ENCOUNTER
Prior Authorization Approval    Medication: OZEMPIC (1 MG/DOSE) 4 MG/3ML SC SOPN  Authorization Effective Date: 3/2/2025  Authorization Expiration Date: 3/2/2026  Approved Dose/Quantity: 3ml per 28 days  Reference #: [id number]   Insurance Company: [employer] ([facility]) - Phone [phone] Fax [phone]  Expected CoPay: $    CoPay Card Available:      Financial Assistance Needed:   Which Pharmacy is filling the prescription:    Pharmacy Notified: yes  Patient Notified: yes

## 2025-03-18 ASSESSMENT — ASTHMA QUESTIONNAIRES
ACT_TOTALSCORE: 24
QUESTION_2 LAST FOUR WEEKS HOW OFTEN HAVE YOU HAD SHORTNESS OF BREATH: NOT AT ALL
QUESTION_5 LAST FOUR WEEKS HOW WOULD YOU RATE YOUR ASTHMA CONTROL: WELL CONTROLLED
ACT_TOTALSCORE: 24
QUESTION_3 LAST FOUR WEEKS HOW OFTEN DID YOUR ASTHMA SYMPTOMS (WHEEZING, COUGHING, SHORTNESS OF BREATH, CHEST TIGHTNESS OR PAIN) WAKE YOU UP AT NIGHT OR EARLIER THAN USUAL IN THE MORNING: NOT AT ALL
QUESTION_1 LAST FOUR WEEKS HOW MUCH OF THE TIME DID YOUR ASTHMA KEEP YOU FROM GETTING AS MUCH DONE AT WORK, SCHOOL OR AT HOME: NONE OF THE TIME
QUESTION_4 LAST FOUR WEEKS HOW OFTEN HAVE YOU USED YOUR RESCUE INHALER OR NEBULIZER MEDICATION (SUCH AS ALBUTEROL): NOT AT ALL

## 2025-03-19 ENCOUNTER — OFFICE VISIT (OUTPATIENT)
Dept: FAMILY MEDICINE | Facility: CLINIC | Age: 60
End: 2025-03-19
Payer: COMMERCIAL

## 2025-03-19 VITALS
TEMPERATURE: 97 F | RESPIRATION RATE: 18 BRPM | SYSTOLIC BLOOD PRESSURE: 129 MMHG | OXYGEN SATURATION: 99 % | WEIGHT: 294.6 LBS | DIASTOLIC BLOOD PRESSURE: 80 MMHG | HEART RATE: 71 BPM | BODY MASS INDEX: 39.04 KG/M2 | HEIGHT: 73 IN

## 2025-03-19 DIAGNOSIS — E11.65 TYPE 2 DIABETES MELLITUS WITH HYPERGLYCEMIA, WITHOUT LONG-TERM CURRENT USE OF INSULIN (H): Primary | ICD-10-CM

## 2025-03-19 DIAGNOSIS — E66.01 MORBID OBESITY (H): ICD-10-CM

## 2025-03-19 DIAGNOSIS — Z91.013 SHELLFISH ALLERGY: ICD-10-CM

## 2025-03-19 DIAGNOSIS — C61 PROSTATE CANCER (H): ICD-10-CM

## 2025-03-19 DIAGNOSIS — I10 HYPERTENSION GOAL BP (BLOOD PRESSURE) < 140/90: ICD-10-CM

## 2025-03-19 DIAGNOSIS — E78.5 HYPERLIPIDEMIA LDL GOAL <100: ICD-10-CM

## 2025-03-19 LAB
EST. AVERAGE GLUCOSE BLD GHB EST-MCNC: 128 MG/DL
HBA1C MFR BLD: 6.1 % (ref 0–5.6)

## 2025-03-19 PROCEDURE — G2211 COMPLEX E/M VISIT ADD ON: HCPCS | Performed by: FAMILY MEDICINE

## 2025-03-19 PROCEDURE — 3074F SYST BP LT 130 MM HG: CPT | Performed by: FAMILY MEDICINE

## 2025-03-19 PROCEDURE — 83036 HEMOGLOBIN GLYCOSYLATED A1C: CPT | Performed by: FAMILY MEDICINE

## 2025-03-19 PROCEDURE — 1126F AMNT PAIN NOTED NONE PRSNT: CPT | Performed by: FAMILY MEDICINE

## 2025-03-19 PROCEDURE — 3079F DIAST BP 80-89 MM HG: CPT | Performed by: FAMILY MEDICINE

## 2025-03-19 PROCEDURE — 99214 OFFICE O/P EST MOD 30 MIN: CPT | Performed by: FAMILY MEDICINE

## 2025-03-19 PROCEDURE — 36415 COLL VENOUS BLD VENIPUNCTURE: CPT | Performed by: FAMILY MEDICINE

## 2025-03-19 RX ORDER — PRAVASTATIN SODIUM 80 MG/1
80 TABLET ORAL EVERY EVENING
Qty: 90 TABLET | Refills: 1 | Status: SHIPPED | OUTPATIENT
Start: 2025-03-19

## 2025-03-19 RX ORDER — HYDROCHLOROTHIAZIDE 12.5 MG/1
12.5 TABLET ORAL EVERY MORNING
Qty: 90 TABLET | Refills: 1 | Status: SHIPPED | OUTPATIENT
Start: 2025-03-19

## 2025-03-19 RX ORDER — EPINEPHRINE 0.3 MG/.3ML
0.3 INJECTION SUBCUTANEOUS PRN
Qty: 4 ML | Refills: 1 | Status: SHIPPED | OUTPATIENT
Start: 2025-03-19

## 2025-03-19 RX ORDER — LANCETS
EACH MISCELLANEOUS
Qty: 100 EACH | Refills: 6 | Status: SHIPPED | OUTPATIENT
Start: 2025-03-19

## 2025-03-19 ASSESSMENT — PAIN SCALES - GENERAL: PAINLEVEL_OUTOF10: NO PAIN (0)

## 2025-03-19 NOTE — PATIENT INSTRUCTIONS
At [facility], we strive to deliver an exceptional experience to you, every time we see you. If you receive a survey, please let us know what we are doing well and/or what we could improve upon, as we do value your feedback.  If you have MyChart, you can expect to receive results automatically within 24 hours of their completion.  Your provider will send a note interpreting your results as well.   If you do not have MyChart, you should receive your results in about a week by mail.    Your care team:                            Family Medicine Internal Medicine   MD Gurpreet Howard, MD Mona Norman, MD Adonis Albright, MD Loli Shrestha, PARicardaC    Lee Yin, MD Pediatrics   Catalina Durant, MD Saida Banks, MD Candelaria Denton, APRN CNP Mouna Avalos APRN CNP   MD Marge Flower, MD Ayse Harris, CNP     Mark Fried, CNP Same-Day Provider (No follow-up visits)   GEOVANNI Cheney, DNP Margaret Madera, GEOVANNI Douglas, FNP, BC EDGARDO GaliciaC     Clinic hours: Monday - Thursday 7 am-6 pm; Fridays 7 am-5 pm.   Urgent care: Monday - Friday 10 am- 8 pm; Saturday and Sunday 9 am-5 pm.    Clinic: [phone]       [facility]: Monday - Thursday 8 am - 7 pm; Friday 8 am - 6 pm  [facility]: [phone]

## 2025-03-19 NOTE — PROGRESS NOTES
"  Assessment & Plan     Type 2 diabetes mellitus with hyperglycemia, without long-term current use of insulin (H)  Good control but would like to treat to normal - increase dose, check labs and follow up in 6 months. Discussed protein focused diet  - Lipid panel reflex to direct LDL Non-fasting; Future  - Albumin Random Urine Quantitative with Creat Ratio; Future  - HEMOGLOBIN A1C; Future  - blood glucose monitoring (NO BRAND SPECIFIED) meter device kit; Use to test blood sugar once times daily or as directed. Preferred blood glucose meter OR supplies to accompany: Blood Glucose Monitor Brands: per insurance.  - blood glucose (NO BRAND SPECIFIED) test strip; Use to test blood sugar one times daily or as directed. To accompany: Blood Glucose Monitor Brands: per insurance.  - thin (NO BRAND SPECIFIED) lancets; Use with lanceting device. To accompany: Blood Glucose Monitor Brands: per insurance.  - Semaglutide, 2 MG/DOSE, (OZEMPIC) 8 MG/3ML pen; Inject 2 mg subcutaneously every 7 days.  - Lipid panel reflex to direct LDL Non-fasting  - Albumin Random Urine Quantitative with Creat Ratio  - HEMOGLOBIN A1C    Hypertension goal BP (blood pressure) < 140/90  Controlled - continue current treatment  - BASIC METABOLIC PANEL; Future  - hydroCHLOROthiazide 12.5 MG tablet; Take 1 tablet (12.5 mg) by mouth every morning.  - BASIC METABOLIC PANEL    Hyperlipidemia LDL goal <100  Continue statin  - pravastatin (PRAVACHOL) 80 MG tablet; Take 1 tablet (80 mg) by mouth every evening.    Morbid obesity (H)  Ozempic    Prostate cancer (H)  Continue per urology    Shellfish allergy  Refill for prn use.  - EPINEPHrine (AUVI-Q) 0.3 MG/0.3ML injection 2-pack; Inject 0.3 mLs (0.3 mg) into the muscle as needed for anaphylaxis.          BMI  Estimated body mass index is 38.87 kg/m  as calculated from the following:    Height as of this encounter: 1.854 m (6' 1\").    Weight as of this encounter: 133.6 kg (294 lb 9.6 oz).   Weight management " plan: Discussed healthy diet and exercise guidelines      The uses and side effects, including black box warnings as appropriate, were discussed in detail.  All patient questions were answered.  The patient was instructed to call immediately if any side effects developed.         Khris Armenta is a 59 year old, presenting for the following health issues:  Diabetes, Hypertension, and Lipids      3/19/2025    11:28 AM   Additional Questions   Roomed by alana   Accompanied by self         3/19/2025    11:28 AM   Patient Reported Additional Medications   Patient reports taking the following new medications none     History of Present Illness       Diabetes:   He presents for follow up of diabetes.  He is checking home blood glucose a few times a month.   He checks blood glucose before meals.  Blood glucose is never over 200 and never under 70. He is aware of hypoglycemia symptoms including shakiness.    He has no concerns regarding his diabetes at this time.   He is not experiencing numbness or burning in feet, excessive thirst, blurry vision, weight changes or redness, sores or blisters on feet.           Hyperlipidemia:  He presents for follow up of hyperlipidemia.   He is taking medication to lower cholesterol. He is not having myalgia or other side effects to statin medications.    Hypertension: He presents for follow up of hypertension.  He does check blood pressure  regularly outside of the clinic. Outside blood pressures have been over 140/90. He follows a low salt diet.     He eats 0-1 servings of fruits and vegetables daily.He consumes 2 sweetened beverage(s) daily.He exercises with enough effort to increase his heart rate 10 to 19 minutes per day.  He exercises with enough effort to increase his heart rate 3 or less days per week.   He is taking medications regularly.                  Review of Systems  Constitutional, neuro, ENT, endocrine, pulmonary, cardiac, gastrointestinal, genitourinary,  "musculoskeletal, integument and psychiatric systems are negative, except as otherwise noted.      Objective    /80 (BP Location: Left arm, Patient Position: Sitting, Cuff Size: Adult Large)   Pulse 71   Temp 97  F (36.1  C) (Temporal)   Resp 18   Ht 1.854 m (6' 1\")   Wt 133.6 kg (294 lb 9.6 oz)   SpO2 99%   BMI 38.87 kg/m    Body mass index is 38.87 kg/m .  Physical Exam   GENERAL: alert, no distress, and obese  NECK: no adenopathy, no asymmetry, masses, or scars  RESP: lungs clear to auscultation - no rales, rhonchi or wheezes  CV: regular rate and rhythm, normal S1 S2, no S3 or S4, no murmur, click or rub, no peripheral edema  ABDOMEN: soft, nontender, no hepatosplenomegaly, no masses and bowel sounds normal  MS: no gross musculoskeletal defects noted, no edema  PSYCH: mentation appears normal, affect normal/bright            Signed Electronically by: Mona Scales MD    "

## 2025-03-20 LAB
ANION GAP SERPL CALCULATED.3IONS-SCNC: 10 MMOL/L (ref 7–15)
BUN SERPL-MCNC: 11.6 MG/DL (ref 8–23)
CALCIUM SERPL-MCNC: 9.9 MG/DL (ref 8.8–10.4)
CHLORIDE SERPL-SCNC: 99 MMOL/L (ref 98–107)
CHOLEST SERPL-MCNC: 178 MG/DL
CREAT SERPL-MCNC: 0.96 MG/DL (ref 0.67–1.17)
CREAT UR-MCNC: 247 MG/DL
EGFRCR SERPLBLD CKD-EPI 2021: 90 ML/MIN/1.73M2
FASTING STATUS PATIENT QL REPORTED: YES
FASTING STATUS PATIENT QL REPORTED: YES
GLUCOSE SERPL-MCNC: 110 MG/DL (ref 70–99)
HCO3 SERPL-SCNC: 27 MMOL/L (ref 22–29)
HDLC SERPL-MCNC: 48 MG/DL
LDLC SERPL CALC-MCNC: 113 MG/DL
MICROALBUMIN UR-MCNC: <12 MG/L
MICROALBUMIN/CREAT UR: NORMAL MG/G{CREAT}
NONHDLC SERPL-MCNC: 130 MG/DL
POTASSIUM SERPL-SCNC: 4.5 MMOL/L (ref 3.4–5.3)
SODIUM SERPL-SCNC: 136 MMOL/L (ref 135–145)
TRIGL SERPL-MCNC: 85 MG/DL

## 2025-05-22 ENCOUNTER — VIRTUAL VISIT (OUTPATIENT)
Dept: UROLOGY | Facility: CLINIC | Age: 60
End: 2025-05-22
Payer: COMMERCIAL

## 2025-05-22 DIAGNOSIS — C61 PROSTATE CANCER (H): Primary | ICD-10-CM

## 2025-05-22 NOTE — PATIENT INSTRUCTIONS
[employer] is for any [facility] looking for free support to quit smoking, vaping or chewing.   [employer] will offer many quit support options and resources so [place] residents can continue to find the way to quit that works best for them.   Free support includes personalized coaching, email and text support, educational materials, and quit medication (nicotine patches, gum or lozenges) delivered by mail.     Contact [employer] at 1-800-QUIT-NOW or online at [employer] to receive support on your quit journey.

## 2025-05-22 NOTE — PROGRESS NOTES
Geovany is a 59 year old who is being evaluated via a billable video visit.    How would you like to obtain your AVS? MyChart  If the video visit is dropped, the invitation should be resent by: Text to cell phone: [phone]  Will anyone else be joining your video visit? No      Assessment & Plan   Problem List Items Addressed This Visit       Prostate cancer (H) - Primary        Review of the result(s) of each unique test - psa  Ordering of each unique test           No follow-ups on file.      Subjective   Geovany is a 59 year old, presenting for the following health issues:  Video Visit    HPI      f/u for prostate cancer group 1 under active observation.  He is without any complaints. Prostate MRI on 2022 was ok.  Recent psa was stable.  He underwent prostate biopsy in 2023 without any cancer found.  Recent psa was 5.94 stable  He has minimal LUTS.  Prostate size was 68 gm.      Review of Systems  Constitutional, HEENT, cardiovascular, pulmonary, gi and gu systems are negative, except as otherwise noted.      Objective             Physical Exam   GENERAL: alert and no distress  EYES: Eyes grossly normal to inspection.  No discharge or erythema, or obvious scleral/conjunctival abnormalities.  RESP: No audible wheeze, cough, or visible cyanosis.    SKIN: Visible skin clear. No significant rash, abnormal pigmentation or lesions.  NEURO: Cranial nerves grossly intact.  Mentation and speech appropriate for age.  PSYCH: Appropriate affect, tone, and pace of words    Prostate cancer: group 1.  Psa stable.  Repeat prostate MRI and psa in six months.  BPH:  no LUTS.  Cont observation.      Video-Visit Details    Type of service:  Video Visit   Originating Location (pt. Location): Home    Distant Location (provider location):  On-site  Platform used for Video Visit: Felix  Signed Electronically by: Zac Ivy MD

## 2025-06-30 ENCOUNTER — ANCILLARY PROCEDURE (OUTPATIENT)
Dept: GENERAL RADIOLOGY | Facility: CLINIC | Age: 60
End: 2025-06-30
Payer: COMMERCIAL

## 2025-06-30 ENCOUNTER — OFFICE VISIT (OUTPATIENT)
Dept: URGENT CARE | Facility: URGENT CARE | Age: 60
End: 2025-06-30
Payer: COMMERCIAL

## 2025-06-30 VITALS
BODY MASS INDEX: 39.76 KG/M2 | RESPIRATION RATE: 18 BRPM | SYSTOLIC BLOOD PRESSURE: 172 MMHG | HEART RATE: 85 BPM | OXYGEN SATURATION: 100 % | TEMPERATURE: 98.4 F | DIASTOLIC BLOOD PRESSURE: 96 MMHG | WEIGHT: 300 LBS | HEIGHT: 73 IN

## 2025-06-30 DIAGNOSIS — J20.9 ACUTE BRONCHITIS, UNSPECIFIED ORGANISM: Primary | ICD-10-CM

## 2025-06-30 DIAGNOSIS — R05.1 ACUTE COUGH: ICD-10-CM

## 2025-06-30 PROCEDURE — 99213 OFFICE O/P EST LOW 20 MIN: CPT

## 2025-06-30 PROCEDURE — 71046 X-RAY EXAM CHEST 2 VIEWS: CPT | Mod: TC | Performed by: RADIOLOGY

## 2025-06-30 PROCEDURE — 3080F DIAST BP >= 90 MM HG: CPT

## 2025-06-30 PROCEDURE — 3077F SYST BP >= 140 MM HG: CPT

## 2025-06-30 RX ORDER — PREDNISONE 20 MG/1
20 TABLET ORAL DAILY
Qty: 5 TABLET | Refills: 0 | Status: SHIPPED | OUTPATIENT
Start: 2025-06-30 | End: 2025-07-05

## 2025-06-30 RX ORDER — BENZONATATE 200 MG/1
200 CAPSULE ORAL 3 TIMES DAILY PRN
Qty: 21 CAPSULE | Refills: 0 | Status: SHIPPED | OUTPATIENT
Start: 2025-06-30

## 2025-06-30 NOTE — PROGRESS NOTES
Urgent Care Clinic Visit    Chief Complaint   Patient presents with    Urgent Care    URI     Patient reports symptoms started two weeks ago having chest congestion, coughing, and post nasal drip. Patient has been taking dayquil which states helps a little but still having symptoms            6/30/2025     5:29 PM   Additional Questions   Roomed by YONATHAN Lyon   Accompanied by Self

## 2025-06-30 NOTE — PROGRESS NOTES
"ASSESSMENT:  (J20.9) Acute bronchitis, unspecified organism  (primary encounter diagnosis)  Plan: predniSONE (DELTASONE) 20 MG tablet,         benzonatate (TESSALON) 200 MG capsule    (R05.1) Acute cough  Plan: XR Chest 2 Views    PLAN:  Informed the patient that the chest x-ray does not show any pneumonia per the radiologist report.  Bronchitis patient instructions discussed and provided.  We discussed taking the medications as prescribed and following up with his primary care provider should symptoms persist.  We also discussed returning to clinic with any new or worsening symptoms.  Patient acknowledged his understanding of the above plan.    The use of Dragon/PowerMic dictation services may have been used to construct the content in this note; any grammatical or spelling errors are non-intentional. Please contact the author of this note directly if you are in need of any clarification.      Dhaval Doyle, GEOVANNI CNP      SUBJECTIVE:  Geovany Marte is a 59 year old male who presents to the clinic today with a chief complaint of cough  for 2 week(s).  His cough is described as slightly productive.    The patient's symptoms are not changing over the course of time.  Associated symptoms include rhinorrhea. The patient's symptoms are exacerbated by no particular triggers  Patient has been using OTC cough suppressants  to improve symptoms.    ROS  Negative except noted above.     OBJECTIVE:  BP (!) 184/109   Pulse 85   Temp 98.4  F (36.9  C) (Tympanic)   Resp 18   Ht 1.854 m (6' 1\")   Wt 136.1 kg (300 lb)   SpO2 100%   BMI 39.58 kg/m    GENERAL APPEARANCE: healthy, alert and no distress  EYES: EOMI,  PERRL, conjunctiva clear  HENT: nose and mouth without erythema, ulcers or lesions and oral mucous membranes moist, no erythema noted  NECK: supple, nontender, no lymphadenopathy  RESP: lungs clear to auscultation - no rales, rhonchi or wheezes  CV: regular rates and rhythm, normal S1 S2, no murmur " noted  SKIN: no suspicious lesions or rashes    X-RAY: Chest x-ray does not show any pneumonia per the radiologist report.

## 2025-06-30 NOTE — PATIENT INSTRUCTIONS
Chest x-ray does not show any pneumonia per the radiologist report.  Take the medications as prescribed.  Follow-up with your primary care provider should symptoms persist.

## 2025-07-10 ENCOUNTER — OFFICE VISIT (OUTPATIENT)
Dept: URGENT CARE | Facility: URGENT CARE | Age: 60
End: 2025-07-10
Payer: COMMERCIAL

## 2025-07-10 VITALS
HEART RATE: 89 BPM | TEMPERATURE: 98.2 F | BODY MASS INDEX: 39.89 KG/M2 | RESPIRATION RATE: 18 BRPM | SYSTOLIC BLOOD PRESSURE: 138 MMHG | WEIGHT: 301 LBS | DIASTOLIC BLOOD PRESSURE: 89 MMHG | OXYGEN SATURATION: 97 % | HEIGHT: 73 IN

## 2025-07-10 DIAGNOSIS — J45.901 EXACERBATION OF ASTHMA, UNSPECIFIED ASTHMA SEVERITY, UNSPECIFIED WHETHER PERSISTENT: Primary | ICD-10-CM

## 2025-07-10 RX ORDER — PREDNISONE 20 MG/1
40 TABLET ORAL DAILY
Qty: 10 TABLET | Refills: 0 | Status: SHIPPED | OUTPATIENT
Start: 2025-07-10 | End: 2025-07-15

## 2025-07-10 ASSESSMENT — PAIN SCALES - GENERAL: PAINLEVEL_OUTOF10: NO PAIN (0)

## 2025-07-10 NOTE — PROGRESS NOTES
"ASSESSMENT:  (J45.906) Exacerbation of asthma, unspecified asthma severity, unspecified whether persistent  (primary encounter diagnosis)  Plan: predniSONE (DELTASONE) 20 MG tablet    PLAN:  Informed the patient to take prednisone as prescribed as he felt some improvement after taking the benzonatate and 20 mg daily of prednisone for 5 days.  Suspect continued inflammation therefore 40 mg of prednisone prescribed for 5 days and the patient was instructed to follow-up with his primary care provider should symptoms persist.  Patient acknowledged his understanding of the above plan.    The use of Dragon/PowerMic dictation services may have been used to construct the content in this note; any grammatical or spelling errors are non-intentional. Please contact the author of this note directly if you are in need of any clarification.      Dhaval Doyle, GEOVANNI CNP      SUBJECTIVE:  Geovany Marte is a 59 year old male who presents to the clinic today with a chief complaint of cough  for 3 week(s).  His cough is described as slightly productive.    The patient's symptoms are improving.  Associated symptoms include none. The patient's symptoms are exacerbated by no particular triggers  Patient has been using prednisone, benzonatate and Mucinex to improve symptoms.    ROS  Negative except noted above.     OBJECTIVE:  /89 (BP Location: Left arm, Patient Position: Sitting, Cuff Size: Adult Large)   Pulse 89   Temp 98.2  F (36.8  C) (Oral)   Resp 18   Ht 1.854 m (6' 1\")   Wt (!) 136.5 kg (301 lb)   SpO2 97%   BMI 39.71 kg/m    GENERAL APPEARANCE: healthy, alert and no distress  EYES: EOMI,  PERRL, conjunctiva clear  HENT: ear canals and TM's normal.  Nose and mouth without ulcers, erythema or lesions  NECK: supple, nontender, no lymphadenopathy  RESP: expiratory wheezes bilateral  CV: regular rates and rhythm, normal S1 S2, no murmur noted  SKIN: no suspicious lesions or rashes  "

## 2025-07-10 NOTE — PROGRESS NOTES
Urgent Care Clinic Visit    Chief Complaint   Patient presents with    Cough     Coughing for 3 weeks (was seen and no resolution with medication given)               7/10/2025     2:30 PM   Additional Questions   Roomed by Brittney   Accompanied by Self

## 2025-07-10 NOTE — PATIENT INSTRUCTIONS
Take the prednisone as prescribed.  Follow up with your primary care provider should symptoms persist.

## 2025-07-22 ENCOUNTER — TELEPHONE (OUTPATIENT)
Dept: FAMILY MEDICINE | Facility: CLINIC | Age: 60
End: 2025-07-22
Payer: COMMERCIAL

## 2025-07-22 NOTE — TELEPHONE ENCOUNTER
Reason for Call:  Appointment Request    Patient requesting this type of appt:  Hospital/ED Follow-Up     Requested provider: Mona Lin    Reason patient unable to be scheduled: Not within requested timeframe    When does patient want to be seen/preferred time: 3-7 days    Comments: Patient has an appointment for 08/04/25 for a urgent care follow up and would like to be seen before that if possible    Could we send this information to you in [employer] or would you prefer to receive a phone call?:   Patient would like to be contacted via [employer]    Call taken on 7/22/2025 at 8:52 AM by Jaleesa Davidson

## 2025-07-22 NOTE — TELEPHONE ENCOUNTER
Called and spoke to the patient and scheduled the follow up appt for 7/30/2025, ok per [facility].  Gabriela Anthony MA/  [facility]   Primary Care

## 2025-07-30 ENCOUNTER — OFFICE VISIT (OUTPATIENT)
Dept: FAMILY MEDICINE | Facility: CLINIC | Age: 60
End: 2025-07-30
Payer: COMMERCIAL

## 2025-07-30 VITALS
DIASTOLIC BLOOD PRESSURE: 81 MMHG | OXYGEN SATURATION: 97 % | WEIGHT: 297 LBS | SYSTOLIC BLOOD PRESSURE: 130 MMHG | HEART RATE: 97 BPM | HEIGHT: 73 IN | RESPIRATION RATE: 16 BRPM | TEMPERATURE: 98.2 F | BODY MASS INDEX: 39.36 KG/M2

## 2025-07-30 DIAGNOSIS — J45.20 INTERMITTENT ASTHMA, UNCOMPLICATED: ICD-10-CM

## 2025-07-30 DIAGNOSIS — J20.9 ACUTE BRONCHITIS, UNSPECIFIED ORGANISM: Primary | ICD-10-CM

## 2025-07-30 PROCEDURE — G2211 COMPLEX E/M VISIT ADD ON: HCPCS | Performed by: FAMILY MEDICINE

## 2025-07-30 PROCEDURE — 99213 OFFICE O/P EST LOW 20 MIN: CPT | Performed by: FAMILY MEDICINE

## 2025-07-30 PROCEDURE — 3075F SYST BP GE 130 - 139MM HG: CPT | Performed by: FAMILY MEDICINE

## 2025-07-30 PROCEDURE — 3079F DIAST BP 80-89 MM HG: CPT | Performed by: FAMILY MEDICINE

## 2025-07-30 RX ORDER — ALBUTEROL SULFATE 90 UG/1
2 INHALANT RESPIRATORY (INHALATION) EVERY 4 HOURS PRN
Qty: 18 G | Refills: 5 | Status: SHIPPED | OUTPATIENT
Start: 2025-07-30

## 2025-07-30 NOTE — PATIENT INSTRUCTIONS
At [facility], we strive to deliver an exceptional experience to you, every time we see you. If you receive a survey, please let us know what we are doing well and/or what we could improve upon, as we do value your feedback.  If you have MyChart, you can expect to receive results automatically within 24 hours of their completion.  Your provider will send a note interpreting your results as well.   If you do not have MyChart, you should receive your results in about a week by mail.    Your care team:                            Family Medicine Internal Medicine   MD Gurpreet Howard, MD Mona Norman, MD Adonis Albright, MD Loli Shrestha, PA-C GEOVANNI Cheney, JEZ Yin, MD Pediatrics   MD Saida Willson, MD Amrita Montez, PAVICENTE Avalos APRN CNP   MD Marge Flower, MD Ayse Harris, CNP      Same-Day Provider (No follow-up visits)    GABBIE Dinero PA-C     Clinic hours: Monday - Thursday 7 am-6 pm; Fridays 7 am-5 pm.   Urgent care: Monday - Friday 10 am- 8 pm; Saturday and Sunday 9 am-5 pm.    Clinic: [phone]       [facility]: Monday - Thursday 8 am - 7 pm; Friday 8 am - 6 pm  [facility]: [phone]     [facility] Scheduling: Monday - Friday 7 am - 7 pm; Saturday 7 am - 3:30 pm  ([phone] [place] : [phone]

## 2025-07-30 NOTE — PROGRESS NOTES
"  Assessment & Plan     Acute bronchitis, unspecified organism  Resolved - discussed using albuterol prn for respiratory symptoms and caution with current air qualiy    Intermittent asthma, uncomplicated  As above  - albuterol (PROAIR HFA/PROVENTIL HFA/VENTOLIN HFA) 108 (90 Base) MCG/ACT inhaler; Inhale 2 puffs into the lungs every 4 hours as needed (for asthma symptoms).    The uses and side effects, including black box warnings as appropriate, were discussed in detail.  All patient questions were answered.  The patient was instructed to call immediately if any side effects developed.     MED REC REQUIRED  Post Medication Reconciliation Status:  Discharge medications reconciled, continue medications without change  Follow-up   Has physical 9/24/25    Khris Armenta is a 59 year old, presenting for the following health issues:  Hospital F/U        7/30/2025     1:02 PM   Additional Questions   Roomed by lyubov   Accompanied by self         7/30/2025     1:02 PM   Patient Reported Additional Medications   Patient reports taking the following new medications no     HPI        ED/UC Followup:    Facility:  [facility]    Date of visit: 07/10/2025  Reason for visit: coughing   Current Status: was given two rounds of prednisone and it seems like med did help. No longer coughing. Wants to sure it is clear.      Was seen x 2 in UC, initially given prednisone and not improved after a week so seen again and given double the dose of prednisone and feeling better. Had cxr which was normal.        Review of Systems  Constitutional, HEENT, cardiovascular, pulmonary, gi and gu systems are negative, except as otherwise noted.      Objective    /81 (BP Location: Right arm, Patient Position: Sitting, Cuff Size: Adult Large)   Pulse 97   Temp 98.2  F (36.8  C) (Oral)   Resp 16   Ht 1.854 m (6' 1\")   Wt 134.7 kg (297 lb)   SpO2 97%   BMI 39.18 kg/m    Body mass index is 39.18 kg/m .  Physical Exam "   GENERAL: alert, no distress, and obese  NECK: no adenopathy, no asymmetry, masses, or scars  RESP: lungs clear to auscultation - no rales, rhonchi or wheezes  CV: regular rate and rhythm, normal S1 S2, no S3 or S4, no murmur, click or rub, no peripheral edema  PSYCH: mentation appears normal, affect normal/bright    Reviewed July CXR        Signed Electronically by: Mona Scales MD

## 2025-08-10 DIAGNOSIS — E11.65 TYPE 2 DIABETES MELLITUS WITH HYPERGLYCEMIA, WITHOUT LONG-TERM CURRENT USE OF INSULIN (H): ICD-10-CM

## 2025-08-11 RX ORDER — SEMAGLUTIDE 2.68 MG/ML
INJECTION, SOLUTION SUBCUTANEOUS
Qty: 9 ML | Refills: 3 | OUTPATIENT
Start: 2025-08-11

## 2025-08-18 DIAGNOSIS — I10 HYPERTENSION GOAL BP (BLOOD PRESSURE) < 140/90: ICD-10-CM

## 2025-08-19 RX ORDER — AMLODIPINE BESYLATE 10 MG/1
10 TABLET ORAL DAILY
Qty: 90 TABLET | Refills: 1 | Status: SHIPPED | OUTPATIENT
Start: 2025-08-19

## 2025-08-23 DIAGNOSIS — E11.65 TYPE 2 DIABETES MELLITUS WITH HYPERGLYCEMIA, WITHOUT LONG-TERM CURRENT USE OF INSULIN (H): ICD-10-CM

## 2025-08-25 RX ORDER — SEMAGLUTIDE 2.68 MG/ML
INJECTION, SOLUTION SUBCUTANEOUS
Qty: 9 ML | Refills: 0 | Status: SHIPPED | OUTPATIENT
Start: 2025-08-25

## 2025-08-27 ENCOUNTER — TELEPHONE (OUTPATIENT)
Dept: FAMILY MEDICINE | Facility: CLINIC | Age: 60
End: 2025-08-27
Payer: COMMERCIAL

## 2025-09-04 ENCOUNTER — OFFICE VISIT (OUTPATIENT)
Dept: OPTOMETRY | Facility: CLINIC | Age: 60
End: 2025-09-04
Payer: COMMERCIAL

## 2025-09-04 DIAGNOSIS — H40.003 GLAUCOMA SUSPECT OF BOTH EYES: ICD-10-CM

## 2025-09-04 DIAGNOSIS — H52.03 HYPEROPIA, BILATERAL: ICD-10-CM

## 2025-09-04 DIAGNOSIS — H10.13 ALLERGIC CONJUNCTIVITIS OF BOTH EYES: ICD-10-CM

## 2025-09-04 DIAGNOSIS — E11.9 TYPE 2 DIABETES MELLITUS WITHOUT COMPLICATION, WITHOUT LONG-TERM CURRENT USE OF INSULIN (H): Primary | ICD-10-CM

## 2025-09-04 DIAGNOSIS — H25.13 NUCLEAR SCLEROSIS OF BOTH EYES: ICD-10-CM

## 2025-09-04 DIAGNOSIS — H52.4 PRESBYOPIA: ICD-10-CM

## 2025-09-04 RX ORDER — AZELASTINE HYDROCHLORIDE 0.5 MG/ML
1 SOLUTION/ DROPS OPHTHALMIC 2 TIMES DAILY
Qty: 6 ML | Refills: 11 | Status: SHIPPED | OUTPATIENT
Start: 2025-09-04

## 2025-09-04 ASSESSMENT — VISUAL ACUITY
OS_CC: 20/20
OD_SC: 20/40
OS_SC: 20/40
CORRECTION_TYPE: GLASSES
METHOD: SNELLEN - LINEAR
OD_SC+: -1
OS_SC: 20/100
OD_SC: 20/100
OD_CC: 20/20
OS_CC+: -2

## 2025-09-04 ASSESSMENT — PACHYMETRY
OD_CT(UM): 530
OS_CT(UM): 520

## 2025-09-04 ASSESSMENT — EXTERNAL EXAM - RIGHT EYE: OD_EXAM: NORMAL

## 2025-09-04 ASSESSMENT — KERATOMETRY
OD_AXISANGLE_DEGREES: 114
OD_K1POWER_DIOPTERS: 41.75
OS_K1POWER_DIOPTERS: 42.00
OS_K2POWER_DIOPTERS: 42.50
OD_AXISANGLE2_DEGREES: 024
OS_AXISANGLE2_DEGREES: 144
OD_K2POWER_DIOPTERS: 42.00
OS_AXISANGLE_DEGREES: 054

## 2025-09-04 ASSESSMENT — REFRACTION_WEARINGRX
OS_SPHERE: +2.25
OD_SPHERE: +2.25

## 2025-09-04 ASSESSMENT — CONF VISUAL FIELD
OS_INFERIOR_TEMPORAL_RESTRICTION: 0
OD_NORMAL: 1
OD_INFERIOR_NASAL_RESTRICTION: 0
OS_INFERIOR_NASAL_RESTRICTION: 0
OD_SUPERIOR_TEMPORAL_RESTRICTION: 0
OD_INFERIOR_TEMPORAL_RESTRICTION: 0
OS_NORMAL: 1
OS_SUPERIOR_TEMPORAL_RESTRICTION: 0
OD_SUPERIOR_NASAL_RESTRICTION: 0
OS_SUPERIOR_NASAL_RESTRICTION: 0

## 2025-09-04 ASSESSMENT — REFRACTION_MANIFEST
OS_AXIS: 045
OD_ADD: +2.25
OS_CYLINDER: +0.50
OS_SPHERE: +1.75
OD_SPHERE: +2.00
OD_CYLINDER: SPHERE
OS_ADD: +2.25

## 2025-09-04 ASSESSMENT — EXTERNAL EXAM - LEFT EYE: OS_EXAM: NORMAL

## 2025-09-04 ASSESSMENT — SLIT LAMP EXAM - LIDS
COMMENTS: NORMAL
COMMENTS: NORMAL

## 2025-09-04 ASSESSMENT — TONOMETRY
OD_IOP_MMHG: 17.4
OS_IOP_MMHG: 17.5
IOP_METHOD: ICARE

## (undated) RX ORDER — SIMETHICONE 40MG/0.6ML
SUSPENSION, DROPS(FINAL DOSAGE FORM)(ML) ORAL
Status: DISPENSED
Start: 2020-09-29